# Patient Record
Sex: FEMALE | Race: BLACK OR AFRICAN AMERICAN | NOT HISPANIC OR LATINO | Employment: FULL TIME | URBAN - METROPOLITAN AREA
[De-identification: names, ages, dates, MRNs, and addresses within clinical notes are randomized per-mention and may not be internally consistent; named-entity substitution may affect disease eponyms.]

---

## 2017-02-16 ENCOUNTER — ALLSCRIPTS OFFICE VISIT (OUTPATIENT)
Dept: OTHER | Facility: OTHER | Age: 42
End: 2017-02-16

## 2017-02-16 DIAGNOSIS — M54.50 LOW BACK PAIN: ICD-10-CM

## 2017-03-09 ENCOUNTER — APPOINTMENT (OUTPATIENT)
Dept: LAB | Facility: CLINIC | Age: 42
End: 2017-03-09
Payer: COMMERCIAL

## 2017-03-09 DIAGNOSIS — M54.50 LOW BACK PAIN: ICD-10-CM

## 2017-03-09 LAB
ALBUMIN SERPL BCP-MCNC: 3.5 G/DL (ref 3.5–5)
ALP SERPL-CCNC: 56 U/L (ref 46–116)
ALT SERPL W P-5'-P-CCNC: 24 U/L (ref 12–78)
ANION GAP SERPL CALCULATED.3IONS-SCNC: 8 MMOL/L (ref 4–13)
AST SERPL W P-5'-P-CCNC: 12 U/L (ref 5–45)
BASOPHILS # BLD AUTO: 0.01 THOUSANDS/ΜL (ref 0–0.1)
BASOPHILS NFR BLD AUTO: 0 % (ref 0–1)
BILIRUB SERPL-MCNC: 0.52 MG/DL (ref 0.2–1)
BILIRUB UR QL STRIP: NEGATIVE
BUN SERPL-MCNC: 8 MG/DL (ref 5–25)
CALCIUM SERPL-MCNC: 8.5 MG/DL (ref 8.3–10.1)
CHLORIDE SERPL-SCNC: 106 MMOL/L (ref 100–108)
CLARITY UR: CLEAR
CO2 SERPL-SCNC: 28 MMOL/L (ref 21–32)
COLOR UR: YELLOW
CREAT SERPL-MCNC: 0.73 MG/DL (ref 0.6–1.3)
EOSINOPHIL # BLD AUTO: 0.06 THOUSAND/ΜL (ref 0–0.61)
EOSINOPHIL NFR BLD AUTO: 2 % (ref 0–6)
ERYTHROCYTE [DISTWIDTH] IN BLOOD BY AUTOMATED COUNT: 13.3 % (ref 11.6–15.1)
GFR SERPL CREATININE-BSD FRML MDRD: >60 ML/MIN/1.73SQ M
GLUCOSE SERPL-MCNC: 85 MG/DL (ref 65–140)
GLUCOSE UR STRIP-MCNC: NEGATIVE MG/DL
HCT VFR BLD AUTO: 42.4 % (ref 34.8–46.1)
HGB BLD-MCNC: 14.1 G/DL (ref 11.5–15.4)
HGB UR QL STRIP.AUTO: NEGATIVE
KETONES UR STRIP-MCNC: NEGATIVE MG/DL
LEUKOCYTE ESTERASE UR QL STRIP: NEGATIVE
LYMPHOCYTES # BLD AUTO: 2.47 THOUSANDS/ΜL (ref 0.6–4.47)
LYMPHOCYTES NFR BLD AUTO: 61 % (ref 14–44)
MAGNESIUM SERPL-MCNC: 2.1 MG/DL (ref 1.6–2.6)
MCH RBC QN AUTO: 27.8 PG (ref 26.8–34.3)
MCHC RBC AUTO-ENTMCNC: 33.3 G/DL (ref 31.4–37.4)
MCV RBC AUTO: 84 FL (ref 82–98)
MONOCYTES # BLD AUTO: 0.29 THOUSAND/ΜL (ref 0.17–1.22)
MONOCYTES NFR BLD AUTO: 7 % (ref 4–12)
NEUTROPHILS # BLD AUTO: 1.21 THOUSANDS/ΜL (ref 1.85–7.62)
NEUTS SEG NFR BLD AUTO: 30 % (ref 43–75)
NITRITE UR QL STRIP: NEGATIVE
NRBC BLD AUTO-RTO: 0 /100 WBCS
PH UR STRIP.AUTO: 6.5 [PH] (ref 4.5–8)
PLATELET # BLD AUTO: 196 THOUSANDS/UL (ref 149–390)
PMV BLD AUTO: 11.3 FL (ref 8.9–12.7)
POTASSIUM SERPL-SCNC: 3.3 MMOL/L (ref 3.5–5.3)
PROT SERPL-MCNC: 7.3 G/DL (ref 6.4–8.2)
PROT UR STRIP-MCNC: NEGATIVE MG/DL
RBC # BLD AUTO: 5.07 MILLION/UL (ref 3.81–5.12)
SODIUM SERPL-SCNC: 142 MMOL/L (ref 136–145)
SP GR UR STRIP.AUTO: 1.02 (ref 1–1.03)
UROBILINOGEN UR QL STRIP.AUTO: 0.2 E.U./DL
WBC # BLD AUTO: 4.05 THOUSAND/UL (ref 4.31–10.16)

## 2017-03-09 PROCEDURE — 80053 COMPREHEN METABOLIC PANEL: CPT

## 2017-03-09 PROCEDURE — 87491 CHLMYD TRACH DNA AMP PROBE: CPT

## 2017-03-09 PROCEDURE — 36415 COLL VENOUS BLD VENIPUNCTURE: CPT

## 2017-03-09 PROCEDURE — 81003 URINALYSIS AUTO W/O SCOPE: CPT

## 2017-03-09 PROCEDURE — 83735 ASSAY OF MAGNESIUM: CPT

## 2017-03-09 PROCEDURE — 85025 COMPLETE CBC W/AUTO DIFF WBC: CPT

## 2017-03-09 PROCEDURE — 87591 N.GONORRHOEAE DNA AMP PROB: CPT

## 2017-03-10 ENCOUNTER — GENERIC CONVERSION - ENCOUNTER (OUTPATIENT)
Dept: OTHER | Facility: OTHER | Age: 42
End: 2017-03-10

## 2017-03-10 LAB
CHLAMYDIA DNA CVX QL NAA+PROBE: NORMAL
N GONORRHOEA DNA GENITAL QL NAA+PROBE: NORMAL

## 2017-03-13 ENCOUNTER — GENERIC CONVERSION - ENCOUNTER (OUTPATIENT)
Dept: OTHER | Facility: OTHER | Age: 42
End: 2017-03-13

## 2017-08-18 ENCOUNTER — ALLSCRIPTS OFFICE VISIT (OUTPATIENT)
Dept: OTHER | Facility: OTHER | Age: 42
End: 2017-08-18

## 2017-08-18 DIAGNOSIS — R25.2 CRAMP AND SPASM: ICD-10-CM

## 2017-08-18 DIAGNOSIS — E87.6 HYPOKALEMIA: ICD-10-CM

## 2017-08-18 DIAGNOSIS — R10.9 ABDOMINAL PAIN: ICD-10-CM

## 2017-08-18 DIAGNOSIS — K21.9 GASTRO-ESOPHAGEAL REFLUX DISEASE WITHOUT ESOPHAGITIS: ICD-10-CM

## 2017-08-18 DIAGNOSIS — D72.819 DECREASED WHITE BLOOD CELL COUNT: ICD-10-CM

## 2017-08-23 ENCOUNTER — APPOINTMENT (OUTPATIENT)
Dept: LAB | Facility: CLINIC | Age: 42
End: 2017-08-23
Payer: COMMERCIAL

## 2017-08-23 DIAGNOSIS — E87.6 HYPOKALEMIA: ICD-10-CM

## 2017-08-23 DIAGNOSIS — R25.2 CRAMP AND SPASM: ICD-10-CM

## 2017-08-23 DIAGNOSIS — R10.9 ABDOMINAL PAIN: ICD-10-CM

## 2017-08-23 DIAGNOSIS — D72.819 DECREASED WHITE BLOOD CELL COUNT: ICD-10-CM

## 2017-08-23 DIAGNOSIS — K21.9 GASTRO-ESOPHAGEAL REFLUX DISEASE WITHOUT ESOPHAGITIS: ICD-10-CM

## 2017-08-23 LAB
ALBUMIN SERPL BCP-MCNC: 3.5 G/DL (ref 3.5–5)
ALP SERPL-CCNC: 54 U/L (ref 46–116)
ALT SERPL W P-5'-P-CCNC: 19 U/L (ref 12–78)
ANION GAP SERPL CALCULATED.3IONS-SCNC: 7 MMOL/L (ref 4–13)
AST SERPL W P-5'-P-CCNC: 13 U/L (ref 5–45)
BASOPHILS # BLD AUTO: 0.01 THOUSANDS/ΜL (ref 0–0.1)
BASOPHILS NFR BLD AUTO: 0 % (ref 0–1)
BILIRUB SERPL-MCNC: 0.81 MG/DL (ref 0.2–1)
BUN SERPL-MCNC: 11 MG/DL (ref 5–25)
CALCIUM SERPL-MCNC: 8.7 MG/DL (ref 8.3–10.1)
CHLORIDE SERPL-SCNC: 105 MMOL/L (ref 100–108)
CO2 SERPL-SCNC: 27 MMOL/L (ref 21–32)
CREAT SERPL-MCNC: 0.72 MG/DL (ref 0.6–1.3)
EOSINOPHIL # BLD AUTO: 0.07 THOUSAND/ΜL (ref 0–0.61)
EOSINOPHIL NFR BLD AUTO: 2 % (ref 0–6)
ERYTHROCYTE [DISTWIDTH] IN BLOOD BY AUTOMATED COUNT: 13.1 % (ref 11.6–15.1)
GFR SERPL CREATININE-BSD FRML MDRD: 120 ML/MIN/1.73SQ M
GLUCOSE P FAST SERPL-MCNC: 75 MG/DL (ref 65–99)
HCT VFR BLD AUTO: 41.8 % (ref 34.8–46.1)
HGB BLD-MCNC: 14.1 G/DL (ref 11.5–15.4)
LYMPHOCYTES # BLD AUTO: 2.06 THOUSANDS/ΜL (ref 0.6–4.47)
LYMPHOCYTES NFR BLD AUTO: 60 % (ref 14–44)
MAGNESIUM SERPL-MCNC: 2.2 MG/DL (ref 1.6–2.6)
MCH RBC QN AUTO: 27.7 PG (ref 26.8–34.3)
MCHC RBC AUTO-ENTMCNC: 33.7 G/DL (ref 31.4–37.4)
MCV RBC AUTO: 82 FL (ref 82–98)
MONOCYTES # BLD AUTO: 0.26 THOUSAND/ΜL (ref 0.17–1.22)
MONOCYTES NFR BLD AUTO: 8 % (ref 4–12)
NEUTROPHILS # BLD AUTO: 1.03 THOUSANDS/ΜL (ref 1.85–7.62)
NEUTS SEG NFR BLD AUTO: 30 % (ref 43–75)
NRBC BLD AUTO-RTO: 0 /100 WBCS
PLATELET # BLD AUTO: 179 THOUSANDS/UL (ref 149–390)
PMV BLD AUTO: 11.3 FL (ref 8.9–12.7)
POTASSIUM SERPL-SCNC: 3.4 MMOL/L (ref 3.5–5.3)
PROT SERPL-MCNC: 7.2 G/DL (ref 6.4–8.2)
RBC # BLD AUTO: 5.09 MILLION/UL (ref 3.81–5.12)
SODIUM SERPL-SCNC: 139 MMOL/L (ref 136–145)
WBC # BLD AUTO: 3.44 THOUSAND/UL (ref 4.31–10.16)

## 2017-08-23 PROCEDURE — 83735 ASSAY OF MAGNESIUM: CPT

## 2017-08-23 PROCEDURE — 80053 COMPREHEN METABOLIC PANEL: CPT

## 2017-08-23 PROCEDURE — 36415 COLL VENOUS BLD VENIPUNCTURE: CPT

## 2017-08-23 PROCEDURE — 84244 ASSAY OF RENIN: CPT

## 2017-08-23 PROCEDURE — 85025 COMPLETE CBC W/AUTO DIFF WBC: CPT

## 2017-08-23 PROCEDURE — 82088 ASSAY OF ALDOSTERONE: CPT

## 2017-08-26 LAB — RENIN PLAS-CCNC: 0.18 NG/ML/HR (ref 0.17–5.38)

## 2017-08-29 LAB — ALDOST SERPL-MCNC: 9.3 NG/DL (ref 0–30)

## 2017-09-28 ENCOUNTER — TRANSCRIBE ORDERS (OUTPATIENT)
Dept: ADMINISTRATIVE | Facility: HOSPITAL | Age: 42
End: 2017-09-28

## 2017-09-28 DIAGNOSIS — E87.6 HYPOPOTASSEMIA: ICD-10-CM

## 2017-09-28 DIAGNOSIS — R10.9 ABDOMINAL PAIN, UNSPECIFIED SITE: Primary | ICD-10-CM

## 2017-10-12 ENCOUNTER — HOSPITAL ENCOUNTER (OUTPATIENT)
Dept: RADIOLOGY | Age: 42
Discharge: HOME | End: 2017-10-12
Payer: COMMERCIAL

## 2017-10-12 DIAGNOSIS — E87.6 HYPOKALEMIA: ICD-10-CM

## 2017-10-12 DIAGNOSIS — R10.9 ABDOMINAL PAIN: ICD-10-CM

## 2017-10-12 PROCEDURE — 74170 CT ABD WO CNTRST FLWD CNTRST: CPT

## 2017-10-12 RX ADMIN — IOHEXOL 100 ML: 350 INJECTION, SOLUTION INTRAVENOUS at 09:12

## 2017-10-17 ENCOUNTER — ALLSCRIPTS OFFICE VISIT (OUTPATIENT)
Dept: OTHER | Facility: OTHER | Age: 42
End: 2017-10-17

## 2017-10-17 DIAGNOSIS — E87.6 HYPOKALEMIA: ICD-10-CM

## 2017-10-17 DIAGNOSIS — Z12.31 ENCOUNTER FOR SCREENING MAMMOGRAM FOR MALIGNANT NEOPLASM OF BREAST: ICD-10-CM

## 2017-10-21 NOTE — PROGRESS NOTES
Assessment  1  Encounter for screening mammogram for breast cancer () (Z12 31)   2  Encounter for routine gynecological examination with Papanicolaou smear of cervix   (,V7 2) (Z01 419)    Plan   * MAMMO SCREENING BILATERAL W CAD; Status:Hold For - Scheduling; Requested [id number];   Perform:[facility]; Due:2018; Ordered;    For:Encounter for screening mammogram for breast cancer; Ordered By:May Morrow; Discussion/Summary  healthy adult female Currently, she eats an adequate diet and has an inadequate exercise regimen  cervical cancer screening is current next cervical cancer screening is due 2021 Breast cancer screening: the risks and benefits of breast cancer screening were discussed, self breast exam technique was taught, monthly self breast exam was advised and mammogram has been ordered  Colorectal cancer screening: colorectal cancer screening is not indicated  Advice and education were given regarding nutrition, aerobic exercise, calcium supplements, vitamin D supplements and seat belt use      41y/o  here for annual exam  Annual exam   - Last pap 2016 resulted NILM and HR HPV negative  Next pap due 2021  - Mammogram ordered today  Hot flashes/ Anxiety   - Reviewed layering clothing, avoiding spicy foods,   - Recommend starting an exercise regimen   - Follow up with PCP if anxiety increasesMaintenance   - Declines Flu vaccine today   - Encourage daily multi-vitamin and calcium supplements1 year or sooner as needed   The patient has the current Goals: Annual exam  The patent has the current Barriers: None  Patient is able to Self-Care  History of Present Illness  HPI: 41 y/o  here for annual exam  Last pap 2016 resulting NILM and HR HPV negative  Next pap due   Last mammogram 10/2015 necessitating a right breast US and subsequent biopsy resulting - consistent with cellular fibroadenoma  LMP 17 occurring monthly and lasting 3 days   Pt c/o hot flashes 2-3 the week prior to menses  Also having episodes 1-2/ week of feeling scared, these episodes increase prior to menses  GYN HM, Adult Female [facility]: The patient is being seen for a gynecology evaluation  The last health maintenance visit was 1 year(s) ago  Social History: Household members include 1 daughter(s)-- and-- 2 son(s)  She is unmarried  Work status: working full time  The patient has never smoked cigarettes  She reports rare alcohol use  She has never used illicit drugs  General Health: The patient's health since the last visit is described as good  She has regular dental visits  -- She denies vision problems  Vision care includes no need for vision correction-- and-- no recent eye examination  -- She denies hearing loss  Immunizations status: up to date  Lifestyle:  She consumes a diverse and healthy diet  -- She does not have any weight concerns  -- She exercises regularly  She exercises 2 times per week  Exercise includes walking -- She does not use tobacco -- She consumes alcohol  She reports occasional alcohol use  -- She denies drug use  Reproductive health: the patient is perimenopausal--   she reports menstrual problems  Menstrual history:  age at menarche was 9  LMP: the last menstrual period was 9/23/17  Recent menstrual periods: bleeding has been normal  The cycles have been regular-- and-- occur approximately every monthly days  The duration of her recent periods has been regular-- and-- usually last 3 days  -- she uses contraception  For contraception, she has had a tubal occlusion  -- she is sexually active  She is monogamous with a male partner  -- pregnancy history: G 6P 4,-- 4(miscarriages: 2 )  Screening: cancer screening reviewed and updated  metabolic screening reviewed and current  risk screening reviewed and current  Review of Systems  no pelvic pain,-- no pelvic pressure,-- no vaginal pain,-- no vaginal discharge-- and-- no dysuria  Constitutional: No fever, no chills, feels well, no tiredness, no recent weight gain or loss  ENT: no ear ache, no loss of hearing, no nosebleeds or nasal discharge, no sore throat or hoarseness  Cardiovascular: no complaints of slow or fast heart rate, no chest pain, no palpitations, no leg claudication or lower extremity edema  Respiratory: no complaints of shortness of breath, no wheezing, no dyspnea on exertion, no orthopnea or PND  Breasts: no complaints of breast pain, breast lump or nipple discharge  Gastrointestinal: no complaints of abdominal pain, no constipation, no nausea or diarrhea, no vomiting, no bloody stools  Genitourinary: no complaints of dysuria, no incontinence, no pelvic pain, no dysmenorrhea, no vaginal discharge or abnormal vaginal bleeding  Musculoskeletal: no complaints of arthralgia, no myalgia, no joint swelling or stiffness, no limb pain or swelling  Integumentary: no complaints of skin rash or lesion, no itching or dry skin, no skin wounds  Neurological: no complaints of headache, no confusion, no numbness or tingling, no dizziness or fainting  Over the past 2 weeks, how often have you been bothered by the following problems? 1 ) Little interest or pleasure in doing things? Not at all    2 ) Feeling down, depressed or hopeless? Not at all    3 ) Trouble falling asleep or sleeping too much? Not at all    4 ) Feeling tired or having little energy? Not at all    5 ) Poor appetite or overeating? Several days  6 ) Feeling bad about yourself, or that you are a failure, or have let yourself or your family down? Not at all    7 ) Trouble concentrating on things, such as reading a newspaper or watching television? Not at all    8 ) Moving or speaking so slowly that other people could have noticed, or the opposite, moving or speaking faster than usual? Not at all     How difficult have these problems made it for you to do your work, take care of things at home, or get along with people? Somewhat difficult  Score 1     ROS reviewed  OB History  Pregnancy History (Brief):   Prior pregnancies: : 6  Para: 4 (full-term)-- and-- 4 (living)   Delivery type: 3 vaginal,-- 1  section   Additional pregnancy history details: 2 miscarriage(s)       Active Problems  1  Abdominal pain (789 00) (R10 9)   2  Abnormal echocardiogram (793 2) (R93 1)   3  GERD without esophagitis (530 81) (K21 9)   4  Hyperlipidemia (272 4) (E78 5)   5  Hypokalemia (276 8) (E87 6)   6  Leg cramps (729 82) (R25 2)   7  Leukopenia (288 50) (D72 819)   8  VSD (ventricular septal defect), perimembranous (745 4) (Q21 0)    Past Medical History   · History of Allergic conjunctivitis (372 14) (H10 10)   · History of Breast cancer screening (V76 10) (Z12 39)   · History of Chest wall contusion (922 1) (S20 219A)   · History of Cyst of right breast (610 0) (N60 01)   · History of Fibroadenoma of right breast (217) (D24 1)   · History of  4   · History of hypertension (V12 59) (Z86 79)   · History of Influenza B (487 1) (J10 1)   · History of On Depo-Provera for contraception (V25 49) (Z30 40)   · History of Oral contraceptive use (V25 41) (Z30 41)   · History of Syphilis (acquired) (097 9) (A53 9)   · History of Type B influenza (487 1) (J10 1)    The active problems and past medical history were reviewed and updated today  Surgical History   · History of Breast Surgery Lumpectomy   · History of  Section   · History of Tubal Ligation    The surgical history was reviewed and updated today  Family History  Father    · Family history of hypertension (V17 49) (Z82 49)    The family history was reviewed and updated today  Social History   · Never A Smoker   · No alcohol use   · No drug use  The social history was reviewed and updated today  Current Meds   1  No Reported Medications Recorded    Allergies  1   No Known Drug Allergies    Vitals   Recorded: 98RJN7218 08: 22AM   Heart Rate 80   Systolic 429, LUE, Sitting   Diastolic 94, LUE, Sitting   Height 5 ft 0 25 in   Weight 137 lb    BMI Calculated 26 53   BSA Calculated 1 59   LMP [id number]   Pain Scale 0     Physical Exam    Constitutional   General appearance: No acute distress, well appearing and well nourished  Neck   Neck: Normal, supple, trachea midline, no masses  Thyroid: Normal, no thyromegaly  Pulmonary   Respiratory effort: No increased work of breathing or signs of respiratory distress  Auscultation of lungs: Clear to auscultation  Cardiovascular   Auscultation of heart: Normal rate and rhythm, normal S1 and S2, no murmurs  Peripheral vascular exam: Normal pulses Throughout  Genitourinary   External genitalia: Normal and no lesions appreciated  Vagina: Normal, no lesions or dryness appreciated  Urethral meatus: Normal     Bladder: Normal, soft, non-tender and no prolapse or masses appreciated  Cervix: Normal, no palpable masses  Examination of the cervix revealed No lesions  A Pap smear was not performed  Bimanual exam findings include no cervical motion tenderness  Uterus: Normal, non-tender, not enlarged, and no palpable masses  Adnexa/parametria: Normal, non-tender and no fullness or masses appreciated  Chest   Breasts: Normal and no dimpling or skin changes noted  Abdomen   Abdomen: Normal, non-tender, and no organomegaly noted  Lymphatic   Palpation of lymph nodes in neck, axillae, groin and/or other locations: No lymphadenopathy or masses noted  Skin   Skin and subcutaneous tissue: Normal skin turgor and no rashes  Psychiatric   Orientation to person, place, and time: Normal     Mood and affect: Normal        Results/Data  PHQ-9 Adult Depression Screening [id number] 08:29AM User, Ahs     Test Name Result Flag Reference   PHQ-9 Adult Depression Score 1     Over the last two weeks, how often have you been bothered by any of the following problems?   Little interest or pleasure in doing things: Not at all - 0  Feeling down, depressed, or hopeless: Not at all - 0  Trouble falling or staying asleep, or sleeping too much: Not at all - 0  Feeling tired or having little energy: Not at all - 0  Poor appetite or over eating: Several days - 1  Feeling bad about yourself - or that you are a failure or have let yourself or your family down: Not at all - 0  Trouble concentrating on things, such as reading the newspaper or watching television: Not at all - 0  Moving or speaking so slowly that other people could have noticed  Or the opposite -  being so fidgety or restless that you have been moving around a lot more than usual: Not at all - 0  Thoughts that you would be better off dead, or of hurting yourself in some way: Not at all - 0   PHQ-9 Adult Depression Screening Negative     PHQ-9 Difficulty Level Somewhat difficult     PHQ-9 Severity Minimal Depression         Attending Note  Collaborating Physician Note: Collaborating Note: I agree with the Advanced Practitioner note   I discussed the case with the Advanced Practitioner and reviewed the AP note      Future Appointments    Date/Time Provider Specialty Site   11/30/2017 03:30 PM Brunilda Garcia, DO Internal Medicine [address]     Signatures   Electronically signed by : Ganga Braxton [facility]; Oct 17 2017  9:10AM EST                       (Author)    Electronically signed by : RIK Mei ; Oct 20 2017 11:03PM EST                       (Author)

## 2017-10-26 ENCOUNTER — HOSPITAL ENCOUNTER (OUTPATIENT)
Dept: RADIOLOGY | Age: 42
Discharge: HOME | End: 2017-10-26
Payer: COMMERCIAL

## 2017-10-26 DIAGNOSIS — Z12.31 ENCOUNTER FOR SCREENING MAMMOGRAM FOR MALIGNANT NEOPLASM OF BREAST: ICD-10-CM

## 2017-10-26 PROCEDURE — G0202 SCR MAMMO BI INCL CAD: HCPCS

## 2017-11-21 ENCOUNTER — APPOINTMENT (OUTPATIENT)
Dept: LAB | Facility: CLINIC | Age: 42
End: 2017-11-21
Payer: COMMERCIAL

## 2017-11-21 ENCOUNTER — GENERIC CONVERSION - ENCOUNTER (OUTPATIENT)
Dept: OTHER | Facility: OTHER | Age: 42
End: 2017-11-21

## 2017-11-21 DIAGNOSIS — E87.6 HYPOKALEMIA: ICD-10-CM

## 2017-11-21 LAB
ANION GAP SERPL CALCULATED.3IONS-SCNC: 6 MMOL/L (ref 4–13)
BUN SERPL-MCNC: 9 MG/DL (ref 5–25)
CALCIUM SERPL-MCNC: 8.5 MG/DL (ref 8.3–10.1)
CHLORIDE SERPL-SCNC: 108 MMOL/L (ref 100–108)
CO2 SERPL-SCNC: 27 MMOL/L (ref 21–32)
CREAT SERPL-MCNC: 0.72 MG/DL (ref 0.6–1.3)
GFR SERPL CREATININE-BSD FRML MDRD: 120 ML/MIN/1.73SQ M
GLUCOSE P FAST SERPL-MCNC: 83 MG/DL (ref 65–99)
POTASSIUM SERPL-SCNC: 3.7 MMOL/L (ref 3.5–5.3)
SODIUM SERPL-SCNC: 141 MMOL/L (ref 136–145)

## 2017-11-21 PROCEDURE — 36415 COLL VENOUS BLD VENIPUNCTURE: CPT

## 2017-11-21 PROCEDURE — 80048 BASIC METABOLIC PNL TOTAL CA: CPT

## 2017-11-30 ENCOUNTER — ALLSCRIPTS OFFICE VISIT (OUTPATIENT)
Dept: OTHER | Facility: OTHER | Age: 42
End: 2017-11-30

## 2017-11-30 DIAGNOSIS — Z86.19 PERSONAL HISTORY OF OTHER INFECTIOUS AND PARASITIC DISEASES: ICD-10-CM

## 2017-11-30 DIAGNOSIS — E78.5 HYPERLIPIDEMIA: ICD-10-CM

## 2017-12-04 ENCOUNTER — GENERIC CONVERSION - ENCOUNTER (OUTPATIENT)
Dept: OTHER | Facility: OTHER | Age: 42
End: 2017-12-04

## 2017-12-04 ENCOUNTER — APPOINTMENT (OUTPATIENT)
Dept: LAB | Facility: CLINIC | Age: 42
End: 2017-12-04
Payer: COMMERCIAL

## 2017-12-04 DIAGNOSIS — E78.5 HYPERLIPIDEMIA: ICD-10-CM

## 2017-12-04 DIAGNOSIS — Z86.19 PERSONAL HISTORY OF OTHER INFECTIOUS AND PARASITIC DISEASES: ICD-10-CM

## 2017-12-04 LAB
CHOLEST SERPL-MCNC: 190 MG/DL (ref 50–200)
HDLC SERPL-MCNC: 57 MG/DL (ref 40–60)
LDLC SERPL CALC-MCNC: 119 MG/DL (ref 0–100)
TRIGL SERPL-MCNC: 68 MG/DL

## 2017-12-04 PROCEDURE — 80061 LIPID PANEL: CPT

## 2017-12-04 PROCEDURE — 86803 HEPATITIS C AB TEST: CPT

## 2017-12-04 PROCEDURE — 36415 COLL VENOUS BLD VENIPUNCTURE: CPT

## 2017-12-04 PROCEDURE — 86592 SYPHILIS TEST NON-TREP QUAL: CPT

## 2017-12-05 LAB
HCV AB SER QL: NORMAL
RPR SER QL: NORMAL

## 2017-12-06 ENCOUNTER — GENERIC CONVERSION - ENCOUNTER (OUTPATIENT)
Dept: OTHER | Facility: OTHER | Age: 42
End: 2017-12-06

## 2017-12-07 ENCOUNTER — ALLSCRIPTS OFFICE VISIT (OUTPATIENT)
Dept: OTHER | Facility: OTHER | Age: 42
End: 2017-12-07

## 2017-12-08 NOTE — PROGRESS NOTES
Assessment  Assessed    1  Dizziness (780 4) (R42)    Plan  Dizziness    · EKG/ECG- POC; Status:Complete;   Done: 19UEZ2856   Perform: In Office; [phone]; Last Updated By:Piotr Zheng; 12/7/2017 9:44:44 AM;Ordered;Ordered By:Catherine Wells;   · Follow-up PRN Evaluation and Treatment  Follow-up  Status: Complete  Done:65Wvk1845   Ordered;Dizziness; Ordered By: John Blanchard Performed:  Due: 34OUZ5669    Discussion/Summary  Cardiology Discussion Summary Free Text Note Form [facility]:   81-year-old female with history of an abnormal echo suggestive of a VSD or aortic root aneurysm, but cardiac MRI without any significant abnormality  Episodic elevated blood pressures  The symptoms she describes is likely related to headache causing elevated blood pressure  Recommended conservative treatment with analgesics  Avoid NSAIDs  Can use Tylenol  Does not have need for long-term antihypertensive at this time  When she was on this previously, she was likely hypotensive and felt unwell with it     can return on an as-needed basis  Chief Complaint  Chief Complaint Free Text Note Form: Patient is here for a follow up  History of Present Illness  Cardiology HPI Free Text Note Form [facility]: Pleasant 81-year-old female who I have seen previously for an abnormal echocardiogram, which questioned a VSD  ordered a cardiac MRI in follow-up  There was no intraventricular shunt  No sinus of Valsalva aneurysm  Root was normal size  Normal study  Her blood pressures previously as well had been on the higher side  We had started her on a low dose of amlodipine, but she felt dizzy and lightheaded  More recently, blood pressures overall have been controlled  She presents to the office today in follow-up of an episode of feeling dizzy and lightheaded as well as having headache  She says she was at work ( she works as a  ) she felt headache and on well   She went to the nursing station and her blood pressure was taken and was elevated  Mostly diastolic blood pressure was high, she says it was around 100  she had a leftover tablet of the amlodipine, and she took that, and 45 minutes later she said she felt better  The following day she had a mild residual headache  Took Tylenol, and it was fine  Has not recurred since  Otherwise, she has not had any other symptoms in the interim  She denies any chest pain or shortness of breath  Review of Systems  Cardiology Female ROS:    Cardiac: No complaints of chest pain, no palpitations, no fainting ,-- no syncope/fainting,-- no AM fatigue-- and-- no witnessed apnea episodes  Skin: No complaints of nonhealing sores or skin rash  Genitourinary: No complaints of recurrent urinary tract infections, frequent urination at night, difficult urination, blood in urine, kidney stones, loss of bladder control, kidney problems, denies any birth control or hormone replacement, is not post menopausal, not currently pregnant  Psychological: No complaints of feeling depressed, anxiety, panic attacks, or difficulty concentrating  General: No complaints of trouble sleeping, lack of energy, fatigue, appetite changes, weight changes, fever, frequent infections, or night sweats  Respiratory: No complaints of shortness of breath, cough with sputum, or wheezing  HEENT: No complaints of serious problems, hearing problems, nose problems, throat problems, or snoring  Gastrointestinal: heartburn, but-- no liver problems,-- no nausea,-- no vomiting,-- no bloody stools,-- no diarrhea,-- no constipation,-- no abdonimal pain-- and-- no rectal bleeding  Hematologic: No complaints of bleeding disorders, anemia, blood clots, or excessive brusing  Neurological: headaches, but-- no numbness,-- no tingling,-- no weakness,-- no seizures,-- no dizziness,-- no diplopia-- and-- no daytime sleepiness  Musculoskeletal: No complaints of arthritis, back pain, or painfull swelling  ROS Reviewed:   ROS reviewed  Active Problems  Problems    1  Abnormal echocardiogram (793 2) (R93 1)   2  Dizziness (780 4) (R42)   3  GERD without esophagitis (530 81) (K21 9)   4  History of syphilis (V12 09) (Z86 19)   5  Hyperlipidemia (272 4) (E78 5)   6  Hypokalemia (276 8) (E87 6)   7  Leukopenia (288 50) (D72 819)   8  Need for hepatitis C screening test (V73 89) (Z11 59)   9  Need for prophylactic vaccination and inoculation against influenza (V04 81) (Z23)   10  Screening for cardiovascular condition (V81 2) (Z13 6)    Past Medical History  Problems    1  History of Allergic conjunctivitis (372 14) (H10 10)   2  History of Breast cancer screening (V76 10) (Z12 39)   3  History of Chest wall contusion (922 1) (S20 219A)   4  History of Cyst of right breast (610 0) (N60 01)   5  History of Fibroadenoma of right breast (217) (D24 1)   6  History of  4   7  History of hypertension (V12 59) (Z86 79)   8  History of Influenza B (487 1) (J10 1)   9  History of On Depo-Provera for contraception (V25 49) (Z30 40)   10  History of Oral contraceptive use (V25 41) (Z30 41)   11  History of Syphilis (acquired) (097 9) (A53 9)   12  History of Type B influenza (487 1) (J10 1)  Active Problems And Past Medical History Reviewed: The active problems and past medical history were reviewed and updated today  Surgical History  Problems    1  History of Breast Surgery Lumpectomy   2  History of  Section   3  History of Tubal Ligation  Surgical History Reviewed: The surgical history was reviewed and updated today  Family History  Father    1  Family history of hypertension (V17 49) (Z82 49)  Family History Reviewed: The family history was reviewed and updated today  Social History  Problems    · Never smoked cigarettes (V49 89) (Z78 9)   · No alcohol use   · No drug use  Social History Reviewed: The social history was reviewed and is unchanged  Current Meds   1   No Reported Medications Recorded    Allergies  Medication    1  No Known Drug Allergies    Vitals  Vital Signs    Recorded: 17Bpc2798 09:45AM   Heart Rate 73   Systolic 861, RUE, Sitting   Diastolic 78, RUE, Sitting   Height 5 ft 2 in   Weight 132 lb    BMI Calculated 24 14   BSA Calculated 1 6       Physical Exam   Constitutional - General appearance: No acute distress, well appearing and well nourished  Eyes - Conjunctiva and Sclera examination: Conjunctiva pink, sclera anicteric  Neck - Normal, no JVD   Pulmonary - Respiratory effort: No signs of respiratory distress  -- Auscultation of lungs: Clear to auscultation  Cardiovascular - Auscultation of heart: Normal rate and rhythm, normal S1 and S2, no murmurs  -- Pedal pulses: Normal, 2+ bilaterally  -- Examination of extremities for edema and/or varicosities: Normal    Abdomen - Soft  Musculoskeletal - Gait and station: Normal gait  Skin - Skin: Normal without rashes  Skin is warm and well perfused  Neurologic - Speech normal  No focal deficits  Psychiatric - Orientation to person, place, and time: Normal -- Mood and affect: Normal       Results/Data  ECG Report: Sinus rhythm  71 beats per minute  Nonspecific T-wave abnormality  Health Management  Health Maintenance   (every) THINPREP PAP; every 1 year; Next Due: 03NJA0708; Overdue  BREAST EXAM; every 1 year; Next Due: 55MCI6722; Overdue  PELVIC EXAM; every 1 year; Next Due: 90JTF7018;  Overdue    Signatures   Electronically signed by : RIK Cheung ; Dec  7 2017 10:35AM EST                       (Author)

## 2018-01-12 NOTE — RESULT NOTES
Verified Results  (1) CHLAMYDIA/GC AMPLIFIED DNA, PCR 01ZPC0708 07:05AM Nicho Velazco    Order Number: [id number]     Test Name Result Flag Reference   CHLAMYDIA,AMPLIFIED DNA PROBE   C  trachomatis Amplified DNA Negative   C  trachomatis Amplified DNA Negative   For optimal microbe detection, urine samples should be a first catch specimen (20-60 ml of urine)  Patient should not have urinated for at least 1 hour prior to collection  A specimen not collected in this manner may have falsely negative results     N  GONORRHOEAE AMPLIFIED DNA   N  gonorrhoeae Amplified DNA Negative   N  gonorrhoeae Amplified DNA Negative

## 2018-01-12 NOTE — PROGRESS NOTES
Assessment    1  Never smoked cigarettes (V49 89) (Z78 9)   2  Encounter for preventive health examination (V70 0) (Z00 00)   3  Need for hepatitis C screening test (V73 89) (Z11 59)   4  History of syphilis (V12 09) (Z86 19)   5  Screening for cardiovascular condition (V81 2) (Z13 6)    Plan  History of syphilis    · (1) RPR; Status:Active; Requested for:30Nov2017;   Hyperlipidemia, Screening for cardiovascular condition    · (1) LIPID PANEL, FASTING; Status:Active; Requested for:30Nov2017;   Need for hepatitis C screening test    · (Q) HEPATITIS C ANTIBODY; Status:Active; Requested for:30Nov2017;   Need for prophylactic vaccination and inoculation against influenza    · Stop: Fluzone Quadrivalent Intramuscular Suspension    Discussion/Summary  health maintenance visit Currently, she eats a healthy diet  cervical cancer screening is current Breast cancer screening: mammogram is current  Colorectal cancer screening: colorectal cancer screening is not indicated  The immunizations are up to date  CHECK HEP C SCREEN  CHECK RPR- HX OF SYPHILLIS  CHECK LIPIDS  FOLLOW UP YEARY  PAP AND MAMMO UP TO DATE  The patient was counseled regarding diagnostic results, instructions for management, risk factor reductions, prognosis, patient and family education, impressions, risks and benefits of treatment options, importance of compliance with treatment  Chief Complaint  PATIENT HERE FOR ANNUAL EXAM  SHE IS REQUESTING HEP C TESTING      History of Present Illness  , Adult Female: The patient is being seen for a health maintenance evaluation  The last health maintenance visit was 12 month(s) ago  General Health: The patient's health since the last visit is described as good  She has regular dental visits  She denies vision problems  She denies hearing loss  Immunizations status: up to date  Lifestyle:  She consumes a diverse and healthy diet  She does not have any weight concerns  She exercises regularly   She does not use tobacco  She denies alcohol use  She denies drug use  Reproductive health: the patient is premenopausal    Screening: cancer screening reviewed and current  metabolic screening reviewed and current  risk screening reviewed and current  HPI: PATIENT HERE FOR ANNUAL EXAM;      Review of Systems    Constitutional: No fever, no chills, feels well, no tiredness, no recent weight gain or weight loss, no fever, not feeling poorly, no chills and not feeling tired  Eyes: No complaints of eye pain, no red eyes, no eyesight problems, no discharge, no dry eyes, no itching of eyes, no eye pain, no eyesight problems, eyes not red and no purulent discharge from the eyes  ENT: no complaints of earache, no loss of hearing, no nose bleeds, no nasal discharge, no sore throat, no hoarseness, no earache, no nosebleeds, no sore throat, no hearing loss, no nasal discharge and no hoarseness  Cardiovascular: No complaints of slow heart rate, no fast heart rate, no chest pain, no palpitations, no leg claudication, no lower extremity edema, the heart rate was not slow and the heart rate was not fast    Respiratory: No complaints of shortness of breath, no wheezing, no cough, no SOB on exertion, no orthopnea, no PND, no shortness of breath and no wheezing  Gastrointestinal: No complaints of abdominal pain, no constipation, no nausea or vomiting, no diarrhea, no bloody stools, no nausea, no constipation and no diarrhea  Genitourinary: No complaints of dysuria, no incontinence, no pelvic pain, no dysmenorrhea, no vaginal discharge or bleeding  Musculoskeletal: No complaints of arthralgias, no myalgias, no joint swelling or stiffness, no limb pain or swelling  Integumentary: No complaints of skin rash or lesions, no itching, no skin wounds, no breast pain or lump, no rashes and no skin lesions     Neurological: No complaints of headache, no confusion, no convulsions, no numbness, no dizziness or fainting, no tingling, no limb weakness, no difficulty walking, no headache and no confusion  Psychiatric: Not suicidal, no sleep disturbance, no anxiety or depression, no change in personality, no emotional problems, no anxiety, no sleep disturbances and no depression  Endocrine: No complaints of proptosis, no hot flashes, no muscle weakness, no deepening of the voice, no feelings of weakness  Hematologic/Lymphatic: No complaints of swollen glands, no swollen glands in the neck, does not bleed easily, does not bruise easily  ROS reviewed  Active Problems    1  Abnormal echocardiogram (793 2) (R93 1)   2  GERD without esophagitis (530 81) (K21 9)   3  Hyperlipidemia (272 4) (E78 5)   4  Hypokalemia (276 8) (E87 6)   5  Leukopenia (288 50) (D72 819)   6  Need for prophylactic vaccination and inoculation against influenza (V04 81) (Z23)   7  VSD (ventricular septal defect), perimembranous (745 4) (Q21 0)    Past Medical History    · History of Allergic conjunctivitis (372 14) (H10 10)   · History of Breast cancer screening (V76 10) (Z12 39)   · History of Chest wall contusion (922 1) (S20 219A)   · History of Cyst of right breast (610 0) (N60 01)   · History of Fibroadenoma of right breast (217) (D24 1)   · History of  4   · History of hypertension (V12 59) (Z86 79)   · History of Influenza B (487 1) (J10 1)   · History of On Depo-Provera for contraception (V25 49) (Z30 40)   · History of Oral contraceptive use (V25 41) (Z30 41)   · History of Syphilis (acquired) (097 9) (A53 9)   · History of Type B influenza (487 1) (J10 1)    Surgical History    · History of Breast Surgery Lumpectomy   · History of  Section   · History of Tubal Ligation    Family History  Father    · Family history of hypertension (V17 49) (Z82 49)    Social History    · Never smoked cigarettes (V49 89) (Z78 9)   · No alcohol use   · No drug use    Current Meds   1  No Reported Medications Recorded    Allergies    1   No Known Drug Allergies    Vitals   Recorded: 36TNH1902 03:51PM Recorded: 43YIO1322 03:36PM   Temperature  97 1 F, Tympanic   Heart Rate  78   Respiration  16   Systolic 006 566, LUE, Sitting   Diastolic 82 94, LUE, Sitting   Height  5 ft 0 25 in   Weight  136 lb 4 oz   BMI Calculated  26 39   BSA Calculated  1 59     Physical Exam    Constitutional   General appearance: No acute distress, well appearing and well nourished  Eyes   Conjunctiva and lids: No swelling, erythema or discharge  Pupils and irises: Equal, round, reactive to light  Ears, Nose, Mouth, and Throat   External inspection of ears and nose: Normal     Otoscopic examination: Tympanic membranes translucent with normal light reflex  Canals patent without erythema  Hearing: Normal     Nasal mucosa, septum, and turbinates: Normal without edema or erythema  Lips, teeth, and gums: Normal, good dentition  Oropharynx: Normal with no erythema, edema, exudate or lesions  Neck   Neck: Supple, symmetric, trachea midline, no masses  Thyroid: Normal, no thyromegaly  The thyroid was normal and was not enlarged  Pulmonary   Respiratory effort: No increased work of breathing or signs of respiratory distress  Auscultation of lungs: Clear to auscultation  Auscultation of the lungs revealed no expiratory wheezing, normal expiratory time and no inspiratory wheezing  no rales or crackles were heard bilaterally  no rhonchi  no friction rub  no wheezing  no diminished breath sounds  no bronchial breath sounds  Cardiovascular   Palpation of heart: Normal PMI, no thrills  Auscultation of heart: Normal rate and rhythm, normal S1 and S2, no murmurs  Carotid pulses: 2+ bilaterally  Pedal pulses: 2+ bilaterally  Abdomen   Abdomen: Non-tender, no masses  Liver and spleen: No hepatomegaly or splenomegaly  Lymphatic   Palpation of lymph nodes in neck: No lymphadenopathy      Musculoskeletal   Gait and station: Normal     Digits and nails: Normal without clubbing or cyanosis  Joints, bones, and muscles: Normal     Range of motion: Normal     Stability: Normal     Muscle strength/tone: Normal     Skin   Skin and subcutaneous tissue: Normal without rashes or lesions  Palpation of skin and subcutaneous tissue: Normal turgor  Psychiatric   Judgment and insight: Normal     Orientation to person, place, and time: Normal        Health Management  Health Maintenance   (every) THINPREP PAP; every 1 year; Next Due: 76MZF2553; Overdue  BREAST EXAM; every 1 year; Next Due: 25TYW0383; Overdue  PELVIC EXAM; every 1 year; Next Due: 96IMK3302; Overdue    Signatures   Electronically signed by :  Cynthia 89, DO; Nov 30 2017  4:04PM EST                       (Author)

## 2018-01-13 VITALS
TEMPERATURE: 98.3 F | DIASTOLIC BLOOD PRESSURE: 62 MMHG | BODY MASS INDEX: 27.62 KG/M2 | RESPIRATION RATE: 16 BRPM | HEART RATE: 80 BPM | HEIGHT: 59 IN | SYSTOLIC BLOOD PRESSURE: 110 MMHG | WEIGHT: 137 LBS

## 2018-01-13 VITALS
RESPIRATION RATE: 18 BRPM | DIASTOLIC BLOOD PRESSURE: 92 MMHG | HEIGHT: 59 IN | TEMPERATURE: 97.8 F | SYSTOLIC BLOOD PRESSURE: 122 MMHG | HEART RATE: 68 BPM | BODY MASS INDEX: 27.98 KG/M2 | WEIGHT: 138.8 LBS

## 2018-01-13 VITALS
RESPIRATION RATE: 16 BRPM | HEIGHT: 60 IN | WEIGHT: 136.25 LBS | BODY MASS INDEX: 26.75 KG/M2 | SYSTOLIC BLOOD PRESSURE: 124 MMHG | TEMPERATURE: 97.1 F | HEART RATE: 78 BPM | DIASTOLIC BLOOD PRESSURE: 82 MMHG

## 2018-01-13 VITALS
DIASTOLIC BLOOD PRESSURE: 94 MMHG | HEIGHT: 60 IN | HEART RATE: 80 BPM | SYSTOLIC BLOOD PRESSURE: 146 MMHG | BODY MASS INDEX: 26.9 KG/M2 | WEIGHT: 137 LBS

## 2018-01-13 NOTE — MISCELLANEOUS
Message  Return to work or school:   Holley Verduzco is under my professional care  She was seen in my office on 10/17/2017       She was seen in the office for an annual exam    Cristo Delacruz        Signatures   Electronically signed by : Margo Avila, ; Nov 28 2017  8:52AM EST                       (Author)

## 2018-01-14 NOTE — RESULT NOTES
Verified Results  (1) CBC/PLT/DIFF 28Apr2016 07:09AM Terry Hussein    Order Number: [id number]    TW Order Number: [id number]     Test Name Result Flag Reference   WBC COUNT 5 86 Thousand/uL  4 31-10 16   RBC COUNT 5 23 Million/uL H 3 81-5 12   HEMOGLOBIN 14 0 g/dL  11 5-15 4   HEMATOCRIT 42 0 %  34 8-46  1   MCV 80 fL L 82-98   MCH 26 8 pg  26 8-34 3   MCHC 33 3 g/dL  31 4-37 4   RDW 12 5 %  11 6-15 1   MPV 10 7 fL  8 9-12 7   PLATELET COUNT 754 Thousands/uL  149-390   nRBC AUTOMATED 0 /100 WBCs     NEUTROPHILS RELATIVE PERCENT 52 %  43-75   LYMPHOCYTES RELATIVE PERCENT 39 %  14-44   MONOCYTES RELATIVE PERCENT 6 %  4-12   EOSINOPHILS RELATIVE PERCENT 3 %  0-6   BASOPHILS RELATIVE PERCENT 0 %  0-1   NEUTROPHILS ABSOLUTE COUNT 3 04 Thousands/µL  1 85-7 62   LYMPHOCYTES ABSOLUTE COUNT 2 26 Thousands/µL  0 60-4 47   MONOCYTES ABSOLUTE COUNT 0 37 Thousand/µL  0 17-1 22   EOSINOPHILS ABSOLUTE COUNT 0 16 Thousand/µL  0 00-0 61   BASOPHILS ABSOLUTE COUNT 0 02 Thousands/µL  0 00-0 10     (1) COMPREHENSIVE METABOLIC PANEL [id number] 95:48GR Terry Hussein    Order Number: [id number]    TW Order Number: [id number] Order Number: [id number] Order Number: [id number]  National Kidney Disease Education Program recommendations are as follows:  GFR calculation is accurate only with a steady state creatinine  Chronic Kidney disease less than 60 ml/min/1 73 sq  meters  Kidney failure less than 15 ml/min/1 73 sq  meters  Test Name Result Flag Reference   GLUCOSE,RANDM 81 mg/dL     If the patient is fasting, the ADA then defines impaired fasting glucose as > 100 mg/dL and diabetes as > or equal to 123 mg/dL     SODIUM 139 mmol/L  136-145   POTASSIUM 3 8 mmol/L  3 5-5 3   CHLORIDE 104 mmol/L  100-108   CARBON DIOXIDE 29 mmol/L  21-32   ANION GAP (CALC) 6 mmol/L  4-13   BLOOD UREA NITROGEN 14 mg/dL  5-25   CREATININE 0 75 mg/dL  0 60-1 30   Standardized to IDMS reference method   CALCIUM 8 1 mg/dL L 8  3-10 1   BILI, TOTAL 0 58 mg/dL  0 20-1 00   ALK PHOSPHATAS 54 U/L     ALT (SGPT) 29 U/L  12-78   AST(SGOT) 11 U/L  5-45   ALBUMIN 3 5 g/dL  3 5-5 0   TOTAL PROTEIN 7 1 g/dL  6 4-8 2   eGFR Non-African American      >60 0 ml/min/1 73sq m     (1) MAGNESIUM 28Apr2016 07:09AM Bishop Galan    Order Number: [id number]     Order Number: [id number] Order Number: [id number] Order Number: [id number]     Test Name Result Flag Reference   MAGNESIUM 2 0 mg/dL  1 6-2 6     (1) CORTISOL AM SPECIMEN 28Apr2016 07:09AM Bishop Galan    Order Number: [id number]     Order Number: [id number]     Test Name Result Flag Reference   CORTISO AM SPEC 15 1 ug/mL  4 2-22 4     (1) SED RATE 28Apr2016 07:09AM [facility] Order Number: [id number]     Test Name Result Flag Reference   SED RATE 16 mm/hour  0-20     (1) URINALYSIS w URINE C/S REFLEX (will reflex a microscopy if leukocytes, occult blood, or nitrites are not within normal limits) 28Apr2016 07:09AM Bishop Galan    Order Number: [id number]     Order Number: [id number]     Test Name Result Flag Reference   COLOR Yellow     CLARITY Clear     PH UA 7 0  4 5-8 0   LEUKOCYTE ESTERASE UA Negative  Negative   NITRITE UA Negative  Negative   PROTEIN UA Negative mg/dl  Negative   GLUCOSE UA Negative mg/dl  Negative   KETONES UA Negative mg/dl  Negative   UROBILINOGEN UA 1 0 E U /dl  0 2, 1 0 E U /dl   BILIRUBIN UA Negative  Negative   BLOOD UA Negative  Negative   SPECIFIC GRAVITY UA 1 023  1 003-1 030     (1) LIPID PANEL, FASTING 28Apr2016 07:09AM Bishop Galan    Order Number: [id number]     Order Number: [id number] Order Number: [id number] Order Number: [id number]  Triglyceride:         Normal              <150 mg/dl       Borderline High    150-199 mg/dl       High               200-499 mg/dl       Very High          >499 mg/dl  Cholesterol:         Desirable        <200 mg/dl      Borderline High 200-239 mg/dl      High             >239 mg/dl  HDL Cholesterol:        High    >59 mg/dL      Low     <41 mg/dL  LDL CALCULATED:    This screening LDL is a calculated result  It does not have the accuracy of the Direct Measured LDL in the monitoring of patients with hyperlipidemia and/or statin therapy  Direct Measure LDL (AXG234) must be ordered separately in these patients  Test Name Result Flag Reference   CHOLESTEROL 162 mg/dL     HDL,DIRECT 40 mg/dL  40-60   Specimen collection should occur prior to Metamizole administration due to the potential for falsely depressed results  LDL CHOLESTEROL CALCULATED 111 mg/dL H 0-100   TRIGLYCERIDES 55 mg/dL  <=150   Specimen collection should occur prior to N-Acetylcysteine or Metamizole administration due to the potential for falsely depressed results  (1) ADRENOCORTICOTROPHIN 28Apr2016 [address] Order Number: [id number]    Performed at:  [address] [place] [address]  [id number]  : Joel Orta MD, Phone:  [phone]     Test Name Result Flag Reference   ADRENOCORT  TROP 19 3 pg/mL  7 2 - 63 3   ACTH reference interval for samples collected between 7 and 10 AM      (1) RENIN ACTIVITY 28Apr2016 07:09AM [facility] Eugenia AYALA Order Number: [id number]    Performed at:  [address]  [id number]  : Thalia Cornejo MD, Phone:  [phone]     Test Name Result Flag Reference   RENIN 0 49 ng/mL/hr     Adult Normal Salt                         Intake:                           Upright         1 31 -  3 95                           Supine          0 15 -  2 33                        Salt Excretion                         (Na mEq/24 hr):                           Na=   0 -  30   8 82 - 23 86                           Na=  30 -  75   4 09 -  7 73                           Na=  75 - 150   1 44 -  2 80                           Na=      >150 0 39 -  1 31

## 2018-01-15 NOTE — RESULT NOTES
Verified Results  (1) URINALYSIS w URINE C/S REFLEX (will reflex a microscopy if leukocytes, occult blood, or nitrites are not within normal limits) [id number] 07:05AM [employer]    Order Number: [id number]     Test Name Result Flag Reference   COLOR Yellow     CLARITY Clear     PH UA 6 5  4 5-8 0   LEUKOCYTE ESTERASE UA Negative  Negative   NITRITE UA Negative  Negative   PROTEIN UA Negative mg/dl  Negative   GLUCOSE UA Negative mg/dl  Negative   KETONES UA Negative mg/dl  Negative   UROBILINOGEN UA 0 2 E U /dl  0 2, 1 0 E U /dl   BILIRUBIN UA Negative  Negative   BLOOD UA Negative  Negative   SPECIFIC GRAVITY UA 1 017  1 003-1 030     (1) COMPREHENSIVE METABOLIC PANEL [id number] 13:62SF [employer] Order Number: [id number]     Test Name Result Flag Reference   GLUCOSE,RANDM 85 mg/dL     If the patient is fasting, the ADA then defines impaired fasting glucose as > 100 mg/dL and diabetes as > or equal to 123 mg/dL  SODIUM 142 mmol/L  136-145   POTASSIUM 3 3 mmol/L L 3 5-5 3   CHLORIDE 106 mmol/L  100-108   CARBON DIOXIDE 28 mmol/L  21-32   ANION GAP (CALC) 8 mmol/L  4-13   BLOOD UREA NITROGEN 8 mg/dL  5-25   CREATININE 0 73 mg/dL  0 60-1 30   Standardized to IDMS reference method   CALCIUM 8 5 mg/dL  8 3-10 1   BILI, TOTAL 0 52 mg/dL  0 20-1 00   ALK PHOSPHATAS 56 U/L     ALT (SGPT) 24 U/L  12-78   AST(SGOT) 12 U/L  5-45   ALBUMIN 3 5 g/dL  3 5-5 0   TOTAL PROTEIN 7 3 g/dL  6 4-8 2   eGFR Non-African American      >60 0 ml/min/1 73sq [facility] Disease Education Program recommendations are as follows:  GFR calculation is accurate only with a steady state creatinine  Chronic Kidney disease less than 60 ml/min/1 73 sq  meters  Kidney failure less than 15 ml/min/1 73 sq  meters       (1) CBC/PLT/DIFF [id number] 07:05AM [employer]    Order Number: [id number]     Test Name Result Flag Reference   WBC COUNT 4 05 Thousand/uL L 4 31-10 16   RBC COUNT 5 07 Million/uL  3 81-5 12   HEMOGLOBIN 14 1 g/dL  11 5-15 4   HEMATOCRIT 42 4 %  34 8-46  1   MCV 84 fL  82-98   MCH 27 8 pg  26 8-34 3   MCHC 33 3 g/dL  31 4-37 4   RDW 13 3 %  11 6-15 1   MPV 11 3 fL  8 9-12 7   PLATELET COUNT 423 Thousands/uL  149-390   nRBC AUTOMATED 0 /100 WBCs     NEUTROPHILS RELATIVE PERCENT 30 % L 43-75   LYMPHOCYTES RELATIVE PERCENT 61 % H 14-44   MONOCYTES RELATIVE PERCENT 7 %  4-12   EOSINOPHILS RELATIVE PERCENT 2 %  0-6   BASOPHILS RELATIVE PERCENT 0 %  0-1   NEUTROPHILS ABSOLUTE COUNT 1 21 Thousands/? ??L L 1 85-7 62   LYMPHOCYTES ABSOLUTE COUNT 2 47 Thousands/? ??L  0 60-4 47   MONOCYTES ABSOLUTE COUNT 0 29 Thousand/? ??L  0 17-1 22   EOSINOPHILS ABSOLUTE COUNT 0 06 Thousand/? ??L  0 00-0 61   BASOPHILS ABSOLUTE COUNT 0 01 Thousands/? ??L  0 00-0 10   - Patient Instructions: This bloodwork is non-fasting  Please drink two glasses of water morning of bloodwork  - Patient Instructions: This bloodwork is non-fasting  Please drink two glasses of water morning of bloodwork       (1) MAGNESIUM [id number] 07:05AM Nisa Gutierrez Order Number: [id number]     Test Name Result Flag Reference   MAGNESIUM 2 1 mg/dL  1 6-2 6

## 2018-01-15 NOTE — MISCELLANEOUS
Message  Return to work or school:   Saran Baeza is under my professional care  She was seen in my office on 10/17/2017        Venkatesh Herbert        Signatures   Electronically signed by : Ritu Best, ; Nov 28 2017  8:42AM EST                       (Author)

## 2018-01-15 NOTE — RESULT NOTES
Verified Results  (1) BASIC METABOLIC PROFILE [id number] 07:10AM Paulo Pena Order Number: [id number]     Test Name Result Flag Reference   SODIUM 141 mmol/L  136-145   POTASSIUM 3 7 mmol/L  3 5-5 3   CHLORIDE 108 mmol/L  100-108   CARBON DIOXIDE 27 mmol/L  21-32   ANION GAP (CALC) 6 mmol/L  4-13   BLOOD UREA NITROGEN 9 mg/dL  5-25   CREATININE 0 72 mg/dL  0 60-1 30   Standardized to IDMS reference method   CALCIUM 8 5 mg/dL  8 3-10 1   eGFR 120 ml/min/1 73sq m     National Kidney Disease Education Program recommendations are as follows:  GFR calculation is accurate only with a steady state creatinine  Chronic Kidney disease less than 60 ml/min/1 73 sq  meters  Kidney failure less than 15 ml/min/1 73 sq  meters  GLUCOSE FASTING 83 mg/dL  65-99   Specimen collection should occur prior to Sulfasalazine administration due to the potential for falsely depressed results  Specimen collection should occur prior to Sulfapyridine administration due to the potential for falsely elevated results

## 2018-01-23 VITALS
HEIGHT: 62 IN | SYSTOLIC BLOOD PRESSURE: 130 MMHG | HEART RATE: 73 BPM | BODY MASS INDEX: 24.29 KG/M2 | DIASTOLIC BLOOD PRESSURE: 78 MMHG | WEIGHT: 132 LBS

## 2018-01-23 NOTE — RESULT NOTES
Discussion/Summary   CHOLESTEROL IS FINE! Verified Results  (1) LIPID PANEL, FASTING [id number] 07:09AM Lindsey Singletary   TW Order Number: [id number]     Test Name Result Flag Reference   CHOLESTEROL 190 mg/dL     HDL,DIRECT 57 mg/dL  40-60   Specimen collection should occur prior to Metamizole administration due to the potential for falsley depressed results  LDL CHOLESTEROL CALCULATED 119 mg/dL H 0-100   Triglyceride:        Normal <150 mg/dl   Borderline High 150-199 mg/dl   High 200-499 mg/dl   Very High >499 mg/dl      Cholesterol:       Desirable <200 mg/dl    Borderline High 200-239 mg/dl    High >239 mg/dl      HDL Cholesterol:       High>59 mg/dL    Low <41 mg/dL      This screening LDL is a calculated result  It does not have the accuracy of the Direct Measured LDL in the monitoring of patients with hyperlipidemia and/or statin therapy  Direct Measure LDL (TDT173) must be ordered separately in these patients  TRIGLYCERIDES 68 mg/dL  <=150   Specimen collection should occur prior to N-Acetylcysteine or Metamizole administration due to the potential for falsely depressed results

## 2018-01-23 NOTE — RESULT NOTES
Discussion/Summary   Hepatitis c is negative       Verified Results  (1) RPR [id number] 07:09AM Landy Jose    Order Number: [id number]     Test Name Result Flag Reference   RPR Non-Reactive  Non-Reactive     (1) HEP C ANTIBODY [id number] 07:09AM [facility]     Test Name Result Flag Reference   HEPATITIS C ANTIBODY Non-reactive  Non-reactive

## 2018-02-15 ENCOUNTER — OFFICE VISIT (OUTPATIENT)
Dept: FAMILY MEDICINE CLINIC | Facility: CLINIC | Age: 43
End: 2018-02-15
Payer: COMMERCIAL

## 2018-02-15 VITALS
SYSTOLIC BLOOD PRESSURE: 122 MMHG | DIASTOLIC BLOOD PRESSURE: 78 MMHG | HEART RATE: 72 BPM | HEIGHT: 60 IN | BODY MASS INDEX: 25.32 KG/M2 | TEMPERATURE: 97.3 F | WEIGHT: 129 LBS | RESPIRATION RATE: 16 BRPM

## 2018-02-15 DIAGNOSIS — Z11.3 SCREEN FOR STD (SEXUALLY TRANSMITTED DISEASE): ICD-10-CM

## 2018-02-15 DIAGNOSIS — R63.4 ABNORMAL WEIGHT LOSS: ICD-10-CM

## 2018-02-15 DIAGNOSIS — N89.8 VAGINAL ITCHING: Primary | ICD-10-CM

## 2018-02-15 PROCEDURE — 87660 TRICHOMONAS VAGIN DIR PROBE: CPT | Performed by: FAMILY MEDICINE

## 2018-02-15 PROCEDURE — 87510 GARDNER VAG DNA DIR PROBE: CPT | Performed by: FAMILY MEDICINE

## 2018-02-15 PROCEDURE — 99214 OFFICE O/P EST MOD 30 MIN: CPT | Performed by: FAMILY MEDICINE

## 2018-02-15 PROCEDURE — 87480 CANDIDA DNA DIR PROBE: CPT | Performed by: FAMILY MEDICINE

## 2018-02-15 NOTE — PROGRESS NOTES
Assessment/Plan: Aniya Solo is a 40 y o  female with:   Visit Diagnoses     Vaginal itching    -  Primary- has <1 weeks worth of vaginal itching with normal exam today with only watery discharge  The symptoms have slowly improved  Performed speculum exam with Affirm and GC/Chlamydia, will call with results, in the meantime will treat with Monistat cream x 1 week  Relevant Medications    miconazole (MONISTAT-7) 2 % vaginal cream    Screen for STD (sexually transmitted disease)    - had a normal RPR in Dec 2017  No recent HIV check, but pt is concerned for possible STDs because she isnt sure if it is a monogamous relationship  Relevant Orders    HIV 1/2 AG-AB combo    Abnormal weight loss    - on the way out the door, pt states that she is concerned about a 12 pound weight loss in the last few months, not able to quantify weight loss  Will get initial bloodwork and have pt RTC in 2 weeks to review and further evaluate extent of weight loss  Today, has a pan negative ROS  Relevant Orders    CBC and differential    Comprehensive metabolic panel    TSH, 3rd generation with T4 reflex    HIV 1/2 AG-AB combo         Subjective:   Aniya Solo is a 40 y o  female who presents today with a chief complaint of Vaginal Itching    No new sexual partners  Negative HIV test in 2009      Vaginal Itching   The patient's primary symptoms include genital itching, a genital odor, pelvic pain (tenderness) and vaginal discharge  The patient's pertinent negatives include no genital lesions, genital rash or vaginal bleeding  The current episode started in the past 7 days  The problem occurs 2 to 4 times per day  The pain is mild  She is not pregnant  Pertinent negatives include no abdominal pain, chills, constipation, diarrhea, discolored urine, dysuria, fever, flank pain, frequency, hematuria, nausea, painful intercourse, urgency or vomiting  The vaginal discharge was malodorous, yellow and white  There has been no bleeding  She has not been passing clots  She has tried nothing for the symptoms  Review of Systems   Constitutional: Negative for chills and fever  Gastrointestinal: Negative for abdominal pain, constipation, diarrhea, nausea and vomiting  Genitourinary: Positive for pelvic pain (tenderness) and vaginal discharge  Negative for dysuria, flank pain, frequency, hematuria and urgency  Objective:  /78 (BP Location: Left arm, Patient Position: Sitting, Cuff Size: Large)   Pulse 72   Temp (!) 97 3 °F (36 3 °C)   Resp 16   Ht 4' 11 5" (1 511 m)   Wt 58 5 kg (129 lb)   LMP 01/26/2018 (Approximate)   Breastfeeding? No   BMI 25 62 kg/m²   Physical Exam   Constitutional: She appears well-developed and well-nourished  No distress  HENT:   Head: Normocephalic and atraumatic  Abdominal: Soft  Normal appearance and bowel sounds are normal  There is generalized tenderness  Genitourinary: Vagina normal  There is no rash or tenderness on the right labia  There is no rash or tenderness on the left labia  No erythema or tenderness in the vagina  No signs of injury around the vagina  No vaginal discharge found  Genitourinary Comments: Minimal watery discharge noted   Neurological: She is alert  Skin: Skin is warm and dry  No rash noted  She is not diaphoretic  No erythema  Vitals reviewed  Histories Reviewed 2/15/2018:  Patient's Medications   New Prescriptions    MICONAZOLE (MONISTAT-7) 2 % VAGINAL CREAM    Insert 1 applicator into the vagina daily at bedtime   Previous Medications    HYDROCHLOROTHIAZIDE (HYDRODIURIL) 25 MG TABLET    Take 1 tablet (25 mg total) by mouth daily  POTASSIUM CHLORIDE (K-DUR,KLOR-CON) 10 MEQ TABLET    Take 1 tablet (10 mEq total) by mouth 2 (two) times a day  Modified Medications    No medications on file   Discontinued Medications    No medications on file     No Known Allergies  No past medical history on file    Social History     Social History    Marital status: Single     Spouse name: N/A    Number of children: N/A    Years of education: N/A     Occupational History    Not on file  Social History Main Topics    Smoking status: Never Smoker    Smokeless tobacco: Former User    Alcohol use No    Drug use: No    Sexual activity: Not on file     Other Topics Concern    Not on file     Social History Narrative    No narrative on file     No future appointments  Patient Instructions   Vaginitis   WHAT YOU NEED TO KNOW:   What is vaginitis? Vaginitis is an inflammation or infection of the vagina  What causes vaginitis? Vaginitis is usually caused by bacteria, a virus, or a fungus  The chemicals in bubble baths, soaps, and perfumes can also cause vaginitis  A foreign body inside the vagina can also cause vaginitis  The infection may spread through sexual activity  It can also pass to a baby during birth  What increases my risk for vaginitis? · Diabetes mellitus that is not controlled    · Antibiotics, such as those used to treat fungal vaginitis     · Weakened immune system    · High estrogen levels, such as during pregnancy or from birth control pills    · Smoking    · New or multiple sex partners     · Sexual abuse    · Incorrect care of vagina, such as not wiping from front to back    · Use of spermicide or douche  What are the signs and symptoms of vaginitis? · Tenderness, itching, redness, and swelling     · Foul-smelling odor     · Thick, curd-like discharge     · Thin, gray-white discharge    · Small skin tears or chafing    · Painful sexual intercourse    · Pain when you urinate  How is vaginitis diagnosed? Your healthcare provider will ask about your signs and symptoms and examine you  A sample of discharge from your vagina will be tested for infection  How is vaginitis treated? · Antifungals  are used to treat a fungal infection  They may be given as a cream, gel, or tablet you insert into your vagina       · Antibiotics  are used to fight an infection caused by bacteria  What are the risks of vaginitis? Your infection may return  Left untreated, the bacteria or virus can spread  This can damage organs, such as your fallopian tubes  The infection can spread to your sexual partner if you do not have safe sex  The infection may lead to pregnancy complications, such as  birth or pelvic inflammatory disease  How can I manage my vaginitis? · Wash your vagina  with mild soap and warm water each day  Gently dry the area after washing  · Do not douche  or insert other irritating products into your vagina  · Do not wear  tight-fitting clothes or undergarments  These can make your symptoms worse  · Do not have sex until your symptoms go away  When you have sex, always use a condom  Condoms can help protect you from contact with fluids from your partner that may be causing your vaginitis  How can I prevent vaginitis? · Wipe from front to back  after you urinate  · Do not use irritating products such as bubble baths or perfumed soaps  When should I contact my healthcare provider? · You have a fever  · You have abdominal pain  · Your symptoms get worse, even after treatment  · Your symptoms return  · You have questions or concerns about your condition or care  When should I seek immediate care? · You have unusual vaginal bleeding  · You have severe abdominal pain  CARE AGREEMENT:   You have the right to help plan your care  Learn about your health condition and how it may be treated  Discuss treatment options with your caregivers to decide what care you want to receive  You always have the right to refuse treatment  The above information is an  only  It is not intended as medical advice for individual conditions or treatments  Talk to your doctor, nurse or pharmacist before following any medical regimen to see if it is safe and effective for you    © 2017 [facility] Information is for End User's use only and may not be sold, redistributed or otherwise used for commercial purposes  All illustrations and images included in CareNotes® are the copyrighted property of A D [employer] M [employer]  or Raheem Johnston

## 2018-02-15 NOTE — PATIENT INSTRUCTIONS
Vaginitis   WHAT YOU NEED TO KNOW:   What is vaginitis? Vaginitis is an inflammation or infection of the vagina  What causes vaginitis? Vaginitis is usually caused by bacteria, a virus, or a fungus  The chemicals in bubble baths, soaps, and perfumes can also cause vaginitis  A foreign body inside the vagina can also cause vaginitis  The infection may spread through sexual activity  It can also pass to a baby during birth  What increases my risk for vaginitis? · Diabetes mellitus that is not controlled    · Antibiotics, such as those used to treat fungal vaginitis     · Weakened immune system    · High estrogen levels, such as during pregnancy or from birth control pills    · Smoking    · New or multiple sex partners     · Sexual abuse    · Incorrect care of vagina, such as not wiping from front to back    · Use of spermicide or douche  What are the signs and symptoms of vaginitis? · Tenderness, itching, redness, and swelling     · Foul-smelling odor     · Thick, curd-like discharge     · Thin, gray-white discharge    · Small skin tears or chafing    · Painful sexual intercourse    · Pain when you urinate  How is vaginitis diagnosed? Your healthcare provider will ask about your signs and symptoms and examine you  A sample of discharge from your vagina will be tested for infection  How is vaginitis treated? · Antifungals  are used to treat a fungal infection  They may be given as a cream, gel, or tablet you insert into your vagina  · Antibiotics  are used to fight an infection caused by bacteria  What are the risks of vaginitis? Your infection may return  Left untreated, the bacteria or virus can spread  This can damage organs, such as your fallopian tubes  The infection can spread to your sexual partner if you do not have safe sex  The infection may lead to pregnancy complications, such as  birth or pelvic inflammatory disease  How can I manage my vaginitis?    · Wash your vagina  with mild soap and warm water each day  Gently dry the area after washing  · Do not douche  or insert other irritating products into your vagina  · Do not wear  tight-fitting clothes or undergarments  These can make your symptoms worse  · Do not have sex until your symptoms go away  When you have sex, always use a condom  Condoms can help protect you from contact with fluids from your partner that may be causing your vaginitis  How can I prevent vaginitis? · Wipe from front to back  after you urinate  · Do not use irritating products such as bubble baths or perfumed soaps  When should I contact my healthcare provider? · You have a fever  · You have abdominal pain  · Your symptoms get worse, even after treatment  · Your symptoms return  · You have questions or concerns about your condition or care  When should I seek immediate care? · You have unusual vaginal bleeding  · You have severe abdominal pain  CARE AGREEMENT:   You have the right to help plan your care  Learn about your health condition and how it may be treated  Discuss treatment options with your caregivers to decide what care you want to receive  You always have the right to refuse treatment  The above information is an  only  It is not intended as medical advice for individual conditions or treatments  Talk to your doctor, nurse or pharmacist before following any medical regimen to see if it is safe and effective for you  © 2017 [address] Information is for End User's use only and may not be sold, redistributed or otherwise used for commercial purposes  All illustrations and images included in CareNotes® are the copyrighted property of A D A M [employer]  or Raheem Johnston

## 2018-02-16 ENCOUNTER — TELEPHONE (OUTPATIENT)
Dept: FAMILY MEDICINE CLINIC | Facility: CLINIC | Age: 43
End: 2018-02-16

## 2018-02-16 DIAGNOSIS — Z11.3 SCREEN FOR STD (SEXUALLY TRANSMITTED DISEASE): Primary | ICD-10-CM

## 2018-02-16 LAB
CANDIDA RRNA VAG QL PROBE: NEGATIVE
G VAGINALIS RRNA GENITAL QL PROBE: NEGATIVE
T VAGINALIS RRNA GENITAL QL PROBE: NEGATIVE

## 2018-02-16 NOTE — TELEPHONE ENCOUNTER
Please call patient and let her know that we are unable to check for gonorrhea and chlamydia, we did put a urine test in as an order, so she can get it done at any [facility]

## 2018-02-16 NOTE — TELEPHONE ENCOUNTER
Lab called to inform you that specimen that was collected yesterday was collected incorrectly and they will be unable to run the Gc/ Chlamydia  They will be able to only run the vaginosis study off the affirm that was sent  If you would like to have patient do other two tests you will need to recollect or have her do a urine for Gc/ Chlamydia

## 2018-02-20 ENCOUNTER — APPOINTMENT (OUTPATIENT)
Dept: LAB | Facility: CLINIC | Age: 43
End: 2018-02-20
Payer: COMMERCIAL

## 2018-02-20 DIAGNOSIS — Z11.3 SCREEN FOR STD (SEXUALLY TRANSMITTED DISEASE): ICD-10-CM

## 2018-02-20 DIAGNOSIS — R63.4 ABNORMAL WEIGHT LOSS: ICD-10-CM

## 2018-02-20 LAB
ALBUMIN SERPL BCP-MCNC: 3.8 G/DL (ref 3.5–5)
ALP SERPL-CCNC: 59 U/L (ref 46–116)
ALT SERPL W P-5'-P-CCNC: 29 U/L (ref 12–78)
ANION GAP SERPL CALCULATED.3IONS-SCNC: 7 MMOL/L (ref 4–13)
AST SERPL W P-5'-P-CCNC: 15 U/L (ref 5–45)
BASOPHILS # BLD AUTO: 0.01 THOUSANDS/ΜL (ref 0–0.1)
BASOPHILS NFR BLD AUTO: 0 % (ref 0–1)
BILIRUB SERPL-MCNC: 0.87 MG/DL (ref 0.2–1)
BUN SERPL-MCNC: 11 MG/DL (ref 5–25)
CALCIUM SERPL-MCNC: 8.7 MG/DL (ref 8.3–10.1)
CHLORIDE SERPL-SCNC: 108 MMOL/L (ref 100–108)
CO2 SERPL-SCNC: 27 MMOL/L (ref 21–32)
CREAT SERPL-MCNC: 0.7 MG/DL (ref 0.6–1.3)
EOSINOPHIL # BLD AUTO: 0.04 THOUSAND/ΜL (ref 0–0.61)
EOSINOPHIL NFR BLD AUTO: 1 % (ref 0–6)
ERYTHROCYTE [DISTWIDTH] IN BLOOD BY AUTOMATED COUNT: 13.7 % (ref 11.6–15.1)
GFR SERPL CREATININE-BSD FRML MDRD: 124 ML/MIN/1.73SQ M
GLUCOSE P FAST SERPL-MCNC: 96 MG/DL (ref 65–99)
HCT VFR BLD AUTO: 43.1 % (ref 34.8–46.1)
HGB BLD-MCNC: 14.7 G/DL (ref 11.5–15.4)
LYMPHOCYTES # BLD AUTO: 2.31 THOUSANDS/ΜL (ref 0.6–4.47)
LYMPHOCYTES NFR BLD AUTO: 52 % (ref 14–44)
MCH RBC QN AUTO: 27.9 PG (ref 26.8–34.3)
MCHC RBC AUTO-ENTMCNC: 34.1 G/DL (ref 31.4–37.4)
MCV RBC AUTO: 82 FL (ref 82–98)
MONOCYTES # BLD AUTO: 0.43 THOUSAND/ΜL (ref 0.17–1.22)
MONOCYTES NFR BLD AUTO: 10 % (ref 4–12)
NEUTROPHILS # BLD AUTO: 1.65 THOUSANDS/ΜL (ref 1.85–7.62)
NEUTS SEG NFR BLD AUTO: 37 % (ref 43–75)
NRBC BLD AUTO-RTO: 0 /100 WBCS
PLATELET # BLD AUTO: 192 THOUSANDS/UL (ref 149–390)
PMV BLD AUTO: 11.7 FL (ref 8.9–12.7)
POTASSIUM SERPL-SCNC: 3.9 MMOL/L (ref 3.5–5.3)
PROT SERPL-MCNC: 7.9 G/DL (ref 6.4–8.2)
RBC # BLD AUTO: 5.27 MILLION/UL (ref 3.81–5.12)
SODIUM SERPL-SCNC: 142 MMOL/L (ref 136–145)
TSH SERPL DL<=0.05 MIU/L-ACNC: 1.07 UIU/ML (ref 0.36–3.74)
WBC # BLD AUTO: 4.45 THOUSAND/UL (ref 4.31–10.16)

## 2018-02-20 PROCEDURE — 85025 COMPLETE CBC W/AUTO DIFF WBC: CPT | Performed by: FAMILY MEDICINE

## 2018-02-20 PROCEDURE — 87491 CHLMYD TRACH DNA AMP PROBE: CPT

## 2018-02-20 PROCEDURE — 36415 COLL VENOUS BLD VENIPUNCTURE: CPT | Performed by: FAMILY MEDICINE

## 2018-02-20 PROCEDURE — 80053 COMPREHEN METABOLIC PANEL: CPT | Performed by: FAMILY MEDICINE

## 2018-02-20 PROCEDURE — 87389 HIV-1 AG W/HIV-1&-2 AB AG IA: CPT

## 2018-02-20 PROCEDURE — 87591 N.GONORRHOEAE DNA AMP PROB: CPT

## 2018-02-20 PROCEDURE — 84443 ASSAY THYROID STIM HORMONE: CPT | Performed by: FAMILY MEDICINE

## 2018-02-21 ENCOUNTER — TELEPHONE (OUTPATIENT)
Dept: FAMILY MEDICINE CLINIC | Facility: CLINIC | Age: 43
End: 2018-02-21

## 2018-02-21 LAB
CHLAMYDIA DNA CVX QL NAA+PROBE: NORMAL
HIV 1+2 AB+HIV1 P24 AG SERPL QL IA: NORMAL
N GONORRHOEA DNA GENITAL QL NAA+PROBE: NORMAL

## 2018-02-21 NOTE — TELEPHONE ENCOUNTER
The patients blood tests were all normal, as was her HIV test and her vaginal swabs  and Gonorrhea and Chlamydia   Her discharge is ls likely physiologic

## 2018-02-22 ENCOUNTER — TELEPHONE (OUTPATIENT)
Dept: FAMILY MEDICINE CLINIC | Facility: CLINIC | Age: 43
End: 2018-02-22

## 2018-03-05 NOTE — PROGRESS NOTES
Assessment/Plan:   Mark Avendano is a 40 y o  female with:     1  Weight loss-   No weight loss since our last visit 3 weeks ago, but weight is stable at 129 pounds  But over the last year patient has lost approximately 8 pounds unintentionally  Patient had a normal laboratory workup including CMP, CBC, TSH , HIV, hep C antibody, RPR  Also patient does not have any symptoms concerning for any etiology for weight loss  I advised patient if she develops any symptoms she is to call us, or if she notices increasing weight loss she is supposed to call us but currently does appear stable  We will have her return to clinic in 6 months to recheck her weight at that time  Hypokalemia  Had hypokalemia in the past, was recommended to take Kcl, but instead has been increasing the potassium in her diet  She knows she gets symptoms when its gets too low  On recheck it is 3 9  Hypertension   Had a diagnosis of hypertension in 2016 and had been on hydrochlorothiazide 25 milligrams at that time  That was likely the cause of her hypokalemia  Since then the patient has had multiple normal blood pressures  Patient had me recheck her blood pressure today and twice it was less than 120/80  Hyperlipidemia    LDL of 119 on labs in December 2017, otherwise normal fasting lipid panel  Subjective:   Mark Avendano is a 40 y o  female who presents today with a chief complaint of Follow-up    Saw pt 3 weeks ago for a vaginal infection that has resolved  She had off handed mentioned she was losing weight, so wanted to enquire  She says that she has noticed significant weight loss since her last visit 3 weeks ago, but has nose noticed some weight loss over the last year or so    Prior to recently, the patient did have a diagnosis of hypertension and had been seen a cardiologist because of her VSD and hypertension, she had been on hydrochlorothiazide but cardiologist believes that she did not need to be on antihypertensive so was discontinued  Patient also did have hypokalemia years ago  Patient states that she stop taking supplement, instead she has been eating more root vegetables and trying the increased potassium in her diet  Patient has a relatively pain and negative review of systems for potential causes for weight loss  Wt Readings from Last 3 Encounters:  02/15/18 : 58 5 kg (129 lb)  12/07/17 : 59 9 kg (132 lb)  11/30/17 : 61 8 kg (136 lb 4 oz)      Review of Systems   Constitutional: Positive for unexpected weight change  Negative for chills, fatigue and fever  Respiratory: Negative for cough and shortness of breath  Cardiovascular: Positive for palpitations (occ palpiations with emotional stress)  Negative for chest pain  Gastrointestinal: Positive for nausea (occ nuasea 1-2x/month)  Negative for abdominal pain, constipation, diarrhea and vomiting  Genitourinary: Negative for menstrual problem  Musculoskeletal: Negative for arthralgias and joint swelling  Skin: Negative for rash  Neurological: Negative for dizziness and light-headedness  I have reviewed the patient's PMH, Social History, Medication List and Allergies  Objective:  /76   Pulse 60   Temp (!) 96 5 °F (35 8 °C)   Resp 16   Ht 4' 11 5" (1 511 m)   Wt 58 5 kg (129 lb)   LMP 02/23/2018 (Exact Date)   Breastfeeding? No   BMI 25 62 kg/m²   Physical Exam   Constitutional: She appears well-developed and well-nourished  No distress  HENT:   Head: Normocephalic and atraumatic  Cardiovascular: Normal rate, regular rhythm and normal heart sounds  No murmur heard  Pulmonary/Chest: Effort normal and breath sounds normal  No respiratory distress  She has no wheezes  Abdominal: Soft  Normal appearance and bowel sounds are normal  There is no tenderness  Neurological: She is alert  Skin: She is not diaphoretic  Vitals reviewed         Recent Results (from the past 1008 hour(s))   VAGINOSIS DNA PROBE (AFFIRM) Collection Time: 02/15/18 11:31 AM   Result Value Ref Range    Candida Species Negative Negative    Gardnerella vaginalis Negative Negative    Trichomonas vaginalis Negative Negative   HIV 1/2 AG-AB combo    Collection Time: 02/20/18  7:32 AM   Result Value Ref Range    HIV-1/HIV-2 Ab Non-Reactive Non-Reactive   CBC and differential    Collection Time: 02/20/18  7:33 AM   Result Value Ref Range    WBC 4 45 4 31 - 10 16 Thousand/uL    RBC 5 27 (H) 3 81 - 5 12 Million/uL    Hemoglobin 14 7 11 5 - 15 4 g/dL    Hematocrit 43 1 34 8 - 46 1 %    MCV 82 82 - 98 fL    MCH 27 9 26 8 - 34 3 pg    MCHC 34 1 31 4 - 37 4 g/dL    RDW 13 7 11 6 - 15 1 %    MPV 11 7 8 9 - 12 7 fL    Platelets 269 171 - 112 Thousands/uL    nRBC 0 /100 WBCs    Neutrophils Relative 37 (L) 43 - 75 %    Lymphocytes Relative 52 (H) 14 - 44 %    Monocytes Relative 10 4 - 12 %    Eosinophils Relative 1 0 - 6 %    Basophils Relative 0 0 - 1 %    Neutrophils Absolute 1 65 (L) 1 85 - 7 62 Thousands/µL    Lymphocytes Absolute 2 31 0 60 - 4 47 Thousands/µL    Monocytes Absolute 0 43 0 17 - 1 22 Thousand/µL    Eosinophils Absolute 0 04 0 00 - 0 61 Thousand/µL    Basophils Absolute 0 01 0 00 - 0 10 Thousands/µL   Comprehensive metabolic panel    Collection Time: 02/20/18  7:33 AM   Result Value Ref Range    Sodium 142 136 - 145 mmol/L    Potassium 3 9 3 5 - 5 3 mmol/L    Chloride 108 100 - 108 mmol/L    CO2 27 21 - 32 mmol/L    Anion Gap 7 4 - 13 mmol/L    BUN 11 5 - 25 mg/dL    Creatinine 0 70 0 60 - 1 30 mg/dL    Glucose, Fasting 96 65 - 99 mg/dL    Calcium 8 7 8 3 - 10 1 mg/dL    AST 15 5 - 45 U/L    ALT 29 12 - 78 U/L    Alkaline Phosphatase 59 46 - 116 U/L    Total Protein 7 9 6 4 - 8 2 g/dL    Albumin 3 8 3 5 - 5 0 g/dL    Total Bilirubin 0 87 0 20 - 1 00 mg/dL    eGFR 124 ml/min/1 73sq m   TSH, 3rd generation with T4 reflex    Collection Time: 02/20/18  7:33 AM   Result Value Ref Range    TSH 3RD GENERATON 1 070 0 358 - 3 740 uIU/mL   Chlamydia/GC amplified DNA by PCR    Collection Time: 02/20/18  7:33 AM   Result Value Ref Range    N gonorrhoeae, DNA Probe N  gonorrhoeae Amplified DNA Negative N  gonorrhoeae Amplified DNA Negative    Chlamydia, DNA Probe C  trachomatis Amplified DNA Negative C  trachomatis Amplified DNA Negative

## 2018-03-06 ENCOUNTER — OFFICE VISIT (OUTPATIENT)
Dept: FAMILY MEDICINE CLINIC | Facility: CLINIC | Age: 43
End: 2018-03-06
Payer: COMMERCIAL

## 2018-03-06 VITALS
TEMPERATURE: 96.5 F | DIASTOLIC BLOOD PRESSURE: 76 MMHG | SYSTOLIC BLOOD PRESSURE: 108 MMHG | WEIGHT: 129 LBS | HEART RATE: 60 BPM | RESPIRATION RATE: 16 BRPM | BODY MASS INDEX: 25.32 KG/M2 | HEIGHT: 60 IN

## 2018-03-06 DIAGNOSIS — E78.00 PURE HYPERCHOLESTEROLEMIA: ICD-10-CM

## 2018-03-06 DIAGNOSIS — E87.6 HYPOKALEMIA: ICD-10-CM

## 2018-03-06 DIAGNOSIS — R63.4 WEIGHT LOSS: Primary | ICD-10-CM

## 2018-03-06 DIAGNOSIS — I10 ESSENTIAL HYPERTENSION: ICD-10-CM

## 2018-03-06 PROCEDURE — 99214 OFFICE O/P EST MOD 30 MIN: CPT | Performed by: FAMILY MEDICINE

## 2018-03-06 NOTE — PATIENT INSTRUCTIONS
Anorexia in Older Adults   AMBULATORY CARE:   Anorexia  is a loss of appetite, decreased food intake, or both  Your appetite naturally decreases as you get older  You also get full faster than you used to  This occurs because your body needs less energy  Other body changes can also lead to a decreased appetite  Even though some appetite loss is normal, you still need to get enough calories and nutrients to keep you healthy  You can start to lose too much weight if you do not eat as much food as your body needs  Unwanted weight loss can cause health problems, or worsen health problems you already have  You can also become dehydrated if you do not drink enough liquid  Contact your healthcare provider if:   · You are losing weight  · You feel depressed, confused, tired, irritable, and you do not feel like eating  · You have signs of dehydration  Examples include dark yellow urine, dry mouth and lips, dry skin, fast heartbeat, and urinating less than usual     · You have questions or concerns about your condition or care  How to eat healthy and get enough nutrients:   · Choose healthy foods  Eat a variety of fruits, vegetables, whole grains, low-fat dairy foods, lean meats, and other protein foods  Limit foods high in fat, sugar, and salt  Limit or avoid alcohol as directed  Work with a dietitian to help you plan your meals if you need to follow a special diet  A dietitian can also teach you how to modify foods if you have trouble chewing or swallowing  · Snack on healthy foods between meals  if you only eat a small amount during meals  Snacks provide extra healthy nutrients and calories between meals  Examples include fruit, cheese, and whole grain crackers  · Drink liquids as directed  to avoid dehydration  Drink liquids between meals if they cause you to get full too quickly during meals  Ask how much liquid to drink each day and which liquids are best for you       · Use herbs, spices, and flavor enhancers to add flavor to foods  Avoid using herbs and spice blends that also contain sodium  Ask your healthcare provider or dietitian about flavor enhancers  Flavor enhancers with ham, natural fernández, and roast beef flavors can also be sprinkled on food to add flavor  · Share meals with others as often as you can  Eating with others may help you to eat better during meal time  Ask family members, neighbors, or friends to join you for lunch  There are also senior centers where you can meet people, and share meals with them  · Ask family and friends for help  with shopping or preparing foods  Ask for a ride to the grocery store, if needed  How to work with your healthcare provider to stay healthy:   · Tell your healthcare provider about any illnesses or medicines that have decreased your appetite  He may be able to change your medicines  Your healthcare provider may also be able to prescribe medicines that can increase your appetite  · Ask your healthcare provider about nutrition supplements  you can have between meals  Nutrition supplements can provide extra calories and nutrients if you are not getting enough through food  Nutrition supplements are available in liquids, puddings, bars, and soups  · Talk to your healthcare provider about safe physical activities you can do  Physical activity may help to increase your appetite  It can also help to strengthen your muscles and bones  · Ask your healthcare provider about programs that can help you buy food or provide meals  There are programs that provide financial assistance for food if you have a low income  There are also programs in some areas that may be able to deliver healthy prepared meals to your home  © 2017 [address] Information is for End User's use only and may not be sold, redistributed or otherwise used for commercial purposes   All illustrations and images included in CareNotes® are the copyrighted property of A  [employer] M [employer]  or Raheem Johnston  The above information is an  only  It is not intended as medical advice for individual conditions or treatments  Talk to your doctor, nurse or pharmacist before following any medical regimen to see if it is safe and effective for you

## 2018-03-06 NOTE — ASSESSMENT & PLAN NOTE
Had hypokalemia in the past, was recommended to take Kcl, but instead has been increasing the potassium in her diet  She knows she gets symptoms when its gets too low  On recheck it is 3 9

## 2018-03-06 NOTE — ASSESSMENT & PLAN NOTE
Had a diagnosis of hypertension in 2016 and had been on hydrochlorothiazide 25 milligrams at that time  That was likely the cause of her hypokalemia  Since then the patient has had multiple normal blood pressures  Patient had me recheck her blood pressure today and twice it was less than 120/80

## 2018-05-31 ENCOUNTER — HOSPITAL ENCOUNTER (EMERGENCY)
Facility: HOSPITAL | Age: 43
Discharge: HOME | End: 2018-05-31
Attending: EMERGENCY MEDICINE | Admitting: EMERGENCY MEDICINE
Payer: COMMERCIAL

## 2018-05-31 VITALS
HEIGHT: 60 IN | SYSTOLIC BLOOD PRESSURE: 163 MMHG | TEMPERATURE: 98.6 F | WEIGHT: 136.6 LBS | BODY MASS INDEX: 26.82 KG/M2 | RESPIRATION RATE: 16 BRPM | HEART RATE: 66 BPM | DIASTOLIC BLOOD PRESSURE: 106 MMHG | OXYGEN SATURATION: 99 %

## 2018-05-31 DIAGNOSIS — I10 HYPERTENSION: ICD-10-CM

## 2018-05-31 DIAGNOSIS — G44.209 TENSION HEADACHE: Primary | ICD-10-CM

## 2018-05-31 DIAGNOSIS — J30.2 SEASONAL ALLERGIES: ICD-10-CM

## 2018-05-31 PROCEDURE — 99283 EMERGENCY DEPT VISIT LOW MDM: CPT

## 2018-05-31 RX ORDER — LORATADINE 10 MG/1
10 TABLET ORAL DAILY
Qty: 20 TABLET | Refills: 0 | Status: SHIPPED | OUTPATIENT
Start: 2018-05-31 | End: 2019-03-04

## 2018-05-31 RX ORDER — METHOCARBAMOL 500 MG/1
1000 TABLET, FILM COATED ORAL ONCE
Status: COMPLETED | OUTPATIENT
Start: 2018-05-31 | End: 2018-05-31

## 2018-05-31 RX ORDER — FLUTICASONE PROPIONATE 50 MCG
1 SPRAY, SUSPENSION (ML) NASAL DAILY
Qty: 16 G | Refills: 0 | Status: SHIPPED | OUTPATIENT
Start: 2018-05-31 | End: 2019-03-04

## 2018-05-31 RX ORDER — NAPROXEN 500 MG/1
500 TABLET ORAL 2 TIMES DAILY PRN
Qty: 14 TABLET | Refills: 0 | Status: SHIPPED | OUTPATIENT
Start: 2018-05-31 | End: 2019-03-04

## 2018-05-31 RX ORDER — METHOCARBAMOL 750 MG/1
750 TABLET, FILM COATED ORAL 3 TIMES DAILY PRN
Qty: 21 TABLET | Refills: 0 | Status: SHIPPED | OUTPATIENT
Start: 2018-05-31 | End: 2019-03-04

## 2018-05-31 RX ORDER — NAPROXEN 250 MG/1
500 TABLET ORAL ONCE
Status: COMPLETED | OUTPATIENT
Start: 2018-05-31 | End: 2018-05-31

## 2018-05-31 RX ADMIN — NAPROXEN 500 MG: 250 TABLET ORAL at 19:11

## 2018-05-31 RX ADMIN — METHOCARBAMOL 1000 MG: 500 TABLET ORAL at 19:11

## 2018-05-31 NOTE — ED NOTES
Patient provided with blankets, repositioned for comfort, and lights dimmed to assist with relaxation        Julia Dillon RN  05/31/18 [id number]

## 2018-05-31 NOTE — ED PROVIDER NOTES
History  Chief Complaint   Patient presents with    Headache     Pt presents to the ED with headache onset since Monday  Referred to ED by PCP  71-year-old female presents today  Complaining of headache and nasal congestion for 4 days  Has not taken anything for symptoms  Called her PCP to report the symptoms she had been having and was directed to the emergency department for further evaluation  Patient states the headache has been intermittent  Was gradual in onset, denies fever, denies nausea, vomiting, or neurologic deficits  History provided by:  Patient  Headache   Pain location:  Frontal and occipital  Quality:  Dull  Radiates to:  Does not radiate  Severity currently:  5/10  Severity at highest:  5/10  Onset quality:  Gradual  Duration:  4 days  Timing:  Intermittent  Progression:  Waxing and waning  Chronicity:  New  Relieved by:  None tried  Worsened by:  Nothing  Ineffective treatments:  None tried  Associated symptoms: congestion    Associated symptoms: no abdominal pain, no back pain, no blurred vision, no cough, no diarrhea, no dizziness, no drainage, no ear pain, no eye pain, no facial pain and no fatigue        None       Past Medical History:   Diagnosis Date    Allergic conjunctivitis     last assessed 5/14/15    Chest wall contusion     last assessed 16    Cyst of right breast     last assesed 10/12/15    Fibroadenoma of right breast     last assessed 16    Hypertension     last assessed 16    Syphilis        Past Surgical History:   Procedure Laterality Date    BREAST LUMPECTOMY       SECTION  2008    TUBAL LIGATION  2008       Family History   Problem Relation Age of Onset    Hypertension Father      I have reviewed and agree with the history as documented      Social History   Substance Use Topics    Smoking status: Never Smoker    Smokeless tobacco: Never Used    Alcohol use No        Review of Systems   Constitutional: Negative for fatigue  HENT: Positive for congestion  Negative for ear pain and postnasal drip  Eyes: Negative for blurred vision and pain  Respiratory: Negative for cough  Gastrointestinal: Negative for abdominal pain and diarrhea  Musculoskeletal: Negative for back pain  Neurological: Positive for headaches  Negative for dizziness  Physical Exam  Physical Exam   Constitutional: She is oriented to person, place, and time  She appears well-developed and well-nourished  HENT:   Head: Normocephalic and atraumatic  Mouth/Throat: Uvula is midline, oropharynx is clear and moist and mucous membranes are normal  No tonsillar exudate  Eyes: Pupils are equal, round, and reactive to light  Neck: Normal range of motion  Neck supple  Muscular tenderness (  Moderate bilateral paracervical muscle spasm with reproduction of her pain ) present  No spinous process tenderness present  No neck rigidity  No Brudzinski's sign and no Kernig's sign noted  Cardiovascular: Normal rate and regular rhythm  Pulmonary/Chest: Effort normal and breath sounds normal    Abdominal: Soft  Bowel sounds are normal  There is no tenderness  There is no rebound and no guarding  Musculoskeletal: Normal range of motion  Neurological: She is alert and oriented to person, place, and time  No neurologic deficits  Speech is clear and not slurred  No word finding issues  Strength equal upper extremities b/l  Strength equal in lower extremities b/l  Able to hold extremities against gravity x 10 seconds without drift x 4  No pronator drift  No sensory deficit  Skin: Skin is warm and dry  Psychiatric: She has a normal mood and affect  Nursing note and vitals reviewed        Vital Signs  ED Triage Vitals [05/31/18 1745]   Temperature Pulse Respirations Blood Pressure SpO2   98 6 °F (37 °C) 70 18 170/95 100 %      Temp Source Heart Rate Source Patient Position - Orthostatic VS BP Location FiO2 (%)   Oral Monitor Sitting Left arm --      Pain Score       5           Vitals:    05/31/18 1745 05/31/18 1914 05/31/18 1936 05/31/18 2029   BP: 170/95 (!) 186/110 (!) 174/105 (!) 163/106   Pulse: 70 64 62 66   Patient Position - Orthostatic VS: Sitting Sitting Sitting Sitting       Visual Acuity  Visual Acuity      Most Recent Value   L Pupil Size (mm)  3   R Pupil Size (mm)  3          ED Medications  Medications   naproxen (NAPROSYN) tablet 500 mg (500 mg Oral Given 5/31/18 1911)   methocarbamol (ROBAXIN) tablet 1,000 mg (1,000 mg Oral Given 5/31/18 1911)       Diagnostic Studies  Results Reviewed     None                 No orders to display              Procedures  Procedures       Phone Contacts  ED Phone Contact    ED Course                               MDM  Number of Diagnoses or Management Options  Hypertension: new and requires workup  Seasonal allergies: new and requires workup  Tension headache: new and requires workup  Diagnosis management comments: 8:20 PM  Feeling better after naproxen and robaxin  BP taken manually by me and 157/90  Patient had been on amlodipine previously  We discussed 2 options: 1) go back on amlodipine and f/u with PCP as outpatient vs 2) treat her tension headache and allergic rhinitis and f/u with PCP next week for BP check to see if its still elevated and if it is, then she may need to restart the amlodipine  Patient would prefer not to start the amlodipine just yet  Feels better now  No neurologic deficit  Able to ambulate without difficulty  Is requesting discharge  Patient is stable for discharge  RTED instructions reviewed  Will d/c       Risk of Complications, Morbidity, and/or Mortality  Presenting problems: moderate  Diagnostic procedures: moderate  Management options: low    Patient Progress  Patient progress: improved    CritCare Time    Disposition  Final diagnoses:   Tension headache   Seasonal allergies   Hypertension     Time reflects when diagnosis was documented in both MDM as applicable and the Disposition within this note     Time User Action Codes Description Comment    5/31/2018  6:56 PM Migue Ward [A62 972] Tension headache     5/31/2018  6:56 PM Gordon Snell [J30 2] Seasonal allergies     5/31/2018  7:26 PM Gordon Snell Add [address] [facility] Hypertension       ED Disposition     ED Disposition Condition Comment    Discharge  Vonna Cardinal discharge to home/self care  Condition at discharge: Stable        Follow-up Information     Follow up With Specialties Details Why Contact Info Additional [id number] [address], DO Internal Medicine Schedule an appointment as soon as possible for a visit  [address] [place] [address] Emergency Department Emergency Medicine  If symptoms worsen [address]  [phone] [facility],  [address], [place], [address]          Discharge Medication List as of 5/31/2018  7:07 PM      START taking these medications    Details   fluticasone (FLONASE) 50 mcg/act nasal spray 1 spray into each nostril daily, Starting Thu 5/31/2018, Print      loratadine (CLARITIN) 10 mg tablet Take 1 tablet (10 mg total) by mouth daily, Starting u 5/31/2018, Print      methocarbamol (ROBAXIN) 750 mg tablet Take 1 tablet (750 mg total) by mouth 3 (three) times a day as needed for muscle spasms for up to 7 days, Starting u 5/31/2018, Until u 6/7/2018, Print      naproxen (NAPROSYN) 500 mg tablet Take 1 tablet (500 mg total) by mouth 2 (two) times a day as needed for mild pain for up to 7 days, Starting u 5/31/2018, Until Thu 6/7/2018, Print           No discharge procedures on file      ED Provider  Electronically Signed by           Veena Gary DO  05/31/18 9683

## 2018-05-31 NOTE — DISCHARGE INSTRUCTIONS
Allergic Rhinitis   WHAT YOU NEED TO KNOW:   Allergic rhinitis, or hay fever, is swelling of the inside of your nose  The swelling is a reaction to allergens in the air  An allergen can be anything that causes an allergic reaction  Allergies to weeds, grass, trees, or mold often cause seasonal allergic rhinitis  Indoor dust mites, cockroaches, pet dander, or mold can also cause allergic rhinitis  DISCHARGE INSTRUCTIONS:   Call 911 for the following:   · You have chest pain or shortness of breath  Return to the emergency department if:   · You have severe pain  · You cough up blood  Contact your healthcare provider if:   · You have a fever  · You have ear or sinus pain, or a headache  · Your symptoms get worse, even after treatment  · You have yellow, green, brown, or bloody mucus coming from your nose  · Your nose is bleeding or you have pain inside your nose  · You have trouble sleeping because of your symptoms  · You have questions or concerns about your condition or care  Medicines:   · Medicines  help decrease your symptoms and clear your stuffy nose  · Take your medicine as directed  Contact your healthcare provider if you think your medicine is not helping or if you have side effects  Tell him of her if you are allergic to any medicine  Keep a list of the medicines, vitamins, and herbs you take  Include the amounts, and when and why you take them  Bring the list or the pill bottles to follow-up visits  Carry your medicine list with you in case of an emergency  How to manage allergic rhinitis:  The best way to manage allergic rhinitis is to avoid allergens that can trigger your symptoms  Any of the following may help decrease your symptoms:  · Rinse your nose and sinuses  with a salt water solution or use a salt water nasal spray  This will help thin the mucus in your nose and rinse away pollen and dirt  It will also help reduce swelling so you can breathe normally  Ask your healthcare provider how often to rinse your nose  · Reduce exposure to dust mites  Wash sheets and towels in hot water every week  Cover your pillows and mattresses with allergen-free covers  Limit the number of stuffed animals and soft toys your child has  Wash your child's toys in hot water regularly  Vacuum weekly and use a vacuum  with an air filter  If possible, get rid of carpets and curtains  These collect dust and dust mites  · Reduce exposure to pollen  Keep windows and doors closed in your house and car  Stay inside when air pollution or the pollen count is high  Run your air conditioner on recycle, and change air filters often  Shower and wash your hair before bed every night to rinse away pollen  · Reduce exposure to pet dander  If possible, do not keep cats, dogs, birds, or other pets  If you do keep pets in your home, keep them out of bedrooms and carpeted rooms  Bathe them often  · Reduce exposure to mold  Do not spend time in basements  Choose artificial plants instead of live plants  Keep your home's humidity at less than 45%  Do not have ponds or standing water in your home or yard  · Do not smoke  Avoid others who smoke  Ask your healthcare provider for information if you currently smoke and need help to quit  Follow up with your healthcare provider as directed: You may need to see an allergist often to control your symptoms  Write down your questions so you remember to ask them during your visits  © 2017 [address] [employer] Information is for End User's use only and may not be sold, redistributed or otherwise used for commercial purposes  All illustrations and images included in CareNotes® are the copyrighted property of [employer]  or Raheem Johnston  The above information is an  only  It is not intended as medical advice for individual conditions or treatments   Talk to your doctor, nurse or pharmacist before following any medical regimen to see if it is safe and effective for you  Tension Headache   WHAT YOU NEED TO KNOW:   Tension headaches are most often caused by stress, eye strain, or muscle tightness  The pain of a tension headache may start in the forehead or the back of the head  The pain often spreads over the whole head and down into the neck and shoulders  Over-the-counter pain medicine is the most useful and common treatment for a tension headache  Exercise, biofeedback, meditation, or relaxation techniques may also decrease your headache pain  DISCHARGE INSTRUCTIONS:   Return to the emergency department if:   · You have a sudden headache that seems different or much worse than your usual headaches  · You have difficulty seeing, speaking, or moving  · You pass out, become confused, or have a seizure  · You have a headache, fever, and a stiff neck  Contact your healthcare provider if:   · Your headaches continue to get worse  · Your headaches happen so often that they affect your ability to do your work or normal activities  · You need to take medicine to help your headaches more often than your healthcare provider says you should  · Your headaches get so bad that they cause you to vomit  · You have questions or concerns about your condition or care  Medicines:   · NSAIDs , such as ibuprofen, help decrease swelling, pain, and fever  This medicine is available with or without a doctor's order  NSAIDs can cause stomach bleeding or kidney problems in certain people  If you take blood thinner medicine, always ask your healthcare provider if NSAIDs are safe for you  Always read the medicine label and follow directions  · Acetaminophen  decreases pain and fever  It is available without a doctor's order  Ask how much to take and how often to take it  Follow directions   Read the labels of all other medicines you are using to see if they also contain acetaminophen, or ask your doctor or pharmacist  Acetaminophen can cause liver damage if not taken correctly  Do not use more than 4 grams (4,000 milligrams) total of acetaminophen in one day  · Take your medicine as directed  Contact your healthcare provider if you think your medicine is not helping or if you have side effects  Tell him of her if you are allergic to any medicine  Keep a list of the medicines, vitamins, and herbs you take  Include the amounts, and when and why you take them  Bring the list or the pill bottles to follow-up visits  Carry your medicine list with you in case of an emergency  Manage your symptoms:   · Keep a headache record  Include when the headaches start and stop and what made them better  Describe your symptoms, such as how the pain feels, where it is, and how bad it is  Record anything you ate or drank for the past 24 hours before your headache  Bring this to follow-up visits  · Apply heat as directed  Heat may help decrease headache pain and muscle spasms  Apply heat on the area for 20 to 30 minutes every 2 hours for as many days as directed  A warm bath may also help relieve muscle tension and spasms  · Apply ice as directed  Ice may help decrease headache pain  Use an ice pack or put crushed ice in a plastic bag  Cover it with a towel and place it on the area for 15 to 20 minutes every hour as directed  Prevent tension headaches:   · Avoid muscle tension  Do not stay in one position for long periods of time  Use a different pillow if you wake up with sore neck and shoulder muscles  Find ways to relax your muscles, such as massage or resting in a quiet, dark room  · Avoid eye strain  Make sure you have good lighting when you read, sew, or do similar activities  Get yearly eye exams and wear glasses as directed  · Get enough sleep  Get 8 to 10 hours of sleep each night  Create a sleep schedule  Go to bed and wake up at the same times each day   It may be helpful to do something relaxing before bed  Do not watch television right before bed  · Eat a variety of healthy foods  Healthy foods include fruits, vegetables, whole-grain breads, low-fat dairy products, beans, lean meats, and fish  Do not eat foods that trigger your headaches  · Exercise regularly  Exercise helps decrease stress and headaches  Ask about the best exercise plan for you  · Drink liquids as directed  You may need to drink more liquid to prevent dehydration  Dehydration can make a tension headache worse  Ask your healthcare provider how much liquid to drink and which liquids are best for you  Limit caffeine as directed  Caffeine may make a tension headache worse  · Do not drink alcohol  Alcohol can trigger a headache  It can also prevent medicines from stopping your headache  · Do not smoke  Nicotine and other chemicals in cigarettes and cigars can trigger a headache and also cause lung damage  Ask your healthcare provider for information if you currently smoke and need help to quit  E-cigarettes or smokeless tobacco still contain nicotine  Talk to your healthcare provider before you use these products  Follow up with your healthcare provider as directed:  Bring the headache record with you  Write down your questions so you remember to ask them during your visits  © 2017 [facility] [facility] Information is for End User's use only and may not be sold, redistributed or otherwise used for commercial purposes  All illustrations and images included in CareNotes® are the copyrighted property of [employer]  or Raheem Johnston  The above information is an  only  It is not intended as medical advice for individual conditions or treatments  Talk to your doctor, nurse or pharmacist before following any medical regimen to see if it is safe and effective for you

## 2018-06-01 NOTE — ED NOTES
Reviewed discharge instructions, need for f/u apt with PCP, medication education/dosage/instructions and indications to return to emergency care  Reviewed BP management education, and s/s to observe for critically high BP measurements  Patient stated understanding  Alert and in stable condition  Ambulated off unit in no acute distress        Cloretta Opitz, RN  05/31/18 9168

## 2018-06-07 ENCOUNTER — OFFICE VISIT (OUTPATIENT)
Dept: FAMILY MEDICINE CLINIC | Facility: CLINIC | Age: 43
End: 2018-06-07
Payer: COMMERCIAL

## 2018-06-07 VITALS
BODY MASS INDEX: 26.56 KG/M2 | SYSTOLIC BLOOD PRESSURE: 126 MMHG | RESPIRATION RATE: 16 BRPM | TEMPERATURE: 96.2 F | HEART RATE: 60 BPM | WEIGHT: 136 LBS | DIASTOLIC BLOOD PRESSURE: 84 MMHG

## 2018-06-07 DIAGNOSIS — G44.209 TENSION-TYPE HEADACHE, NOT INTRACTABLE, UNSPECIFIED CHRONICITY PATTERN: Primary | ICD-10-CM

## 2018-06-07 DIAGNOSIS — R04.0 EPISTAXIS: ICD-10-CM

## 2018-06-07 PROCEDURE — 99213 OFFICE O/P EST LOW 20 MIN: CPT | Performed by: FAMILY MEDICINE

## 2018-06-07 PROCEDURE — 1036F TOBACCO NON-USER: CPT | Performed by: FAMILY MEDICINE

## 2018-06-07 NOTE — PROGRESS NOTES
Assessment/Plan:         Diagnoses and all orders for this visit:    Tension-type headache, not intractable, unspecified chronicity pattern    Epistaxis         symptom treatment for headaches and nasal congestion  Monitor blood pressures  Recheck as needed      Patient ID: Rufina Pineda is a 40 y o  female  Follow-up visit  Patient was seen in the emergency room on 05/31 with headaches and nose bleeds left nostril  her blood pressure was noted to be elevated at that time 170/95  She has had past history of hypertension  her blood pressures have been stable off HCTZ  No testing emergency room  She was discharged with scripts for Naprosyn 500 mg p r n  Robaxin p r n  Fluticasone nasal spray and Claritin  No further nose bleeds  Minimal headache today  Last labs 02/2018 reviewed see note  The following portions of the patient's history were reviewed and updated as appropriate: allergies, current medications, past family history, past medical history, past social history, past surgical history and problem list     Review of Systems   Constitutional: Negative for appetite change, chills, fatigue, fever and unexpected weight change  HENT: Positive for congestion  Negative for ear pain, rhinorrhea, sinus pressure and sore throat  Eyes: Negative for visual disturbance  Respiratory: Negative for cough, shortness of breath and wheezing  Cardiovascular: Negative for chest pain, palpitations and leg swelling  Gastrointestinal: Negative for abdominal pain, blood in stool, constipation, diarrhea, nausea and vomiting  Skin: Negative for rash  Neurological: Negative for dizziness  See HPI         Objective:      /84   Pulse 60   Temp (!) 96 2 °F (35 7 °C)   Resp 16   Wt 61 7 kg (136 lb)   LMP 05/26/2018 (Approximate)   BMI 26 56 kg/m²          Physical Exam   Constitutional: She is oriented to person, place, and time  She appears well-developed and well-nourished     HENT: Right Ear: Tympanic membrane normal    Left Ear: Tympanic membrane normal    Nose: No mucosal edema or rhinorrhea  No epistaxis  Right sinus exhibits no maxillary sinus tenderness and no frontal sinus tenderness  Left sinus exhibits no maxillary sinus tenderness and no frontal sinus tenderness  Mouth/Throat: Oropharynx is clear and moist and mucous membranes are normal  No oral lesions  Normal dentition  Eyes: Conjunctivae and EOM are normal  Pupils are equal, round, and reactive to light  No scleral icterus  Fundi normal   Neck: No JVD present  No tracheal deviation present  No thyroid mass and no thyromegaly present  Cardiovascular: Normal rate, regular rhythm and normal heart sounds  Exam reveals no gallop  No murmur heard  Pulmonary/Chest: Effort normal and breath sounds normal  No respiratory distress  She has no wheezes  She has no rales  Lymphadenopathy:     She has no cervical adenopathy  Neurological: She is alert and oriented to person, place, and time  No cranial nerve deficit  Skin: No rash noted  Psychiatric: She has a normal mood and affect  Nursing note and vitals reviewed

## 2019-03-04 ENCOUNTER — OFFICE VISIT (OUTPATIENT)
Dept: FAMILY MEDICINE CLINIC | Facility: CLINIC | Age: 44
End: 2019-03-04
Payer: COMMERCIAL

## 2019-03-04 VITALS
DIASTOLIC BLOOD PRESSURE: 98 MMHG | HEIGHT: 59 IN | SYSTOLIC BLOOD PRESSURE: 136 MMHG | OXYGEN SATURATION: 96 % | HEART RATE: 96 BPM | WEIGHT: 136.2 LBS | RESPIRATION RATE: 16 BRPM | TEMPERATURE: 97.4 F | BODY MASS INDEX: 27.46 KG/M2

## 2019-03-04 DIAGNOSIS — I10 BENIGN ESSENTIAL HTN: Primary | ICD-10-CM

## 2019-03-04 DIAGNOSIS — E78.00 PURE HYPERCHOLESTEROLEMIA: ICD-10-CM

## 2019-03-04 DIAGNOSIS — E66.3 OVERWEIGHT (BMI 25.0-29.9): ICD-10-CM

## 2019-03-04 PROCEDURE — 99214 OFFICE O/P EST MOD 30 MIN: CPT | Performed by: FAMILY MEDICINE

## 2019-03-04 RX ORDER — IBUPROFEN 600 MG/1
600 TABLET ORAL EVERY 6 HOURS PRN
COMMUNITY
Start: 2016-10-24 | End: 2019-03-04

## 2019-03-04 RX ORDER — AMLODIPINE BESYLATE 5 MG/1
5 TABLET ORAL DAILY
Qty: 30 TABLET | Refills: 0 | Status: SHIPPED | OUTPATIENT
Start: 2019-03-04 | End: 2019-03-11

## 2019-03-04 NOTE — ASSESSMENT & PLAN NOTE
Wt Readings from Last 3 Encounters:   03/04/19 61 8 kg (136 lb 3 2 oz)   06/07/18 61 7 kg (136 lb)   05/31/18 62 kg (136 lb 9 6 oz)     BMI Counseling: Body mass index is 27 42 kg/m²  Discussed the patient's BMI with her  The BMI is above average  BMI counseling and education was provided to the patient  Nutrition recommendations include reducing portion sizes, reducing fast food intake, reducing intake of saturated fat and trans fat and reducing intake of cholesterol

## 2019-03-04 NOTE — PROGRESS NOTES
FAMILY MEDICINE PROGRESS NOTE  Chicho Lopez 44 y o  female   DATE: March 4, 2019     ASSESSMENT and PLAN:  Chicho Lopez is a 44 y o  female with:     Benign essential HTN  BP Readings from Last 3 Encounters:   03/04/19 136/98   06/07/18 126/84   05/31/18 (!) 163/106     Previously on HCTZ 25mg, but was having symptomatic hypotension and hypokalemia, and had been doing well off of meds  Given elevated BP, try Amlodipine 5mg   Check baseline labs first    Pure hypercholesterolemia  Last Lipid Panel:  Lab Results   Component Value Date    CHOLESTEROL 190 12/04/2017    HDL 57 12/04/2017    LDLCALC 119 (H) 12/04/2017    TRIG 68 12/04/2017     The 10-year ASCVD risk score (Becca Bueno et al , 2013) is: 2 2%    Values used to calculate the score:      Age: 44 years      Sex: Female      Is Non- : Yes      Diabetic: No      Tobacco smoker: No      Systolic Blood Pressure: 481 mmHg      Is BP treated: Yes      HDL Cholesterol: 57 mg/dL      Total Cholesterol: 190 mg/dL    Reviewed healthy, low cholesterol diet  Recheck FLP      Overweight (BMI 25 0-29  9)  Wt Readings from Last 3 Encounters:   03/04/19 61 8 kg (136 lb 3 2 oz)   06/07/18 61 7 kg (136 lb)   05/31/18 62 kg (136 lb 9 6 oz)     BMI Counseling: Body mass index is 27 42 kg/m²  Discussed the patient's BMI with her  The BMI is above average  BMI counseling and education was provided to the patient  Nutrition recommendations include reducing portion sizes, reducing fast food intake, reducing intake of saturated fat and trans fat and reducing intake of cholesterol  RTC 4 weeks for HTN f/u    SUBJECTIVE:  Chicho Lopez is a 44 y o  female who presents today with a chief complaint of Hypertension and Headache  Pt is here with headaches for the past 4-5 days, usually bitemporal, occ in her occipital region  At work, the 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 and it has gotten up to 160/100s  She does not have HA currently     In the past, she had been on HCTZ, but was having LH and hypokalemia, so it was stopped  No recent illnesses, injuries, change in meds  Denies caffeine, tobacco, alcohol and illicit drug use  No recent life stressors  Review of Systems   Eyes: Negative for visual disturbance  Respiratory: Negative for shortness of breath  Cardiovascular: Negative for chest pain and palpitations  Gastrointestinal: Negative for nausea and vomiting  Neurological: Positive for headaches  Negative for dizziness and light-headedness  I have reviewed the patient's PMH, Social History, Medication List and Allergies as appropriate  OBJECTIVE:  /98   Pulse 96   Temp (!) 97 4 °F (36 3 °C)   Resp 16   Ht 4' 11 1" (1 501 m)   Wt 61 8 kg (136 lb 3 2 oz)   LMP 02/15/2019 (Exact Date)   SpO2 96%   Breastfeeding? No   BMI 27 42 kg/m²    Physical Exam   Constitutional: She appears well-developed and well-nourished  No distress  overweight   Cardiovascular: Normal rate, regular rhythm and normal heart sounds  Pulmonary/Chest: Effort normal and breath sounds normal  No respiratory distress  She has no wheezes  She has no rales  Musculoskeletal: She exhibits no edema or tenderness  Skin: She is not diaphoretic  Vitals reviewed  Damaris Lima MD    Note: Portions of the record may have been created with voice recognition software  Occasional wrong word or "sound a like" substitutions may have occurred due to the inherent limitations of voice recognition software  Read the chart carefully and recognize, using context, where substitutions have occurred

## 2019-03-04 NOTE — ASSESSMENT & PLAN NOTE
Last Lipid Panel:  Lab Results   Component Value Date    CHOLESTEROL 190 12/04/2017    HDL 57 12/04/2017    LDLCALC 119 (H) 12/04/2017    TRIG 68 12/04/2017     The 10-year ASCVD risk score (Jayesh Fulton et al , 2013) is: 2 2%    Values used to calculate the score:      Age: 44 years      Sex: Female      Is Non- : Yes      Diabetic: No      Tobacco smoker: No      Systolic Blood Pressure: 182 mmHg      Is BP treated: Yes      HDL Cholesterol: 57 mg/dL      Total Cholesterol: 190 mg/dL    Reviewed healthy, low cholesterol diet  Recheck FLP

## 2019-03-04 NOTE — PATIENT INSTRUCTIONS
DASH Eating Plan   AMBULATORY CARE:   The DASH (Dietary Approaches to Stop Hypertension) Eating Plan  is designed to help prevent or lower high blood pressure  It can also help to lower LDL (bad) cholesterol and decrease your risk for heart disease  The plan is low in sodium, sugar, unhealthy fats, and total fat  It is high in potassium, calcium, magnesium, and fiber  These nutrients are added when you eat more fruits, vegetables, and whole grains  Your sodium limit each day: Your dietitian will tell you how much sodium is safe for you to have each day  People with high blood pressure should have no more than 1,500 to 2,300 mg of sodium in a day  A teaspoon (tsp) of salt has 2,300 mg of sodium  This may seem like a difficult goal, but small changes to the foods you eat can make a big difference  Your healthcare provider or dietitian can help you create a meal plan that follows your sodium limit  How to limit sodium:   · Read food labels  Food labels can help you choose foods that are low in sodium  The amount of sodium is listed in milligrams (mg)  The % Daily Value (DV) column tells you how much of your daily needs are met by 1 serving of the food for each nutrient listed  Choose foods that have less than 5% of the DV of sodium  These foods are considered low in sodium  Foods that have 20% or more of the DV of sodium are considered high in sodium  Avoid foods that have more than 300 mg of sodium in each serving  Choose foods that say low-sodium, reduced-sodium, or no salt added on the food label  · Avoid salt  Do not salt food at the table, and add very little salt to foods during cooking  Use herbs and spices, such as onions, garlic, and salt-free seasonings to add flavor to foods  Try lemon or lime juice or vinegar to give foods a tart flavor  Use hot peppers or a small amount of hot pepper sauce to add a spicy flavor to foods  · Ask about salt substitutes    Ask your healthcare provider if you may use salt substitutes  Some salt substitutes have ingredients that can be harmful if you have certain health conditions  · Choose foods carefully at restaurants  Meals from restaurants, especially fast food restaurants, are often high in sodium  Some restaurants have nutrition information that tells you the amount of sodium in their foods  Ask to have your food prepared with less, or no salt  What you need to know about fats:   · Include healthy fats  Examples are unsaturated fats and omega-3 fatty acids  Unsaturated fats are found in soybean, canola, olive, or sunflower oil, and liquid and soft tub margarines  Omega-3 fatty acids are found in fatty fish, such as salmon, tuna, mackerel, and sardines  It is also found in flaxseed oil and ground flaxseed  · Avoid unhealthy fats  Do not eat unhealthy fats, such as saturated fats and trans fats  Saturated fats are found in foods that contain fat from animals  Examples are fatty meats, whole milk, butter, cream, and other dairy foods  It is also found in shortening, stick margarine, palm oil, and coconut oil  Trans fats are found in fried foods, crackers, chips, and baked goods made with margarine or shortening  Foods to include: With the DASH eating plan, you need to eat a certain number of servings from each food group  This will help you get enough of certain nutrients and limit others  The amount of servings you should eat depends on how many calories you need  Your dietitian can tell you how many calories you need  The number of servings listed next to the food groups below are for people who need about 2,000 calories each day    · Grains:  6 to 8 servings (3 of these servings should be whole-grain foods)    ¨ 1 slice of whole-grain bread     ¨ 1 ounce of dry cereal    ¨ ½ cup of cooked cereal, pasta, or brown rice    · Vegetables and fruits:  4 to 5 servings of fruits and 4 to 5 servings of vegetables    ¨ 1 medium fruit    ¨ ½ cup of frozen, canned (no added salt), or chopped fresh vegetables     ¨ ½ cup of fresh, frozen, dried, or canned fruit (canned in light syrup or fruit juice)    ¨ ½ cup of vegetable or fruit juice    · Dairy:  2 to 3 servings    ¨ 1 cup of nonfat (skim) or 1% milk    ¨ 1½ ounces of fat-free or low-fat cheese    ¨ 6 ounces of nonfat or low-fat yogurt    · Lean meat, poultry, and fish:  6 ounces or less    [employer] (chicken, turkey) with no skin    ¨ Fish (especially fatty fish, such as salmon, fresh tuna, or mackerel)    ¨ Lean beef and pork (loin, round, extra lean hamburger)    ¨ Egg whites and egg substitutes    · Nuts, seeds, and legumes:  4 to 5 servings each week    ¨ ½ cup of cooked beans and peas    ¨ 1½ ounces of unsalted nuts    ¨ 2 tablespoons of peanut butter or seeds    · Sweets and added sugars:  5 or less each week    ¨ 1 tablespoon of sugar, jelly, or jam    ¨ ½ cup of sorbet or gelatin    ¨ 1 cup of lemonade    · Fats:  2 to 3 servings each week    ¨ 1 teaspoon of soft margarine or vegetable oil    ¨ 1 tablespoon of mayonnaise    ¨ 2 tablespoons of salad dressing  Foods to avoid:   · Grains:      [employer], such as doughnuts, pastries, cookies, and biscuits (high in fat and sugar)    ¨ Mixes for cornbread and biscuits, packaged foods, such as bread stuffing, rice and pasta mixes, macaroni and cheese, and instant cereals (high in sodium)    · Fruits and vegetables:      ¨ Regular, canned vegetables (high in sodium)    ¨ Sauerkraut, pickled vegetables, and other foods prepared in brine (high in sodium)    ¨ Fried vegetables or vegetables in butter or high-fat sauces    ¨ Fruit in cream or butter sauce (high in fat)    · Dairy:      ¨ Whole milk, 2% milk, and cream (high in fat)    ¨ Regular cheese and processed cheese (high in fat and sodium)    · Meats and protein foods:      ¨ Smoked or cured meat, such as corned beef, fernández, ham, hot dogs, and sausage (high in fat and sodium)    ¨ Canned beans and canned meats or spreads, such as potted meats, sardines, anchovies, and imitation seafood (high in sodium)    ¨ Deli or lunch meats, such as bologna, ham, turkey, and roast beef (high in sodium)    ¨ High-fat meat (T-bone steak, regular hamburger, and ribs)    ¨ Whole eggs and egg yolks (high in fat)    · Other:      ¨ Seasonings made with salt, such as garlic salt, celery salt, onion salt, seasoned salt, meat tenderizers, and monosodium glutamate (MSG)    ¨ Miso soup and canned or dried soup mixes (high in sodium)    ¨ Regular soy sauce, barbecue sauce, teriyaki sauce, steak sauce, Worcestershire sauce, and most flavored vinegars (high in sodium)    ¨ Regular condiments, such as mustard, ketchup, and salad dressings (high in sodium)    ¨ Gravy and sauces, such as [place] or cheese sauces (high in sodium and fat)    ¨ Drinks high in sugar, such as soda or fruit drinks    [employer] foods, such as salted chips, popcorn, pretzels, pork rinds, salted crackers, and salted nuts    ¨ Frozen foods, such as dinners, entrees, vegetables with sauces, and breaded meats (high in sodium)  Other guidelines to follow:   · Maintain a healthy weight  Your risk for heart disease is higher if you are overweight  Your healthcare provider may suggest that you lose weight if you are overweight  You can lose weight by eating fewer calories and foods that have added sugars and fat  The DASH meal plan can help you do this  Decrease calories by eating smaller portions at each meal and fewer snacks  Ask your healthcare provider for more information about how to lose weight  · Exercise regularly  Regular exercise can help you reach or maintain a healthy weight  Regular exercise can also help decrease your blood pressure and improve your cholesterol levels  Get 30 minutes or more of moderate exercise each day of the week  To lose weight, get at least 60 minutes of exercise  Talk to your healthcare provider about the best exercise program for you      · Limit alcohol  Women should limit alcohol to 1 drink a day  Men should limit alcohol to 2 drinks a day  A drink of alcohol is 12 ounces of beer, 5 ounces of wine, or 1½ ounces of liquor  © 2017 [address] Information is for End User's use only and may not be sold, redistributed or otherwise used for commercial purposes  All illustrations and images included in CareNotes® are the copyrighted property of [employer] , [employer]  or Raheem Johnston  The above information is an  only  It is not intended as medical advice for individual conditions or treatments  Talk to your doctor, nurse or pharmacist before following any medical regimen to see if it is safe and effective for you

## 2019-03-04 NOTE — ASSESSMENT & PLAN NOTE
BP Readings from Last 3 Encounters:   03/04/19 136/98   06/07/18 126/84   05/31/18 (!) 163/106     Previously on HCTZ 25mg, but was having symptomatic hypotension and hypokalemia, and had been doing well off of meds  Given elevated BP, try Amlodipine 5mg   Check baseline labs first

## 2019-03-05 ENCOUNTER — APPOINTMENT (OUTPATIENT)
Dept: LAB | Facility: CLINIC | Age: 44
End: 2019-03-05
Payer: COMMERCIAL

## 2019-03-05 DIAGNOSIS — I10 BENIGN ESSENTIAL HTN: ICD-10-CM

## 2019-03-05 DIAGNOSIS — E78.00 PURE HYPERCHOLESTEROLEMIA: ICD-10-CM

## 2019-03-05 LAB
ANION GAP SERPL CALCULATED.3IONS-SCNC: 5 MMOL/L (ref 4–13)
BUN SERPL-MCNC: 11 MG/DL (ref 5–25)
CALCIUM SERPL-MCNC: 8.4 MG/DL (ref 8.3–10.1)
CHLORIDE SERPL-SCNC: 106 MMOL/L (ref 100–108)
CHOLEST SERPL-MCNC: 202 MG/DL (ref 50–200)
CO2 SERPL-SCNC: 30 MMOL/L (ref 21–32)
CREAT SERPL-MCNC: 0.66 MG/DL (ref 0.6–1.3)
GFR SERPL CREATININE-BSD FRML MDRD: 126 ML/MIN/1.73SQ M
GLUCOSE P FAST SERPL-MCNC: 82 MG/DL (ref 65–99)
HDLC SERPL-MCNC: 69 MG/DL (ref 40–60)
LDLC SERPL CALC-MCNC: 124 MG/DL (ref 0–100)
POTASSIUM SERPL-SCNC: 3.4 MMOL/L (ref 3.5–5.3)
SODIUM SERPL-SCNC: 141 MMOL/L (ref 136–145)
TRIGL SERPL-MCNC: 46 MG/DL
TSH SERPL DL<=0.05 MIU/L-ACNC: 0.76 UIU/ML (ref 0.36–3.74)

## 2019-03-05 PROCEDURE — 36415 COLL VENOUS BLD VENIPUNCTURE: CPT | Performed by: FAMILY MEDICINE

## 2019-03-05 PROCEDURE — 80061 LIPID PANEL: CPT

## 2019-03-05 PROCEDURE — 84443 ASSAY THYROID STIM HORMONE: CPT | Performed by: FAMILY MEDICINE

## 2019-03-05 PROCEDURE — 80048 BASIC METABOLIC PNL TOTAL CA: CPT

## 2019-03-08 ENCOUNTER — TELEPHONE (OUTPATIENT)
Dept: FAMILY MEDICINE CLINIC | Facility: CLINIC | Age: 44
End: 2019-03-08

## 2019-03-08 NOTE — TELEPHONE ENCOUNTER
Her kidney function and electrolytes were normal  Her potassium was borderline, 3 4  Her cholesterol was alittle high, but doesn't need a medication for it

## 2019-03-11 ENCOUNTER — OFFICE VISIT (OUTPATIENT)
Dept: FAMILY MEDICINE CLINIC | Facility: CLINIC | Age: 44
End: 2019-03-11
Payer: COMMERCIAL

## 2019-03-11 VITALS
OXYGEN SATURATION: 99 % | RESPIRATION RATE: 16 BRPM | HEIGHT: 58 IN | BODY MASS INDEX: 28.55 KG/M2 | DIASTOLIC BLOOD PRESSURE: 90 MMHG | TEMPERATURE: 98.7 F | WEIGHT: 136 LBS | HEART RATE: 76 BPM | SYSTOLIC BLOOD PRESSURE: 140 MMHG

## 2019-03-11 DIAGNOSIS — E78.00 PURE HYPERCHOLESTEROLEMIA: ICD-10-CM

## 2019-03-11 DIAGNOSIS — E87.6 HYPOKALEMIA: ICD-10-CM

## 2019-03-11 DIAGNOSIS — I10 BENIGN ESSENTIAL HTN: Primary | ICD-10-CM

## 2019-03-11 PROCEDURE — 3008F BODY MASS INDEX DOCD: CPT | Performed by: FAMILY MEDICINE

## 2019-03-11 PROCEDURE — 1036F TOBACCO NON-USER: CPT | Performed by: FAMILY MEDICINE

## 2019-03-11 PROCEDURE — 99214 OFFICE O/P EST MOD 30 MIN: CPT | Performed by: FAMILY MEDICINE

## 2019-03-11 RX ORDER — HYDROCHLOROTHIAZIDE 12.5 MG/1
12.5 TABLET ORAL DAILY
Qty: 30 TABLET | Refills: 1 | Status: SHIPPED | OUTPATIENT
Start: 2019-03-11 | End: 2019-03-26 | Stop reason: SDUPTHER

## 2019-03-11 RX ORDER — POTASSIUM CHLORIDE 1.5 G/1.77G
20 POWDER, FOR SOLUTION ORAL DAILY
Qty: 30 PACKET | Refills: 1 | Status: SHIPPED | OUTPATIENT
Start: 2019-03-11 | End: 2019-03-26

## 2019-03-11 NOTE — PROGRESS NOTES
FAMILY MEDICINE PROGRESS NOTE  Jaskaran Menendez 44 y o  female   DATE: March 11, 2019     ASSESSMENT and PLAN:  Jaskaran Menendez is a 44 y o  female with:     Pure hypercholesterolemia  Last Lipid Panel:  Lab Results   Component Value Date    CHOLESTEROL 202 (H) 03/05/2019    HDL 69 (H) 03/05/2019    LDLCALC 124 (H) 03/05/2019    TRIG 46 03/05/2019     The 10-year ASCVD risk score (Beverley Appiah et al , 2013) is: 1 7%    Values used to calculate the score:      Age: 44 years      Sex: Female      Is Non- : Yes      Diabetic: No      Tobacco smoker: No      Systolic Blood Pressure: 045 mmHg      Is BP treated: Yes      HDL Cholesterol: 69 mg/dL      Total Cholesterol: 202 mg/dL    Reviewed healthy, low cholesterol diet      Benign essential HTN  BP Readings from Last 3 Encounters:   03/11/19 140/90   03/04/19 136/98   06/07/18 126/84     Results from last 6 Months   Lab Units 03/05/19  0739   POTASSIUM mmol/L 3 4*   CHLORIDE mmol/L 106   CO2 mmol/L 30   BUN mg/dL 11   CREATININE mg/dL 0 66   CALCIUM mg/dL 8 4     Symptomatic, uncontrolled HTN  Patient had adverse drug affects with amlodipine at  Given she has a work-in [employer], would avoid Ace inhibitors  -start hydrochlorothiazide 12 5 mg daily  -F/u in 2 weeks with repeat BMP    Hypokalemia  K+ 3 4 at baseline, likely will decrease with HC? TZ 12 5mg, so will start KCl 20 mEq daily and recheck BMP in 1 5 weeks prior to f/u      SUBJECTIVE:  Jaskaran Menendez is a 44 y o  female who presents today with a chief complaint of Headache and Hypertension  Pt is here for f/u HTN and persistent headache  She was last seen on 3/4/19 and diagnosed with HTN and started on Amlodipine 5mg daily  But the day she took Amlodipine, she got a a headache and neck pain  So she stopped taking it one day and didnt get the side effects  In the past Cardiology had tried Amlodipine, but it caused hypotension, so it was stopped    Her BP this morning was 148/98 and 145/99 at work later in the morning, so she was sent home from work  Review of Systems   Eyes: Negative for visual disturbance  Respiratory: Negative for shortness of breath  Cardiovascular: Negative for chest pain  Neurological: Positive for headaches  Negative for dizziness and light-headedness  I have reviewed the patient's PMH, Social History, Medication List and Allergies as appropriate  OBJECTIVE:  /90   Pulse 76   Temp 98 7 °F (37 1 °C)   Resp 16   Ht 4' 10" (1 473 m)   Wt 61 7 kg (136 lb)   LMP 02/15/2019 (Exact Date)   SpO2 99%   BMI 28 42 kg/m²    Physical Exam   Constitutional: She appears well-developed and well-nourished  No distress  Cardiovascular: Normal rate, regular rhythm and normal heart sounds  Pulmonary/Chest: Effort normal and breath sounds normal  No respiratory distress  She has no wheezes  She has no rales  Skin: She is not diaphoretic  Vitals reviewed  Appointment on 03/05/2019   Component Date Value Ref Range Status    Sodium 03/05/2019 141  136 - 145 mmol/L Final    Potassium 03/05/2019 3 4* 3 5 - 5 3 mmol/L Final    Chloride 03/05/2019 106  100 - 108 mmol/L Final    CO2 03/05/2019 30  21 - 32 mmol/L Final    ANION GAP 03/05/2019 5  4 - 13 mmol/L Final    BUN 03/05/2019 11  5 - 25 mg/dL Final    Creatinine 03/05/2019 0 66  0 60 - 1 30 mg/dL Final    Standardized to IDMS reference method    Glucose, Fasting 03/05/2019 82  65 - 99 mg/dL Final      Specimen collection should occur prior to Sulfasalazine administration due to the potential for falsely depressed results  Specimen collection should occur prior to Sulfapyridine administration due to the potential for falsely elevated results      Calcium 03/05/2019 8 4  8 3 - 10 1 mg/dL Final    eGFR 03/05/2019 126  ml/min/1 73sq m Final    Cholesterol 03/05/2019 202* 50 - 200 mg/dL Final      Cholesterol:       Desirable         <200 mg/dl       Borderline         200-239 mg/dl       High              >239           Triglycerides 03/05/2019 46  <=150 mg/dL Final      Triglyceride:     Normal          <150 mg/dl     Borderline High 150-199 mg/dl     High            200-499 mg/dl        Very High       >499 mg/dl    Specimen collection should occur prior to N-Acetylcysteine or Metamizole administration due to the potential for falsely depressed results   HDL, Direct 03/05/2019 69* 40 - 60 mg/dL Final      HDL Cholesterol:       High    >60 mg/dL       Low     <41 mg/dL  Specimen collection should occur prior to Metamizole administration due to the potential for falsley depressed results   LDL Calculated 03/05/2019 124* 0 - 100 mg/dL Final      LDL Cholesterol:     Optimal           <100 mg/dl     Near Optimal      100-129 mg/dl     Above Optimal       Borderline High 130-159 mg/dl       High            160-189 mg/dl       Very High       >189 mg/dl         This screening LDL is a calculated result  It does not have the accuracy of the Direct Measured LDL in the monitoring of patients with hyperlipidemia and/or statin therapy  Direct Measure LDL (PLT427) must be ordered separately in these patients  Office Visit on 03/04/2019   Component Date Value Ref Range Status    TSH 3RD [facility] 03/05/2019 0 755  0 358 - 3 740 uIU/mL Final      The recommended reference ranges for TSH during pregnancy are as follows:   First trimester 0 1 to 2 5 uIU/mL   Second trimester  0 2 to 3 0 uIU/mL   Third trimester 0 3 to 3 0 uIU/m    Note: Normal ranges may not apply to patients who are transgender, non-binary, or whose legal sex, sex at birth, and gender identity differ  Using supplements with high doses of biotin 20 to more than 300 times greater than the adequate daily intake for adults of 30 mcg/day as established by the [employer], can cause falsely depress results  Homer Stuart MD    Note: Portions of the record may have been created with voice recognition software    Occasional wrong word or "sound a like" substitutions may have occurred due to the inherent limitations of voice recognition software  Read the chart carefully and recognize, using context, where substitutions have occurred

## 2019-03-11 NOTE — ASSESSMENT & PLAN NOTE
Last Lipid Panel:  Lab Results   Component Value Date    CHOLESTEROL 202 (H) 03/05/2019    HDL 69 (H) 03/05/2019    LDLCALC 124 (H) 03/05/2019    TRIG 46 03/05/2019     The 10-year ASCVD risk score (Ladarius Padilla et al , 2013) is: 1 7%    Values used to calculate the score:      Age: 44 years      Sex: Female      Is Non- : Yes      Diabetic: No      Tobacco smoker: No      Systolic Blood Pressure: 906 mmHg      Is BP treated: Yes      HDL Cholesterol: 69 mg/dL      Total Cholesterol: 202 mg/dL    Reviewed healthy, low cholesterol diet

## 2019-03-11 NOTE — ASSESSMENT & PLAN NOTE
K+ 3 4 at baseline, likely will decrease with HC? TZ 12 5mg, so will start KCl 20 mEq daily and recheck BMP in 1 5 weeks prior to f/u

## 2019-03-11 NOTE — ASSESSMENT & PLAN NOTE
BP Readings from Last 3 Encounters:   03/11/19 140/90   03/04/19 136/98   06/07/18 126/84     Results from last 6 Months   Lab Units 03/05/19  0739   POTASSIUM mmol/L 3 4*   CHLORIDE mmol/L 106   CO2 mmol/L 30   BUN mg/dL 11   CREATININE mg/dL 0 66   CALCIUM mg/dL 8 4     Symptomatic, uncontrolled HTN  Patient had adverse drug affects with amlodipine at  Given she has a work-in [employer], would avoid Ace inhibitors  -start hydrochlorothiazide 12 5 mg daily  -F/u in 2 weeks with repeat BMP

## 2019-03-11 NOTE — PATIENT INSTRUCTIONS
Potassium Content of Foods List   WHAT YOU NEED TO KNOW:   Potassium is a mineral that is found in most foods  Potassium helps to balance fluids and minerals in your body  It also helps your body maintain a normal blood pressure  Potassium helps your muscles contract and your nerves function normally  You may need to increase or decrease potassium if you have certain health conditions  DISCHARGE INSTRUCTIONS:   Why you may need to change the amount of potassium you eat:   · You may need more potassium  if you have hypokalemia (low potassium levels) or high blood pressure  You may also need more potassium if you are taking diuretics  Diuretics and certain medicines cause your body to lose potassium  · You may need less potassium  in your diet if you have hyperkalemia (high potassium levels) or kidney disease  Potassium content of fruit:  The amount of potassium in milligrams (mg) contained in each fruit or serving of fruit is listed beside the item    · High-potassium foods (more than 200 mg per serving):      ¨ 1 medium banana (425)    ¨ ½ of a papaya (390)    ¨ ½ cup of prune juice (370)    ¨ ¼ cup of raisins (270)    ¨ 1 medium charanjit (325) or kiwi (240)    ¨ 1 small orange (240) or ½ cup of orange juice (235)    ¨ ½ cup of cubed cantaloupe (215) or diced honeydew melon (200)    ¨ 1 medium pear (200)    · Medium-potassium foods (50 to 200 mg per serving):      ¨ 1 medium peach (185)    ¨ 1 small apple or ½ cup of apple juice (150)    ¨ ½ cup of peaches canned in juice (120)    ¨ ½ cup of canned pineapple (100)    ¨ ½ cup of fresh, sliced strawberries (255)    ¨ ½ cup of watermelon (85)    · Low-potassium foods (less than 50 mg per serving):      ¨ ½ cup of cranberries (45) or cranberry juice cocktail (20)    ¨ ½ cup of nectar of papaya, charanjit, or pear (35)  Potassium content of vegetables:   · High-potassium foods (more than 200 mg per serving):      ¨ 1 medium baked potato, with skin (925)    ¨ 1 baked medium sweet potato, with skin (450)    ¨ ½ cup of tomato or vegetable juice (275), or 1 medium raw tomato (290)    ¨ ½ cup of mushrooms (280)    ¨ ½ cup of fresh brussels sprouts (250)    ¨ ½ cup of cooked zucchini (220) or winter squash (250)    ¨ ¼ of a medium avocado (245)    ¨ ½ cup of broccoli (230)    · Medium-potassium foods (50 to 200 mg per serving):      ¨ ½ cup of corn (195)    ¨ ½ cup of fresh or cooked carrots (180)    ¨ ½ cup of fresh cauliflower (150)    ¨ ½ cup of asparagus (155)    ¨ ½ cup of canned peas (90)     ¨ 1 cup of lettuce, all types (100)    ¨ ½ cup of fresh green beans (90)    ¨ ½ cup of frozen green beans (85)    ¨ ½ cup of cucumber (80)  Potassium content of protein foods:   · High-potassium foods (more than 200 mg per serving):      ¨ ½ cup of cooked lopez beans (400) or lentils (365)    ¨ 1 cup of soy milk (300)    ¨ 3 ounces of baked or broiled salmon (319)    ¨ 3 ounces of roasted turkey, dark meat (250)    ¨ ¼ cup of sunflower seeds (241)    ¨ 3 ounces of cooked lean beef (224)    ¨ 2 tablespoons of smooth peanut butter (210)    · Medium-potassium foods (50 to 200 mg per serving):      ¨ 1 ounce of salted peanuts, almonds, or cashews (200)    ¨ 1 large egg (60 mg)  Potassium content of dairy foods:   · High-potassium foods (more than 200 mg per serving):      ¨ 6 ounces of yogurt (260 to 435)    ¨ 1 cup of nonfat, low-fat, or whole milk (350 to 380)    · Medium-potassium foods (50 to 200 mg per serving):      ¨ ½ cup of ricotta cheese (154)    ¨ ½ cup of vanilla ice cream (131)    ¨ ½ cup of low-fat (2%) cottage cheese (110)    · Low-potassium foods (less than 50 mg per serving):      ¨ 1 ounce of cheese (20 to 30)    Potassium content of grains:   · 1 slice of white bread (30)    · ½ cup of white or brown rice (50)    · ½ cup of spaghetti or macaroni (30)    · 1 flour or corn tortilla (50)    · 1 four-inch waffle (50)  Potassium content of other foods:   · 1 tablespoon of molasses (295)    · 1½ ounces of chocolate (165)    · Some salt substitutes may contain a high amount of potassium  Check the food label to find the amount of potassium it contains  © 2017 [employer]  Information is for End User's use only and may not be sold, redistributed or otherwise used for commercial purposes  All illustrations and images included in CareNotes® are the copyrighted property of [employer]  or Raheem Johnston  The above information is an  only  It is not intended as medical advice for individual conditions or treatments  Talk to your doctor, nurse or pharmacist before following any medical regimen to see if it is safe and effective for you  DASH Eating Plan   AMBULATORY CARE:   The DASH (Dietary Approaches to Stop Hypertension) Eating Plan  is designed to help prevent or lower high blood pressure  It can also help to lower LDL (bad) cholesterol and decrease your risk for heart disease  The plan is low in sodium, sugar, unhealthy fats, and total fat  It is high in potassium, calcium, magnesium, and fiber  These nutrients are added when you eat more fruits, vegetables, and whole grains  Your sodium limit each day: Your dietitian will tell you how much sodium is safe for you to have each day  People with high blood pressure should have no more than 1,500 to 2,300 mg of sodium in a day  A teaspoon (tsp) of salt has 2,300 mg of sodium  This may seem like a difficult goal, but small changes to the foods you eat can make a big difference  Your healthcare provider or dietitian can help you create a meal plan that follows your sodium limit  How to limit sodium:   · Read food labels  Food labels can help you choose foods that are low in sodium  The amount of sodium is listed in milligrams (mg)  The % Daily Value (DV) column tells you how much of your daily needs are met by 1 serving of the food for each nutrient listed   Choose foods that have less than 5% of the DV of sodium  These foods are considered low in sodium  Foods that have 20% or more of the DV of sodium are considered high in sodium  Avoid foods that have more than 300 mg of sodium in each serving  Choose foods that say low-sodium, reduced-sodium, or no salt added on the food label  · Avoid salt  Do not salt food at the table, and add very little salt to foods during cooking  Use herbs and spices, such as onions, garlic, and salt-free seasonings to add flavor to foods  Try lemon or lime juice or vinegar to give foods a tart flavor  Use hot peppers or a small amount of hot pepper sauce to add a spicy flavor to foods  · Ask about salt substitutes  Ask your healthcare provider if you may use salt substitutes  Some salt substitutes have ingredients that can be harmful if you have certain health conditions  · Choose foods carefully at restaurants  Meals from restaurants, especially fast food restaurants, are often high in sodium  Some restaurants have nutrition information that tells you the amount of sodium in their foods  Ask to have your food prepared with less, or no salt  What you need to know about fats:   · Include healthy fats  Examples are unsaturated fats and omega-3 fatty acids  Unsaturated fats are found in soybean, canola, olive, or sunflower oil, and liquid and soft tub margarines  Omega-3 fatty acids are found in fatty fish, such as salmon, tuna, mackerel, and sardines  It is also found in flaxseed oil and ground flaxseed  · Avoid unhealthy fats  Do not eat unhealthy fats, such as saturated fats and trans fats  Saturated fats are found in foods that contain fat from animals  Examples are fatty meats, whole milk, butter, cream, and other dairy foods  It is also found in shortening, stick margarine, palm oil, and coconut oil  Trans fats are found in fried foods, crackers, chips, and baked goods made with margarine or shortening  Foods to include:   With the DASH eating plan, you need to eat a certain number of servings from each food group  This will help you get enough of certain nutrients and limit others  The amount of servings you should eat depends on how many calories you need  Your dietitian can tell you how many calories you need  The number of servings listed next to the food groups below are for people who need about 2,000 calories each day    · Grains:  6 to 8 servings (3 of these servings should be whole-grain foods)    ¨ 1 slice of whole-grain bread     ¨ 1 ounce of dry cereal    ¨ ½ cup of cooked cereal, pasta, or brown rice    · Vegetables and fruits:  4 to 5 servings of fruits and 4 to 5 servings of vegetables    ¨ 1 medium fruit    ¨ ½ cup of frozen, canned (no added salt), or chopped fresh vegetables     ¨ ½ cup of fresh, frozen, dried, or canned fruit (canned in light syrup or fruit juice)    ¨ ½ cup of vegetable or fruit juice    · Dairy:  2 to 3 servings    ¨ 1 cup of nonfat (skim) or 1% milk    ¨ 1½ ounces of fat-free or low-fat cheese    ¨ 6 ounces of nonfat or low-fat yogurt    · Lean meat, poultry, and fish:  6 ounces or less    [employer] (chicken, turkey) with no skin    ¨ Fish (especially fatty fish, such as salmon, fresh tuna, or mackerel)    ¨ Lean beef and pork (loin, round, extra lean hamburger)    ¨ Egg whites and egg substitutes    · Nuts, seeds, and legumes:  4 to 5 servings each week    ¨ ½ cup of cooked beans and peas    ¨ 1½ ounces of unsalted nuts    ¨ 2 tablespoons of peanut butter or seeds    · Sweets and added sugars:  5 or less each week    ¨ 1 tablespoon of sugar, jelly, or jam    ¨ ½ cup of sorbet or gelatin    ¨ 1 cup of lemonade    · Fats:  2 to 3 servings each week    ¨ 1 teaspoon of soft margarine or vegetable oil    ¨ 1 tablespoon of mayonnaise    ¨ 2 tablespoons of salad dressing  Foods to avoid:   · Grains:      [employer], such as doughnuts, pastries, cookies, and biscuits (high in fat and sugar)    ¨ Mixes for cornbread and biscuits, packaged foods, such as bread stuffing, rice and pasta mixes, macaroni and cheese, and instant cereals (high in sodium)    · Fruits and vegetables:      ¨ Regular, canned vegetables (high in sodium)    ¨ Sauerkraut, pickled vegetables, and other foods prepared in brine (high in sodium)    ¨ Fried vegetables or vegetables in butter or high-fat sauces    ¨ Fruit in cream or butter sauce (high in fat)    · Dairy:      ¨ Whole milk, 2% milk, and cream (high in fat)    ¨ Regular cheese and processed cheese (high in fat and sodium)    · Meats and protein foods:      ¨ Smoked or cured meat, such as corned beef, fernández, ham, hot dogs, and sausage (high in fat and sodium)    ¨ Canned beans and canned meats or spreads, such as potted meats, sardines, anchovies, and imitation seafood (high in sodium)    ¨ Deli or lunch meats, such as bologna, ham, turkey, and roast beef (high in sodium)    ¨ High-fat meat (T-bone steak, regular hamburger, and ribs)    ¨ Whole eggs and egg yolks (high in fat)    · Other:      ¨ Seasonings made with salt, such as garlic salt, celery salt, onion salt, seasoned salt, meat tenderizers, and monosodium glutamate (MSG)    ¨ Miso soup and canned or dried soup mixes (high in sodium)    ¨ Regular soy sauce, barbecue sauce, teriyaki sauce, steak sauce, Worcestershire sauce, and most flavored vinegars (high in sodium)    ¨ Regular condiments, such as mustard, ketchup, and salad dressings (high in sodium)    ¨ Gravy and sauces, such as [place] or cheese sauces (high in sodium and fat)    ¨ Drinks high in sugar, such as soda or fruit drinks    [employer] foods, such as salted chips, popcorn, pretzels, pork rinds, salted crackers, and salted nuts    ¨ Frozen foods, such as dinners, entrees, vegetables with sauces, and breaded meats (high in sodium)  Other guidelines to follow:   · Maintain a healthy weight  Your risk for heart disease is higher if you are overweight   Your healthcare provider may suggest that you lose weight if you are overweight  You can lose weight by eating fewer calories and foods that have added sugars and fat  The DASH meal plan can help you do this  Decrease calories by eating smaller portions at each meal and fewer snacks  Ask your healthcare provider for more information about how to lose weight  · Exercise regularly  Regular exercise can help you reach or maintain a healthy weight  Regular exercise can also help decrease your blood pressure and improve your cholesterol levels  Get 30 minutes or more of moderate exercise each day of the week  To lose weight, get at least 60 minutes of exercise  Talk to your healthcare provider about the best exercise program for you  · Limit alcohol  Women should limit alcohol to 1 drink a day  Men should limit alcohol to 2 drinks a day  A drink of alcohol is 12 ounces of beer, 5 ounces of wine, or 1½ ounces of liquor  © 2017 [employer]  Information is for End User's use only and may not be sold, redistributed or otherwise used for commercial purposes  All illustrations and images included in CareNotes® are the copyrighted property of [employer] M [employer] Inc  or Raheem Johnston  The above information is an  only  It is not intended as medical advice for individual conditions or treatments  Talk to your doctor, nurse or pharmacist before following any medical regimen to see if it is safe and effective for you

## 2019-03-25 ENCOUNTER — APPOINTMENT (OUTPATIENT)
Dept: LAB | Facility: CLINIC | Age: 44
End: 2019-03-25
Payer: COMMERCIAL

## 2019-03-25 DIAGNOSIS — I10 BENIGN ESSENTIAL HTN: ICD-10-CM

## 2019-03-25 DIAGNOSIS — E87.6 HYPOKALEMIA: ICD-10-CM

## 2019-03-25 LAB
ANION GAP SERPL CALCULATED.3IONS-SCNC: 5 MMOL/L (ref 4–13)
BUN SERPL-MCNC: 10 MG/DL (ref 5–25)
CALCIUM SERPL-MCNC: 8.3 MG/DL (ref 8.3–10.1)
CHLORIDE SERPL-SCNC: 105 MMOL/L (ref 100–108)
CO2 SERPL-SCNC: 31 MMOL/L (ref 21–32)
CREAT SERPL-MCNC: 0.74 MG/DL (ref 0.6–1.3)
GFR SERPL CREATININE-BSD FRML MDRD: 116 ML/MIN/1.73SQ M
GLUCOSE P FAST SERPL-MCNC: 94 MG/DL (ref 65–99)
POTASSIUM SERPL-SCNC: 3.3 MMOL/L (ref 3.5–5.3)
SODIUM SERPL-SCNC: 141 MMOL/L (ref 136–145)

## 2019-03-25 PROCEDURE — 36415 COLL VENOUS BLD VENIPUNCTURE: CPT

## 2019-03-25 PROCEDURE — 80048 BASIC METABOLIC PNL TOTAL CA: CPT

## 2019-03-26 ENCOUNTER — OFFICE VISIT (OUTPATIENT)
Dept: FAMILY MEDICINE CLINIC | Facility: CLINIC | Age: 44
End: 2019-03-26
Payer: COMMERCIAL

## 2019-03-26 VITALS
WEIGHT: 132 LBS | HEART RATE: 77 BPM | RESPIRATION RATE: 16 BRPM | HEIGHT: 59 IN | DIASTOLIC BLOOD PRESSURE: 92 MMHG | OXYGEN SATURATION: 99 % | SYSTOLIC BLOOD PRESSURE: 132 MMHG | BODY MASS INDEX: 26.61 KG/M2 | TEMPERATURE: 97.5 F

## 2019-03-26 DIAGNOSIS — E87.6 HYPOKALEMIA: ICD-10-CM

## 2019-03-26 DIAGNOSIS — I10 BENIGN ESSENTIAL HTN: Primary | ICD-10-CM

## 2019-03-26 PROCEDURE — 1111F DSCHRG MED/CURRENT MED MERGE: CPT | Performed by: FAMILY MEDICINE

## 2019-03-26 PROCEDURE — 3008F BODY MASS INDEX DOCD: CPT | Performed by: FAMILY MEDICINE

## 2019-03-26 PROCEDURE — 99213 OFFICE O/P EST LOW 20 MIN: CPT | Performed by: FAMILY MEDICINE

## 2019-03-26 RX ORDER — HYDROCHLOROTHIAZIDE 25 MG/1
25 TABLET ORAL DAILY
Qty: 30 TABLET | Refills: 1 | Status: SHIPPED | OUTPATIENT
Start: 2019-03-26 | End: 2019-04-09

## 2019-03-26 RX ORDER — POTASSIUM CHLORIDE 1.5 G/1.77G
20 POWDER, FOR SOLUTION ORAL 2 TIMES DAILY
Qty: 60 PACKET | Refills: 1 | Status: SHIPPED | OUTPATIENT
Start: 2019-03-26 | End: 2019-04-09

## 2019-03-26 NOTE — ASSESSMENT & PLAN NOTE
BP Readings from Last 3 Encounters:   03/26/19 132/92   03/11/19 140/90   03/04/19 136/98     Lab Results   Component Value Date    CREATININE 0 74 03/25/2019    EGFR 116 03/25/2019   Goal BP <140/90  Uncontrolled on current regimen: HCTZ 12 5mg, has noticed symptomatic improvement though  Increase HCTZ to 25mg daily  Recheck BP in 1 month, RTC at that time (appt already scheduled)  Check labs to rule out hyperaldosteronism

## 2019-03-26 NOTE — PROGRESS NOTES
FAMILY MEDICINE PROGRESS NOTE  Mark Avendano 44 y o  female   DATE: March 26, 2019     ASSESSMENT and PLAN:  Mark Avendano is a 44 y o  female with:     Benign essential HTN  BP Readings from Last 3 Encounters:   03/26/19 132/92   03/11/19 140/90   03/04/19 136/98     Lab Results   Component Value Date    CREATININE 0 74 03/25/2019    EGFR 116 03/25/2019   Goal BP <140/90  Uncontrolled on current regimen: HCTZ 12 5mg, has noticed symptomatic improvement though  Increase HCTZ to 25mg daily  Recheck BP in 1 month, RTC at that time (appt already scheduled)  Check labs to rule out hyperaldosteronism    Hypokalemia  Lab Results   Component Value Date    GLUCOSE 94 12/16/2015    CALCIUM 8 3 03/25/2019     12/16/2015    SODIUM 141 03/25/2019    K 3 3 (L) 03/25/2019    CO2 31 03/25/2019     03/25/2019    BUN 10 03/25/2019    CREATININE 0 74 03/25/2019     Likely due to diuretic and does have hypokalemia at baseline  Increase KCL to 20mEq daily      SUBJECTIVE:  Mark Avendano is a 44 y o  female who presents today with a chief complaint of Follow-up  Pt is here for f/u of htn, previously tried Amlodipine, but pt had side effects so self discontinued because of neck pain  Pt was started on HCTZ about 2 weeks ago, but is having hypokalemia that is worsening  Her headaches have since resolved  She has been taking the potassium supplements which has helped with her dry eyes  Hypertension   This is a chronic problem  The current episode started more than 1 month ago  The problem has been gradually improving since onset  The problem is uncontrolled  Pertinent negatives include no anxiety, blurred vision, chest pain, headaches, neck pain, peripheral edema or shortness of breath  Risk factors for coronary artery disease include sedentary lifestyle, family history and stress  Past treatments include calcium channel blockers  The current treatment provides moderate improvement  There are no compliance problems  Review of Systems   Eyes: Negative for blurred vision and visual disturbance  Respiratory: Negative for shortness of breath  Cardiovascular: Negative for chest pain  Musculoskeletal: Negative for neck pain  Neurological: Negative for dizziness and headaches  I have reviewed the patient's PMH, Social History, Medication List and Allergies as appropriate  OBJECTIVE:  /92   Pulse 77   Temp 97 5 °F (36 4 °C)   Resp 16   Ht 4' 11" (1 499 m)   Wt 59 9 kg (132 lb)   LMP 03/19/2019 (Exact Date)   SpO2 99%   Breastfeeding? No   BMI 26 66 kg/m²    Physical Exam   Constitutional: She appears well-developed and well-nourished  No distress  Cardiovascular: Normal rate, regular rhythm and normal heart sounds  Pulmonary/Chest: Effort normal and breath sounds normal  No respiratory distress  She has no wheezes  She has no rales  Skin: She is not diaphoretic  Vitals reviewed  Antionette Villatoro MD    Note: Portions of the record have been created with voice recognition software  Occasional wrong word or "sound a like" substitutions may have occurred due to the inherent limitations of voice recognition software  Read the chart carefully and recognize, using context, where substitutions have occurred

## 2019-03-26 NOTE — ASSESSMENT & PLAN NOTE
Lab Results   Component Value Date    GLUCOSE 94 12/16/2015    CALCIUM 8 3 03/25/2019     12/16/2015    SODIUM 141 03/25/2019    K 3 3 (L) 03/25/2019    CO2 31 03/25/2019     03/25/2019    BUN 10 03/25/2019    CREATININE 0 74 03/25/2019     Likely due to diuretic and does have hypokalemia at baseline  Increase KCL to 20mEq daily

## 2019-04-01 LAB — HBA1C MFR BLD HPLC: 5.6 %

## 2019-04-09 ENCOUNTER — ANNUAL EXAM (OUTPATIENT)
Dept: FAMILY MEDICINE CLINIC | Facility: CLINIC | Age: 44
End: 2019-04-09
Payer: COMMERCIAL

## 2019-04-09 VITALS
TEMPERATURE: 98.1 F | OXYGEN SATURATION: 97 % | SYSTOLIC BLOOD PRESSURE: 120 MMHG | HEART RATE: 82 BPM | DIASTOLIC BLOOD PRESSURE: 88 MMHG | WEIGHT: 132 LBS | BODY MASS INDEX: 26.66 KG/M2 | RESPIRATION RATE: 16 BRPM

## 2019-04-09 DIAGNOSIS — Z12.39 SCREENING FOR BREAST CANCER: ICD-10-CM

## 2019-04-09 DIAGNOSIS — E78.00 PURE HYPERCHOLESTEROLEMIA: ICD-10-CM

## 2019-04-09 DIAGNOSIS — Z11.3 SCREEN FOR STD (SEXUALLY TRANSMITTED DISEASE): ICD-10-CM

## 2019-04-09 DIAGNOSIS — I10 BENIGN ESSENTIAL HTN: Primary | ICD-10-CM

## 2019-04-09 DIAGNOSIS — Z12.4 SCREENING FOR CERVICAL CANCER: ICD-10-CM

## 2019-04-09 DIAGNOSIS — Z83.2 FAMILY HISTORY OF THALASSEMIA: ICD-10-CM

## 2019-04-09 DIAGNOSIS — Z01.419 ENCOUNTER FOR GYNECOLOGICAL EXAMINATION WITHOUT ABNORMAL FINDING: ICD-10-CM

## 2019-04-09 DIAGNOSIS — E87.6 HYPOKALEMIA: ICD-10-CM

## 2019-04-09 PROCEDURE — 87591 N.GONORRHOEAE DNA AMP PROB: CPT | Performed by: FAMILY MEDICINE

## 2019-04-09 PROCEDURE — 87624 HPV HI-RISK TYP POOLED RSLT: CPT | Performed by: FAMILY MEDICINE

## 2019-04-09 PROCEDURE — G0145 SCR C/V CYTO,THINLAYER,RESCR: HCPCS | Performed by: FAMILY MEDICINE

## 2019-04-09 PROCEDURE — 87491 CHLMYD TRACH DNA AMP PROBE: CPT | Performed by: FAMILY MEDICINE

## 2019-04-09 PROCEDURE — S0612 ANNUAL GYNECOLOGICAL EXAMINA: HCPCS | Performed by: FAMILY MEDICINE

## 2019-04-09 PROCEDURE — 99213 OFFICE O/P EST LOW 20 MIN: CPT | Performed by: FAMILY MEDICINE

## 2019-04-11 ENCOUNTER — HOSPITAL ENCOUNTER (OUTPATIENT)
Dept: RADIOLOGY | Age: 44
Discharge: HOME | End: 2019-04-11
Payer: COMMERCIAL

## 2019-04-11 VITALS — HEIGHT: 59 IN | WEIGHT: 132 LBS | BODY MASS INDEX: 26.61 KG/M2

## 2019-04-11 DIAGNOSIS — Z12.39 SCREENING FOR BREAST CANCER: ICD-10-CM

## 2019-04-11 LAB
C TRACH DNA SPEC QL NAA+PROBE: NEGATIVE
N GONORRHOEA DNA SPEC QL NAA+PROBE: NEGATIVE

## 2019-04-11 PROCEDURE — 77067 SCR MAMMO BI INCL CAD: CPT

## 2019-04-12 LAB
HPV HR 12 DNA CVX QL NAA+PROBE: NEGATIVE
HPV16 DNA CVX QL NAA+PROBE: NEGATIVE
HPV18 DNA CVX QL NAA+PROBE: NEGATIVE

## 2019-04-17 LAB
LAB AP GYN PRIMARY INTERPRETATION: NORMAL
Lab: NORMAL

## 2019-05-01 ENCOUNTER — APPOINTMENT (OUTPATIENT)
Dept: LAB | Facility: CLINIC | Age: 44
End: 2019-05-01
Payer: COMMERCIAL

## 2019-05-01 DIAGNOSIS — I10 BENIGN ESSENTIAL HTN: ICD-10-CM

## 2019-05-01 DIAGNOSIS — I10 ESSENTIAL HYPERTENSION, MALIGNANT: Primary | ICD-10-CM

## 2019-05-01 LAB
ANION GAP SERPL CALCULATED.3IONS-SCNC: 2 MMOL/L (ref 4–13)
BUN SERPL-MCNC: 11 MG/DL (ref 5–25)
CALCIUM SERPL-MCNC: 8.3 MG/DL (ref 8.3–10.1)
CHLORIDE SERPL-SCNC: 108 MMOL/L (ref 100–108)
CO2 SERPL-SCNC: 29 MMOL/L (ref 21–32)
CREAT SERPL-MCNC: 0.78 MG/DL (ref 0.6–1.3)
GFR SERPL CREATININE-BSD FRML MDRD: 108 ML/MIN/1.73SQ M
GLUCOSE P FAST SERPL-MCNC: 86 MG/DL (ref 65–99)
POTASSIUM SERPL-SCNC: 3.8 MMOL/L (ref 3.5–5.3)
SODIUM SERPL-SCNC: 139 MMOL/L (ref 136–145)

## 2019-05-01 PROCEDURE — 36415 COLL VENOUS BLD VENIPUNCTURE: CPT

## 2019-05-01 PROCEDURE — 82088 ASSAY OF ALDOSTERONE: CPT

## 2019-05-01 PROCEDURE — 84244 ASSAY OF RENIN: CPT

## 2019-05-01 PROCEDURE — 80048 BASIC METABOLIC PNL TOTAL CA: CPT

## 2019-05-03 ENCOUNTER — CLINICAL SUPPORT (OUTPATIENT)
Dept: FAMILY MEDICINE CLINIC | Facility: CLINIC | Age: 44
End: 2019-05-03
Payer: COMMERCIAL

## 2019-05-03 VITALS
RESPIRATION RATE: 17 BRPM | DIASTOLIC BLOOD PRESSURE: 82 MMHG | HEIGHT: 60 IN | TEMPERATURE: 98.3 F | SYSTOLIC BLOOD PRESSURE: 132 MMHG | WEIGHT: 131 LBS | HEART RATE: 100 BPM | BODY MASS INDEX: 25.72 KG/M2

## 2019-05-03 DIAGNOSIS — I10 ESSENTIAL HYPERTENSION: Primary | ICD-10-CM

## 2019-05-03 PROCEDURE — 99211 OFF/OP EST MAY X REQ PHY/QHP: CPT | Performed by: FAMILY MEDICINE

## 2019-05-03 PROCEDURE — 1111F DSCHRG MED/CURRENT MED MERGE: CPT | Performed by: FAMILY MEDICINE

## 2019-05-04 LAB — RENIN PLAS-CCNC: 0.17 NG/ML/HR (ref 0.17–5.38)

## 2019-05-06 LAB — ALDOST SERPL-MCNC: 13.1 NG/DL (ref 0–30)

## 2019-05-10 ENCOUNTER — HOSPITAL ENCOUNTER (EMERGENCY)
Facility: HOSPITAL | Age: 44
Discharge: HOME | End: 2019-05-10
Attending: EMERGENCY MEDICINE
Payer: COMMERCIAL

## 2019-05-10 ENCOUNTER — APPOINTMENT (EMERGENCY)
Dept: CT IMAGING | Facility: HOSPITAL | Age: 44
End: 2019-05-10
Payer: COMMERCIAL

## 2019-05-10 VITALS
HEART RATE: 98 BPM | DIASTOLIC BLOOD PRESSURE: 98 MMHG | OXYGEN SATURATION: 97 % | TEMPERATURE: 98.8 F | SYSTOLIC BLOOD PRESSURE: 155 MMHG | RESPIRATION RATE: 18 BRPM

## 2019-05-10 DIAGNOSIS — H92.01 RIGHT EAR PAIN: ICD-10-CM
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
## 2019-05-10 PROCEDURE — 70450 CT HEAD/BRAIN W/O DYE: CPT

## 2019-05-10 PROCEDURE — 99284 EMERGENCY DEPT VISIT MOD MDM: CPT | Performed by: PHYSICIAN ASSISTANT

## 2019-05-10 PROCEDURE — 99284 EMERGENCY DEPT VISIT MOD MDM: CPT

## 2019-05-10 RX ORDER — IBUPROFEN 600 MG/1
600 TABLET ORAL EVERY 6 HOURS PRN
Qty: 30 TABLET | Refills: 0 | Status: SHIPPED | OUTPATIENT
Start: 2019-05-10 | End: 2019-12-31 | Stop reason: ALTCHOICE

## 2019-08-06 ENCOUNTER — OFFICE VISIT (OUTPATIENT)
Dept: FAMILY MEDICINE CLINIC | Facility: CLINIC | Age: 44
End: 2019-08-06
Payer: COMMERCIAL

## 2019-08-06 VITALS
WEIGHT: 126.5 LBS | SYSTOLIC BLOOD PRESSURE: 138 MMHG | OXYGEN SATURATION: 97 % | TEMPERATURE: 97.4 F | RESPIRATION RATE: 16 BRPM | DIASTOLIC BLOOD PRESSURE: 88 MMHG | HEART RATE: 76 BPM | BODY MASS INDEX: 25.12 KG/M2

## 2019-08-06 DIAGNOSIS — N94.10 DYSPAREUNIA IN FEMALE: ICD-10-CM

## 2019-08-06 DIAGNOSIS — R39.9 UTI SYMPTOMS: Primary | ICD-10-CM

## 2019-08-06 DIAGNOSIS — Z86.19 HISTORY OF SYPHILIS: ICD-10-CM

## 2019-08-06 LAB
SL AMB  POCT GLUCOSE, UA: ABNORMAL
SL AMB LEUKOCYTE ESTERASE,UA: ABNORMAL
SL AMB POCT BILIRUBIN,UA: ABNORMAL
SL AMB POCT BLOOD,UA: ABNORMAL
SL AMB POCT CLARITY,UA: CLEAR
SL AMB POCT COLOR,UA: YELLOW
SL AMB POCT KETONES,UA: ABNORMAL
SL AMB POCT NITRITE,UA: ABNORMAL
SL AMB POCT PH,UA: 7.5
SL AMB POCT SPECIFIC GRAVITY,UA: 1.01
SL AMB POCT URINE HCG: NEGATIVE
SL AMB POCT URINE PROTEIN: ABNORMAL
SL AMB POCT UROBILINOGEN: ABNORMAL

## 2019-08-06 PROCEDURE — 87086 URINE CULTURE/COLONY COUNT: CPT | Performed by: FAMILY MEDICINE

## 2019-08-06 PROCEDURE — 87591 N.GONORRHOEAE DNA AMP PROB: CPT | Performed by: FAMILY MEDICINE

## 2019-08-06 PROCEDURE — 87491 CHLMYD TRACH DNA AMP PROBE: CPT | Performed by: FAMILY MEDICINE

## 2019-08-06 PROCEDURE — 99214 OFFICE O/P EST MOD 30 MIN: CPT | Performed by: FAMILY MEDICINE

## 2019-08-06 PROCEDURE — 81025 URINE PREGNANCY TEST: CPT | Performed by: FAMILY MEDICINE

## 2019-08-06 PROCEDURE — 81002 URINALYSIS NONAUTO W/O SCOPE: CPT | Performed by: FAMILY MEDICINE

## 2019-08-06 PROCEDURE — 1036F TOBACCO NON-USER: CPT | Performed by: FAMILY MEDICINE

## 2019-08-06 RX ORDER — NITROFURANTOIN 25; 75 MG/1; MG/1
100 CAPSULE ORAL 2 TIMES DAILY
Qty: 10 CAPSULE | Refills: 0 | Status: SHIPPED | OUTPATIENT
Start: 2019-08-06 | End: 2019-08-11

## 2019-08-06 NOTE — PROGRESS NOTES
FAMILY MEDICINE PROGRESS NOTE  aZk Gonzalez 38 y o  female   DATE: August 6, 2019     ASSESSMENT and PLAN:  Zak Gonzalez is a 38 y o  female with:     1  UTI symptoms  Negative UPT, symptoms c/w UTI especially with suprapubic tenderness, though negative urine dip  Will treat empirically with Macrobid while awaiting Urine Cx results  Given her history of syphilis that was treated >3 years ago and intermittent dyspareunia, will check STDs as below  - nitrofurantoin (MACROBID) 100 mg capsule; Take 1 capsule (100 mg total) by mouth 2 (two) times a day for 5 days  Dispense: 10 capsule; Refill: 0    2  History of syphilis  - RPR; Future  - HIV 1/2 AG-AB combo; Future    3  Dyspareunia in female  - RPR; Future  - HIV 1/2 AG-AB combo; Future    Patient agreeable with the plan and expressed understanding  I discucssed signs and symptoms for which to RTC, go to ER or seek urgent medical care  SUBJECTIVE:  Zak Gonzalez is a 38 y o  female who presents today with a chief complaint of Urinary Tract Infection (painful micturation)  Urinary Tract Infection    This is a new problem  The current episode started in the past 7 days  The problem occurs every urination  The problem has been unchanged  The quality of the pain is described as burning  The pain is mild  There has been no fever  She is sexually active  There is no history of pyelonephritis  Associated symptoms include frequency and hesitancy  Pertinent negatives include no chills, discharge, flank pain, hematuria, nausea, sweats, urgency or vomiting  Associated symptoms comments: LMP Maribel 15, 2019, did get blood when she wiped x 3 days, but not a full period in July 2019  She has tried nothing for the symptoms  There is no history of recurrent UTIs  Review of Systems   Constitutional: Negative for chills  Gastrointestinal: Negative for nausea and vomiting  Genitourinary: Positive for difficulty urinating, dysuria, frequency and hesitancy   Negative for dyspareunia (intermittent), flank pain, hematuria, urgency, vaginal bleeding, vaginal discharge and vaginal pain  I have reviewed the patient's Past Medical History  OBJECTIVE:  /88   Pulse 76   Temp (!) 97 4 °F (36 3 °C)   Resp 16   Wt 57 4 kg (126 lb 8 oz)   LMP 06/15/2019 (Exact Date)   SpO2 97%   Breastfeeding? No   BMI 25 12 kg/m²    Physical Exam   Constitutional: She appears well-developed and well-nourished  No distress  Cardiovascular: Normal rate, regular rhythm and normal heart sounds  Pulmonary/Chest: Effort normal and breath sounds normal  No respiratory distress  She has no wheezes  She has no rales  Abdominal: Soft  Bowel sounds are normal  She exhibits no distension  There is tenderness in the suprapubic area  There is no rigidity, no rebound, no guarding and no CVA tenderness  Skin: She is not diaphoretic  Vitals reviewed  Steve Love MD    Note: Portions of the record have been created with voice recognition software  Occasional wrong word or "sound a like" substitutions may have occurred due to the inherent limitations of voice recognition software  Read the chart carefully and recognize, using context, where substitutions have occurred

## 2019-08-07 ENCOUNTER — TELEPHONE (OUTPATIENT)
Dept: FAMILY MEDICINE CLINIC | Facility: CLINIC | Age: 44
End: 2019-08-07

## 2019-08-07 ENCOUNTER — APPOINTMENT (OUTPATIENT)
Dept: LAB | Facility: CLINIC | Age: 44
End: 2019-08-07
Payer: COMMERCIAL

## 2019-08-07 DIAGNOSIS — Z86.19 HISTORY OF SYPHILIS: ICD-10-CM

## 2019-08-07 DIAGNOSIS — N94.10 DYSPAREUNIA IN FEMALE: ICD-10-CM

## 2019-08-07 DIAGNOSIS — Z83.2 FAMILY HISTORY OF THALASSEMIA: ICD-10-CM

## 2019-08-07 LAB
BACTERIA UR CULT: NORMAL
BASOPHILS # BLD AUTO: 0.02 THOUSANDS/ΜL (ref 0–0.1)
BASOPHILS NFR BLD AUTO: 1 % (ref 0–1)
EOSINOPHIL # BLD AUTO: 0.05 THOUSAND/ΜL (ref 0–0.61)
EOSINOPHIL NFR BLD AUTO: 1 % (ref 0–6)
ERYTHROCYTE [DISTWIDTH] IN BLOOD BY AUTOMATED COUNT: 13.1 % (ref 11.6–15.1)
HCT VFR BLD AUTO: 42.3 % (ref 34.8–46.1)
HGB BLD-MCNC: 13.4 G/DL (ref 11.5–15.4)
IMM GRANULOCYTES # BLD AUTO: 0 THOUSAND/UL (ref 0–0.2)
IMM GRANULOCYTES NFR BLD AUTO: 0 % (ref 0–2)
LYMPHOCYTES # BLD AUTO: 2.01 THOUSANDS/ΜL (ref 0.6–4.47)
LYMPHOCYTES NFR BLD AUTO: 51 % (ref 14–44)
MCH RBC QN AUTO: 26.9 PG (ref 26.8–34.3)
MCHC RBC AUTO-ENTMCNC: 31.7 G/DL (ref 31.4–37.4)
MCV RBC AUTO: 85 FL (ref 82–98)
MONOCYTES # BLD AUTO: 0.29 THOUSAND/ΜL (ref 0.17–1.22)
MONOCYTES NFR BLD AUTO: 8 % (ref 4–12)
NEUTROPHILS # BLD AUTO: 1.52 THOUSANDS/ΜL (ref 1.85–7.62)
NEUTS SEG NFR BLD AUTO: 39 % (ref 43–75)
NRBC BLD AUTO-RTO: 0 /100 WBCS
PLATELET # BLD AUTO: 189 THOUSANDS/UL (ref 149–390)
PMV BLD AUTO: 10.9 FL (ref 8.9–12.7)
RBC # BLD AUTO: 4.99 MILLION/UL (ref 3.81–5.12)
RETICS # AUTO: NORMAL 10*3/UL (ref 14097–95744)
RETICS # CALC: 1.04 % (ref 0.37–1.87)
RPR SER QL: NORMAL
WBC # BLD AUTO: 3.89 THOUSAND/UL (ref 4.31–10.16)

## 2019-08-07 PROCEDURE — 86592 SYPHILIS TEST NON-TREP QUAL: CPT

## 2019-08-07 PROCEDURE — 81404 MOPATH PROCEDURE LEVEL 5: CPT

## 2019-08-07 PROCEDURE — 85025 COMPLETE CBC W/AUTO DIFF WBC: CPT | Performed by: FAMILY MEDICINE

## 2019-08-07 PROCEDURE — 36415 COLL VENOUS BLD VENIPUNCTURE: CPT

## 2019-08-07 PROCEDURE — 87389 HIV-1 AG W/HIV-1&-2 AB AG IA: CPT

## 2019-08-07 PROCEDURE — 85045 AUTOMATED RETICULOCYTE COUNT: CPT

## 2019-08-07 NOTE — TELEPHONE ENCOUNTER
Please call Chris Jones and let her know that her labs were negative for syphillis and her urine test was normal, she should just complete the abx I prescribed and call if her symptoms arent better in 1 week    Thank you!     Appointment on 08/07/2019   Component Date Value Ref Range Status    Retic Ct Abs 08/07/2019 52,800  22,[id number] Final    Retic Ct Pct 08/07/2019 1 04  0 37 - 1 87 % Final    RPR 08/07/2019 Non-Reactive  Non-Reactive Final   Office Visit on 08/06/2019   Component Date Value Ref Range Status    Urine Culture 08/06/2019 No Growth <1000 cfu/mL   Final    LEUKOCYTE ESTERASE,UA 08/06/2019 neg   Final    NITRITE,UA 08/06/2019 neg   Final    SL AMB POCT UROBILINOGEN 08/06/2019 neg   Final    POCT URINE PROTEIN 08/06/2019 15(0 15)+-   Final     PH,UA 08/06/2019 7 5   Final    BLOOD,UA 08/06/2019 neg   Final    SPECIFIC GRAVITY,UA 08/06/2019 1 015   Final    KETONES,UA 08/06/2019 neg   Final    BILIRUBIN,UA 08/06/2019 neg   Final    GLUCOSE, UA 08/06/2019 neg   Final     COLOR,UA 08/06/2019 yellow   Final    CLARITY,UA 08/06/2019 clear   Final    URINE HCG 08/06/2019 negative   Final

## 2019-08-08 LAB
C TRACH DNA SPEC QL NAA+PROBE: NEGATIVE
N GONORRHOEA DNA SPEC QL NAA+PROBE: NEGATIVE

## 2019-08-09 LAB — HIV 1+2 AB+HIV1 P24 AG SERPL QL IA: NORMAL

## 2019-08-26 LAB — HBB GENE MUT ANL BLD/T: NORMAL

## 2019-12-30 ENCOUNTER — TELEPHONE (OUTPATIENT)
Dept: FAMILY MEDICINE CLINIC | Facility: CLINIC | Age: 44
End: 2019-12-30

## 2019-12-30 NOTE — TELEPHONE ENCOUNTER
Patient called stating her BP numbers have been running high  She has been feeling tired  She stated Friday it was 146/116 and today 156/121  She does have an apt with Thalia George on Thursday  Should patient be seen sooner? Please advise

## 2019-12-30 NOTE — TELEPHONE ENCOUNTER
Spoke with patient and gave her the message  Offered to move appt up to 12/31/19 instead of 1/2/20, but patient is not able to come in

## 2019-12-30 NOTE — TELEPHONE ENCOUNTER
Yes, ideally she should be seen tomorrow, if her BP stays that elevated or she develops HA, dizziness, change in vision, chest pain, etc, she should go to ER

## 2019-12-31 ENCOUNTER — TELEPHONE (OUTPATIENT)
Dept: FAMILY MEDICINE CLINIC | Facility: CLINIC | Age: 44
End: 2019-12-31

## 2019-12-31 ENCOUNTER — OFFICE VISIT (OUTPATIENT)
Dept: FAMILY MEDICINE CLINIC | Facility: CLINIC | Age: 44
End: 2019-12-31
Payer: COMMERCIAL

## 2019-12-31 VITALS
BODY MASS INDEX: 25.42 KG/M2 | TEMPERATURE: 95.6 F | OXYGEN SATURATION: 99 % | HEART RATE: 102 BPM | SYSTOLIC BLOOD PRESSURE: 132 MMHG | WEIGHT: 128 LBS | RESPIRATION RATE: 16 BRPM | DIASTOLIC BLOOD PRESSURE: 86 MMHG

## 2019-12-31 DIAGNOSIS — E78.00 PURE HYPERCHOLESTEROLEMIA: ICD-10-CM

## 2019-12-31 DIAGNOSIS — E87.6 HYPOKALEMIA: ICD-10-CM

## 2019-12-31 DIAGNOSIS — I10 BENIGN ESSENTIAL HTN: Primary | ICD-10-CM

## 2019-12-31 PROCEDURE — 99214 OFFICE O/P EST MOD 30 MIN: CPT | Performed by: FAMILY MEDICINE

## 2019-12-31 PROCEDURE — 1036F TOBACCO NON-USER: CPT | Performed by: FAMILY MEDICINE

## 2019-12-31 PROCEDURE — 3079F DIAST BP 80-89 MM HG: CPT | Performed by: FAMILY MEDICINE

## 2019-12-31 PROCEDURE — 3075F SYST BP GE 130 - 139MM HG: CPT | Performed by: FAMILY MEDICINE

## 2019-12-31 RX ORDER — LISINOPRIL 10 MG/1
10 TABLET ORAL DAILY
Qty: 30 TABLET | Refills: 1 | Status: SHIPPED | OUTPATIENT
Start: 2019-12-31 | End: 2020-02-20 | Stop reason: SDUPTHER

## 2019-12-31 NOTE — ASSESSMENT & PLAN NOTE
BP Readings from Last 3 Encounters:   12/31/19 132/86   08/06/19 138/88   05/10/19 155/98     Lab Results   Component Value Date    CREATININE 0 78 05/01/2019    EGFR 108 05/01/2019    Recheck by me 136/98  Previously patient had tried hydrochlorothiazide and chlorthalidone, but both cause hypokalemia  Also tried amlodipine buttock post neck pain and myalgias  Although she is  and ACE-inhibitor would not be 1st line, she has tried and failed to received adequate control diuretics and calcium channel blockers, who will start ACE inhibitor which should also help with her hypokalemia    Start lisinopril 10 mg daily, recheck BMP in 1 month, follow up at that time

## 2019-12-31 NOTE — TELEPHONE ENCOUNTER
She can get the lab I ordered today (BMP) done now to see what her levels are now and then we'll repeat it in 1 month

## 2019-12-31 NOTE — TELEPHONE ENCOUNTER
Patient is asking for an order to check her potassium level  She said she has leg cramps  You had mentioned that the new medication you put her on will increase her potassium  She is wondering what her level is now  She doesn't want to wait a month when she has her BMP done

## 2019-12-31 NOTE — PROGRESS NOTES
FAMILY MEDICINE PROGRESS NOTE  Iraida Thompson 38 y o  female   DATE: December 31, 2019     ASSESSMENT and PLAN:  Iraida Thompson is a 38 y o  female with:     Pure hypercholesterolemia  Total cholesterol 218 and elevated LDL  Recommended low cholesterol diet and increase activity    Benign essential HTN  BP Readings from Last 3 Encounters:   12/31/19 132/86   08/06/19 138/88   05/10/19 155/98     Lab Results   Component Value Date    CREATININE 0 78 05/01/2019    EGFR 108 05/01/2019    Recheck by me 136/98  Previously patient had tried hydrochlorothiazide and chlorthalidone, but both cause hypokalemia  Also tried amlodipine buttock post neck pain and myalgias  Although she is  and ACE-inhibitor would not be 1st line, she has tried and failed to received adequate control diuretics and calcium channel blockers, who will start ACE inhibitor which should also help with her hypokalemia  Start lisinopril 10 mg daily, recheck BMP in 1 month, follow up at that time      SUBJECTIVE:  Iraida Thompson is a 38 y o  female who presents today with a chief complaint of Hypertension  Pt has been applying to a new job and had to have a work physical and had multiple elevated BP readings  Pt had multiple readings 146/116 and 154/121 at both those visits and she was concerned  Pt is asymptomatic  Previously she tried HCTZ and Chlorthalidone that caused hypokalemia and she normally has hypokalemia at baseline and is experiencing LE cramps  She has also tried Amlodipine in the past and that gave her neck pain/headaches  Recently had abnormal FLP with elevated total cholesterol and LDL  Review of Systems   Constitutional: Negative for chills, fatigue and fever  Respiratory: Negative for cough and shortness of breath  Cardiovascular: Negative for chest pain and palpitations  Musculoskeletal: Positive for myalgias  Psychiatric/Behavioral: The patient is nervous/anxious        I have reviewed the patient's Past Medical History  Current Outpatient Medications:     lisinopril (ZESTRIL) 10 mg tablet, Take 1 tablet (10 mg total) by mouth daily, Disp: 30 tablet, Rfl: 1    OBJECTIVE:  /86   Pulse 102   Temp (!) 95 6 °F (35 3 °C)   Resp 16   Wt 58 1 kg (128 lb)   LMP 12/09/2019 (Approximate)   SpO2 99%   Breastfeeding? No   BMI 25 42 kg/m²    Physical Exam   Constitutional: She is oriented to person, place, and time  She appears well-developed and well-nourished  No distress  Cardiovascular: Normal rate, regular rhythm and normal heart sounds  No murmur heard  Pulmonary/Chest: Effort normal and breath sounds normal  No respiratory distress  She has no wheezes  She has no rales  Neurological: She is alert and oriented to person, place, and time  Skin: She is not diaphoretic  Psychiatric: She has a normal mood and affect  Vitals reviewed  BMI Counseling: Body mass index is 25 42 kg/m²  The BMI is above normal  Exercise recommendations include exercising 3-5 times per week  Kim Martinez MD    Note: Portions of the record have been created with voice recognition software  Occasional wrong word or "sound a like" substitutions may have occurred due to the inherent limitations of voice recognition software  Read the chart carefully and recognize, using context, where substitutions have occurred

## 2020-01-02 ENCOUNTER — APPOINTMENT (OUTPATIENT)
Dept: LAB | Facility: CLINIC | Age: 45
End: 2020-01-02
Payer: COMMERCIAL

## 2020-01-02 DIAGNOSIS — I10 BENIGN ESSENTIAL HTN: ICD-10-CM

## 2020-01-02 LAB
ANION GAP SERPL CALCULATED.3IONS-SCNC: 4 MMOL/L (ref 4–13)
BUN SERPL-MCNC: 10 MG/DL (ref 5–25)
CALCIUM SERPL-MCNC: 9 MG/DL (ref 8.3–10.1)
CHLORIDE SERPL-SCNC: 108 MMOL/L (ref 100–108)
CO2 SERPL-SCNC: 29 MMOL/L (ref 21–32)
CREAT SERPL-MCNC: 0.84 MG/DL (ref 0.6–1.3)
GFR SERPL CREATININE-BSD FRML MDRD: 98 ML/MIN/1.73SQ M
GLUCOSE P FAST SERPL-MCNC: 93 MG/DL (ref 65–99)
POTASSIUM SERPL-SCNC: 3.9 MMOL/L (ref 3.5–5.3)
SODIUM SERPL-SCNC: 141 MMOL/L (ref 136–145)

## 2020-01-02 PROCEDURE — 36415 COLL VENOUS BLD VENIPUNCTURE: CPT

## 2020-01-02 PROCEDURE — 80048 BASIC METABOLIC PNL TOTAL CA: CPT

## 2020-02-20 ENCOUNTER — OFFICE VISIT (OUTPATIENT)
Dept: FAMILY MEDICINE CLINIC | Facility: CLINIC | Age: 45
End: 2020-02-20
Payer: COMMERCIAL

## 2020-02-20 VITALS
RESPIRATION RATE: 16 BRPM | SYSTOLIC BLOOD PRESSURE: 120 MMHG | BODY MASS INDEX: 24.35 KG/M2 | WEIGHT: 124 LBS | TEMPERATURE: 96.9 F | OXYGEN SATURATION: 99 % | HEART RATE: 80 BPM | DIASTOLIC BLOOD PRESSURE: 90 MMHG | HEIGHT: 60 IN

## 2020-02-20 DIAGNOSIS — I10 BENIGN ESSENTIAL HTN: Primary | ICD-10-CM

## 2020-02-20 DIAGNOSIS — E87.6 HYPOKALEMIA: ICD-10-CM

## 2020-02-20 DIAGNOSIS — R07.81 RIB PAIN ON LEFT SIDE: ICD-10-CM

## 2020-02-20 PROCEDURE — 3074F SYST BP LT 130 MM HG: CPT | Performed by: FAMILY MEDICINE

## 2020-02-20 PROCEDURE — 3080F DIAST BP >= 90 MM HG: CPT | Performed by: FAMILY MEDICINE

## 2020-02-20 PROCEDURE — 99214 OFFICE O/P EST MOD 30 MIN: CPT | Performed by: FAMILY MEDICINE

## 2020-02-20 PROCEDURE — 3008F BODY MASS INDEX DOCD: CPT | Performed by: FAMILY MEDICINE

## 2020-02-20 PROCEDURE — 1036F TOBACCO NON-USER: CPT | Performed by: FAMILY MEDICINE

## 2020-02-20 RX ORDER — LISINOPRIL 20 MG/1
20 TABLET ORAL DAILY
Qty: 30 TABLET | Refills: 1 | Status: SHIPPED | OUTPATIENT
Start: 2020-02-20 | End: 2020-04-27 | Stop reason: SDUPTHER

## 2020-02-20 NOTE — ASSESSMENT & PLAN NOTE
Lab Results   Component Value Date    K 3 9 01/02/2020     Normal at baseline, prior to ACEI, recheck in 1 month

## 2020-02-20 NOTE — ASSESSMENT & PLAN NOTE
Normal exam today, likely MSK pain, advised she can take short course of NSAIDs or Tylenol and if persists, check rib films

## 2020-02-20 NOTE — ASSESSMENT & PLAN NOTE
BP Readings from Last 3 Encounters:   02/20/20 120/90   12/31/19 132/86   08/06/19 138/88     Lab Results   Component Value Date    CREATININE 0 84 01/02/2020    EGFR 98 01/02/2020     Recheck by me 128/94  Uncontrolled on current regimen: Lisinopril 10mg, increase to 20mg, recheck BMP in 1 month  Previously patient had tried hydrochlorothiazide and chlorthalidone, but both cause hypokalemia  Also tried amlodipine buttock post neck pain and myalgias  Although she is  and ACE-inhibitor would not be 1st line, she has tried and failed to received adequate control diuretics and calcium channel blockers, who will start ACE inhibitor which should also help with her hypokalemia

## 2020-02-20 NOTE — PROGRESS NOTES
FAMILY MEDICINE PROGRESS NOTE  Socorro Gaines 38 y o  female   DATE: February 20, 2020     ASSESSMENT and PLAN:  Socorro Gaines is a 38 y o  female with:     Benign essential HTN  BP Readings from Last 3 Encounters:   02/20/20 120/90   12/31/19 132/86   08/06/19 138/88     Lab Results   Component Value Date    CREATININE 0 84 01/02/2020    EGFR 98 01/02/2020     Recheck by me 128/94  Uncontrolled on current regimen: Lisinopril 10mg, increase to 20mg, recheck BMP in 1 month  Previously patient had tried hydrochlorothiazide and chlorthalidone, but both cause hypokalemia  Also tried amlodipine buttock post neck pain and myalgias  Although she is  and ACE-inhibitor would not be 1st line, she has tried and failed to received adequate control diuretics and calcium channel blockers, who will start ACE inhibitor which should also help with her hypokalemia  Hypokalemia  Lab Results   Component Value Date    K 3 9 01/02/2020     Normal at baseline, prior to ACEI, recheck in 1 month    Rib pain on left side  Normal exam today, likely MSK pain, advised she can take short course of NSAIDs or Tylenol and if persists, check rib films    RTC 1 month for annual  visit    SUBJECTIVE:  Socorro Gaines is a 38 y o  female who presents today with a chief complaint of Follow-up and Hypertension  Pt is here for follow up of HTN  She previously tried HCTZ and amlodipine but had hypokalemia and myalgias respectively  1 month ago, she was started on Lisinopril, baseline BMP at that showed normal K+ (3 9)  Patient has been taking lisinopril 10 mg for the last 1 and half months, has not been checking her blood pressures at work like she previously was because she is nervous about the readings  Also since February 11th come patient has been having intermittent left rib pain  States she only feels it in certain positions, worse when she twists her body  She does not notice it when she is sleeping or when she is laying down  Patient has not tried anything over-the-counter because she does not like to take any medications  She denies any relation to food  She denies GI or  symptoms  Patient states that the pain started the same day as her current menstrual period  Review of Systems   Constitutional: Negative for chills and fever  Respiratory: Positive for chest tightness  Negative for cough and shortness of breath  Cardiovascular: Negative for chest pain and palpitations  I have reviewed the patient's Past Medical History  Current Outpatient Medications:     lisinopril (ZESTRIL) 20 mg tablet, Take 1 tablet (20 mg total) by mouth daily, Disp: 30 tablet, Rfl: 1    OBJECTIVE:  /90   Pulse 80   Temp (!) 96 9 °F (36 1 °C)   Resp 16   Ht 5' (1 524 m)   Wt 56 2 kg (124 lb)   LMP 02/11/2020 (Exact Date)   SpO2 99%   Breastfeeding No   BMI 24 22 kg/m²    Physical Exam   Constitutional: She is oriented to person, place, and time  She appears well-developed and well-nourished  No distress  HENT:   Head: Normocephalic and atraumatic  Cardiovascular: Normal rate, regular rhythm and normal heart sounds  No murmur heard  Pulmonary/Chest: Effort normal and breath sounds normal  No respiratory distress  She has no wheezes  She has no rales  She exhibits no tenderness  Abdominal: Soft  Normal appearance and bowel sounds are normal  She exhibits no distension  There is no tenderness  There is no CVA tenderness and negative Triplett's sign  Neurological: She is alert and oriented to person, place, and time  Skin: She is not diaphoretic  Psychiatric: She has a normal mood and affect  Vitals reviewed  Sabina Moss MD    Note: Portions of the record have been created with voice recognition software  Occasional wrong word or "sound a like" substitutions may have occurred due to the inherent limitations of voice recognition software   Read the chart carefully and recognize, using context, where substitutions have occurred

## 2020-04-27 DIAGNOSIS — I10 BENIGN ESSENTIAL HTN: ICD-10-CM

## 2020-04-27 RX ORDER — LISINOPRIL 20 MG/1
20 TABLET ORAL DAILY
Qty: 30 TABLET | Refills: 1 | Status: SHIPPED | OUTPATIENT
Start: 2020-04-27 | End: 2020-05-04 | Stop reason: SDUPTHER

## 2020-04-29 ENCOUNTER — TELEPHONE (OUTPATIENT)
Dept: FAMILY MEDICINE CLINIC | Facility: CLINIC | Age: 45
End: 2020-04-29

## 2020-04-29 ENCOUNTER — APPOINTMENT (OUTPATIENT)
Dept: LAB | Age: 45
End: 2020-04-29
Payer: COMMERCIAL

## 2020-04-29 DIAGNOSIS — I10 BENIGN ESSENTIAL HTN: ICD-10-CM

## 2020-04-29 DIAGNOSIS — E87.6 HYPOKALEMIA: ICD-10-CM

## 2020-04-29 LAB
ANION GAP SERPL CALCULATED.3IONS-SCNC: 2 MMOL/L (ref 4–13)
BUN SERPL-MCNC: 11 MG/DL (ref 5–25)
CALCIUM SERPL-MCNC: 8.6 MG/DL (ref 8.3–10.1)
CHLORIDE SERPL-SCNC: 108 MMOL/L (ref 100–108)
CO2 SERPL-SCNC: 29 MMOL/L (ref 21–32)
CREAT SERPL-MCNC: 0.74 MG/DL (ref 0.6–1.3)
GFR SERPL CREATININE-BSD FRML MDRD: 115 ML/MIN/1.73SQ M
GLUCOSE P FAST SERPL-MCNC: 90 MG/DL (ref 65–99)
POTASSIUM SERPL-SCNC: 4.1 MMOL/L (ref 3.5–5.3)
SODIUM SERPL-SCNC: 139 MMOL/L (ref 136–145)

## 2020-04-29 PROCEDURE — 36415 COLL VENOUS BLD VENIPUNCTURE: CPT

## 2020-04-29 PROCEDURE — 80048 BASIC METABOLIC PNL TOTAL CA: CPT

## 2020-05-04 ENCOUNTER — OFFICE VISIT (OUTPATIENT)
Dept: FAMILY MEDICINE CLINIC | Facility: CLINIC | Age: 45
End: 2020-05-04
Payer: COMMERCIAL

## 2020-05-04 VITALS
BODY MASS INDEX: 23.44 KG/M2 | DIASTOLIC BLOOD PRESSURE: 80 MMHG | OXYGEN SATURATION: 99 % | SYSTOLIC BLOOD PRESSURE: 108 MMHG | TEMPERATURE: 96.9 F | RESPIRATION RATE: 16 BRPM | WEIGHT: 120 LBS | HEART RATE: 75 BPM

## 2020-05-04 DIAGNOSIS — M25.552 PAIN OF BOTH HIP JOINTS: ICD-10-CM

## 2020-05-04 DIAGNOSIS — M79.2 NEUROPATHIC PAIN: ICD-10-CM

## 2020-05-04 DIAGNOSIS — E55.9 VITAMIN D DEFICIENCY: ICD-10-CM

## 2020-05-04 DIAGNOSIS — Z83.2 FAMILY HISTORY OF THALASSEMIA: ICD-10-CM

## 2020-05-04 DIAGNOSIS — I10 BENIGN ESSENTIAL HTN: ICD-10-CM

## 2020-05-04 DIAGNOSIS — R63.4 UNINTENTIONAL WEIGHT LOSS: Primary | ICD-10-CM

## 2020-05-04 DIAGNOSIS — M25.551 PAIN OF BOTH HIP JOINTS: ICD-10-CM

## 2020-05-04 PROBLEM — E66.3 OVERWEIGHT: Status: RESOLVED | Noted: 2019-03-04 | Resolved: 2020-05-04

## 2020-05-04 PROBLEM — R07.81 RIB PAIN ON LEFT SIDE: Status: RESOLVED | Noted: 2020-02-20 | Resolved: 2020-05-04

## 2020-05-04 PROCEDURE — 1036F TOBACCO NON-USER: CPT | Performed by: FAMILY MEDICINE

## 2020-05-04 PROCEDURE — 3079F DIAST BP 80-89 MM HG: CPT | Performed by: FAMILY MEDICINE

## 2020-05-04 PROCEDURE — 3074F SYST BP LT 130 MM HG: CPT | Performed by: FAMILY MEDICINE

## 2020-05-04 PROCEDURE — 99215 OFFICE O/P EST HI 40 MIN: CPT | Performed by: FAMILY MEDICINE

## 2020-05-04 RX ORDER — LISINOPRIL 10 MG/1
10 TABLET ORAL DAILY
Qty: 30 TABLET | Refills: 1 | Status: SHIPPED | OUTPATIENT
Start: 2020-05-04 | End: 2020-06-29 | Stop reason: SDUPTHER

## 2020-05-11 ENCOUNTER — APPOINTMENT (OUTPATIENT)
Dept: LAB | Age: 45
End: 2020-05-11
Payer: COMMERCIAL

## 2020-05-11 DIAGNOSIS — I10 BENIGN ESSENTIAL HTN: ICD-10-CM

## 2020-05-11 DIAGNOSIS — E55.9 VITAMIN D DEFICIENCY: ICD-10-CM

## 2020-05-11 DIAGNOSIS — R63.4 UNINTENTIONAL WEIGHT LOSS: ICD-10-CM

## 2020-05-11 DIAGNOSIS — M25.551 PAIN OF BOTH HIP JOINTS: ICD-10-CM

## 2020-05-11 DIAGNOSIS — Z83.2 FAMILY HISTORY OF THALASSEMIA: ICD-10-CM

## 2020-05-11 DIAGNOSIS — M79.2 NEUROPATHIC PAIN: ICD-10-CM

## 2020-05-11 DIAGNOSIS — M25.552 PAIN OF BOTH HIP JOINTS: ICD-10-CM

## 2020-05-11 LAB
25(OH)D3 SERPL-MCNC: 23.6 NG/ML (ref 30–100)
ALBUMIN SERPL BCP-MCNC: 3.8 G/DL (ref 3.5–5)
ALP SERPL-CCNC: 72 U/L (ref 46–116)
ALT SERPL W P-5'-P-CCNC: 24 U/L (ref 12–78)
ANION GAP SERPL CALCULATED.3IONS-SCNC: 5 MMOL/L (ref 4–13)
AST SERPL W P-5'-P-CCNC: 19 U/L (ref 5–45)
BASOPHILS # BLD AUTO: 0.02 THOUSANDS/ΜL (ref 0–0.1)
BASOPHILS NFR BLD AUTO: 1 % (ref 0–1)
BILIRUB SERPL-MCNC: 0.45 MG/DL (ref 0.2–1)
BUN SERPL-MCNC: 13 MG/DL (ref 5–25)
CALCIUM SERPL-MCNC: 9 MG/DL (ref 8.3–10.1)
CHLORIDE SERPL-SCNC: 107 MMOL/L (ref 100–108)
CO2 SERPL-SCNC: 27 MMOL/L (ref 21–32)
CREAT SERPL-MCNC: 0.76 MG/DL (ref 0.6–1.3)
EOSINOPHIL # BLD AUTO: 0.06 THOUSAND/ΜL (ref 0–0.61)
EOSINOPHIL NFR BLD AUTO: 2 % (ref 0–6)
ERYTHROCYTE [DISTWIDTH] IN BLOOD BY AUTOMATED COUNT: 13.9 % (ref 11.6–15.1)
FERRITIN SERPL-MCNC: 13 NG/ML (ref 8–388)
GFR SERPL CREATININE-BSD FRML MDRD: 111 ML/MIN/1.73SQ M
GLUCOSE P FAST SERPL-MCNC: 85 MG/DL (ref 65–99)
HCT VFR BLD AUTO: 44.6 % (ref 34.8–46.1)
HGB BLD-MCNC: 13.9 G/DL (ref 11.5–15.4)
IMM GRANULOCYTES # BLD AUTO: 0.01 THOUSAND/UL (ref 0–0.2)
IMM GRANULOCYTES NFR BLD AUTO: 0 % (ref 0–2)
IRON SATN MFR SERPL: 12 %
IRON SERPL-MCNC: 52 UG/DL (ref 50–170)
LYMPHOCYTES # BLD AUTO: 1.7 THOUSANDS/ΜL (ref 0.6–4.47)
LYMPHOCYTES NFR BLD AUTO: 46 % (ref 14–44)
MAGNESIUM SERPL-MCNC: 2.1 MG/DL (ref 1.6–2.6)
MCH RBC QN AUTO: 25.9 PG (ref 26.8–34.3)
MCHC RBC AUTO-ENTMCNC: 31.2 G/DL (ref 31.4–37.4)
MCV RBC AUTO: 83 FL (ref 82–98)
MONOCYTES # BLD AUTO: 0.24 THOUSAND/ΜL (ref 0.17–1.22)
MONOCYTES NFR BLD AUTO: 7 % (ref 4–12)
NEUTROPHILS # BLD AUTO: 1.6 THOUSANDS/ΜL (ref 1.85–7.62)
NEUTS SEG NFR BLD AUTO: 44 % (ref 43–75)
NRBC BLD AUTO-RTO: 0 /100 WBCS
PLATELET # BLD AUTO: 179 THOUSANDS/UL (ref 149–390)
PMV BLD AUTO: 11 FL (ref 8.9–12.7)
POTASSIUM SERPL-SCNC: 3.9 MMOL/L (ref 3.5–5.3)
PROT SERPL-MCNC: 7.9 G/DL (ref 6.4–8.2)
RBC # BLD AUTO: 5.36 MILLION/UL (ref 3.81–5.12)
SODIUM SERPL-SCNC: 139 MMOL/L (ref 136–145)
TIBC SERPL-MCNC: 430 UG/DL (ref 250–450)
TSH SERPL DL<=0.05 MIU/L-ACNC: 1.56 UIU/ML (ref 0.36–3.74)
VIT B12 SERPL-MCNC: 671 PG/ML (ref 100–900)
WBC # BLD AUTO: 3.63 THOUSAND/UL (ref 4.31–10.16)

## 2020-05-11 PROCEDURE — 84443 ASSAY THYROID STIM HORMONE: CPT

## 2020-05-11 PROCEDURE — 86038 ANTINUCLEAR ANTIBODIES: CPT

## 2020-05-11 PROCEDURE — 82728 ASSAY OF FERRITIN: CPT

## 2020-05-11 PROCEDURE — 80053 COMPREHEN METABOLIC PANEL: CPT

## 2020-05-11 PROCEDURE — 83540 ASSAY OF IRON: CPT

## 2020-05-11 PROCEDURE — 83550 IRON BINDING TEST: CPT

## 2020-05-11 PROCEDURE — 83735 ASSAY OF MAGNESIUM: CPT

## 2020-05-11 PROCEDURE — 82306 VITAMIN D 25 HYDROXY: CPT

## 2020-05-11 PROCEDURE — 36415 COLL VENOUS BLD VENIPUNCTURE: CPT

## 2020-05-11 PROCEDURE — 85025 COMPLETE CBC W/AUTO DIFF WBC: CPT

## 2020-05-11 PROCEDURE — 82607 VITAMIN B-12: CPT

## 2020-05-11 PROCEDURE — 86430 RHEUMATOID FACTOR TEST QUAL: CPT

## 2020-05-11 PROCEDURE — 86200 CCP ANTIBODY: CPT

## 2020-05-11 PROCEDURE — 86618 LYME DISEASE ANTIBODY: CPT

## 2020-05-12 ENCOUNTER — TELEPHONE (OUTPATIENT)
Dept: SURGICAL ONCOLOGY | Facility: CLINIC | Age: 45
End: 2020-05-12

## 2020-05-12 ENCOUNTER — TELEPHONE (OUTPATIENT)
Dept: FAMILY MEDICINE CLINIC | Facility: CLINIC | Age: 45
End: 2020-05-12

## 2020-05-12 DIAGNOSIS — D72.818 OTHER DECREASED WHITE BLOOD CELL (WBC) COUNT: Primary | ICD-10-CM

## 2020-05-12 LAB
B BURGDOR IGG+IGM SER-ACNC: <0.91 ISR (ref 0–0.9)
RHEUMATOID FACT SER QL LA: NEGATIVE

## 2020-05-13 LAB
CCP IGA+IGG SERPL IA-ACNC: 12 UNITS (ref 0–19)
RYE IGE QN: NEGATIVE

## 2020-05-18 ENCOUNTER — TELEPHONE (OUTPATIENT)
Dept: FAMILY MEDICINE CLINIC | Facility: CLINIC | Age: 45
End: 2020-05-18

## 2020-05-27 ENCOUNTER — TELEPHONE (OUTPATIENT)
Dept: HEMATOLOGY ONCOLOGY | Facility: CLINIC | Age: 45
End: 2020-05-27

## 2020-05-29 ENCOUNTER — TELEPHONE (OUTPATIENT)
Dept: HEMATOLOGY ONCOLOGY | Facility: CLINIC | Age: 45
End: 2020-05-29

## 2020-05-29 ENCOUNTER — CONSULT (OUTPATIENT)
Dept: HEMATOLOGY ONCOLOGY | Facility: CLINIC | Age: 45
End: 2020-05-29
Payer: COMMERCIAL

## 2020-05-29 VITALS
TEMPERATURE: 97.2 F | OXYGEN SATURATION: 98 % | HEIGHT: 60 IN | HEART RATE: 68 BPM | SYSTOLIC BLOOD PRESSURE: 150 MMHG | DIASTOLIC BLOOD PRESSURE: 88 MMHG | WEIGHT: 122 LBS | RESPIRATION RATE: 16 BRPM | BODY MASS INDEX: 23.95 KG/M2

## 2020-05-29 DIAGNOSIS — R63.4 UNINTENTIONAL WEIGHT LOSS: ICD-10-CM

## 2020-05-29 DIAGNOSIS — E61.1 HYPOFERREMIA: ICD-10-CM

## 2020-05-29 DIAGNOSIS — D72.818 OTHER DECREASED WHITE BLOOD CELL (WBC) COUNT: ICD-10-CM

## 2020-05-29 DIAGNOSIS — D70.8 OTHER NEUTROPENIA (HCC): Primary | ICD-10-CM

## 2020-05-29 DIAGNOSIS — M79.10 MUSCLE PAIN: ICD-10-CM

## 2020-05-29 PROBLEM — D70.9 NEUTROPENIA (HCC): Status: ACTIVE | Noted: 2020-05-29

## 2020-05-29 PROCEDURE — 99243 OFF/OP CNSLTJ NEW/EST LOW 30: CPT | Performed by: PHYSICIAN ASSISTANT

## 2020-05-29 RX ORDER — MELATONIN
1000 DAILY
COMMUNITY
End: 2021-09-10 | Stop reason: ALTCHOICE

## 2020-06-05 ENCOUNTER — TELEPHONE (OUTPATIENT)
Dept: HEMATOLOGY ONCOLOGY | Facility: CLINIC | Age: 45
End: 2020-06-05

## 2020-06-05 DIAGNOSIS — R63.4 UNINTENTIONAL WEIGHT LOSS: ICD-10-CM

## 2020-06-05 DIAGNOSIS — D70.8 OTHER NEUTROPENIA (HCC): ICD-10-CM

## 2020-06-05 DIAGNOSIS — E61.1 HYPOFERREMIA: ICD-10-CM

## 2020-06-05 DIAGNOSIS — D72.818 OTHER DECREASED WHITE BLOOD CELL (WBC) COUNT: Primary | ICD-10-CM

## 2020-06-05 DIAGNOSIS — M79.10 MUSCLE PAIN: ICD-10-CM

## 2020-06-11 ENCOUNTER — APPOINTMENT (OUTPATIENT)
Dept: LAB | Age: 45
End: 2020-06-11
Payer: COMMERCIAL

## 2020-06-11 ENCOUNTER — HOSPITAL ENCOUNTER (OUTPATIENT)
Dept: RADIOLOGY | Age: 45
Discharge: HOME | End: 2020-06-11
Payer: COMMERCIAL

## 2020-06-11 ENCOUNTER — TRANSCRIBE ORDERS (OUTPATIENT)
Dept: ADMINISTRATIVE | Age: 45
End: 2020-06-11

## 2020-06-11 DIAGNOSIS — D70.8 OTHER NEUTROPENIA (HCC): ICD-10-CM

## 2020-06-11 DIAGNOSIS — E61.1 HYPOFERREMIA: ICD-10-CM

## 2020-06-11 DIAGNOSIS — R63.4 UNINTENTIONAL WEIGHT LOSS: ICD-10-CM

## 2020-06-11 DIAGNOSIS — D72.818 OTHER DECREASED WHITE BLOOD CELL (WBC) COUNT: ICD-10-CM

## 2020-06-11 LAB
BASOPHILS # BLD AUTO: 0.02 THOUSANDS/ΜL (ref 0–0.1)
BASOPHILS NFR BLD AUTO: 1 % (ref 0–1)
CRP SERPL QL: <3 MG/L
EOSINOPHIL # BLD AUTO: 0.07 THOUSAND/ΜL (ref 0–0.61)
EOSINOPHIL NFR BLD AUTO: 2 % (ref 0–6)
ERYTHROCYTE [DISTWIDTH] IN BLOOD BY AUTOMATED COUNT: 14.3 % (ref 11.6–15.1)
FERRITIN SERPL-MCNC: 13 NG/ML (ref 8–388)
HCT VFR BLD AUTO: 42.6 % (ref 34.8–46.1)
HGB BLD-MCNC: 13.4 G/DL (ref 11.5–15.4)
IGA SERPL-MCNC: 152 MG/DL (ref 70–400)
IGG SERPL-MCNC: 1650 MG/DL (ref 700–1600)
IGM SERPL-MCNC: 80 MG/DL (ref 40–230)
IMM GRANULOCYTES # BLD AUTO: 0.01 THOUSAND/UL (ref 0–0.2)
IMM GRANULOCYTES NFR BLD AUTO: 0 % (ref 0–2)
IRON SATN MFR SERPL: 27 %
IRON SERPL-MCNC: 106 UG/DL (ref 50–170)
LDH SERPL-CCNC: 167 U/L (ref 81–234)
LYMPHOCYTES # BLD AUTO: 1.97 THOUSANDS/ΜL (ref 0.6–4.47)
LYMPHOCYTES NFR BLD AUTO: 55 % (ref 14–44)
MCH RBC QN AUTO: 25.9 PG (ref 26.8–34.3)
MCHC RBC AUTO-ENTMCNC: 31.5 G/DL (ref 31.4–37.4)
MCV RBC AUTO: 82 FL (ref 82–98)
MONOCYTES # BLD AUTO: 0.25 THOUSAND/ΜL (ref 0.17–1.22)
MONOCYTES NFR BLD AUTO: 7 % (ref 4–12)
NEUTROPHILS # BLD AUTO: 1.24 THOUSANDS/ΜL (ref 1.85–7.62)
NEUTS SEG NFR BLD AUTO: 35 % (ref 43–75)
NRBC BLD AUTO-RTO: 0 /100 WBCS
PLATELET # BLD AUTO: 188 THOUSANDS/UL (ref 149–390)
PMV BLD AUTO: 10.4 FL (ref 8.9–12.7)
RBC # BLD AUTO: 5.18 MILLION/UL (ref 3.81–5.12)
TIBC SERPL-MCNC: 388 UG/DL (ref 250–450)
WBC # BLD AUTO: 3.56 THOUSAND/UL (ref 4.31–10.16)

## 2020-06-11 PROCEDURE — 82728 ASSAY OF FERRITIN: CPT

## 2020-06-11 PROCEDURE — 85025 COMPLETE CBC W/AUTO DIFF WBC: CPT

## 2020-06-11 PROCEDURE — 88185 FLOWCYTOMETRY/TC ADD-ON: CPT

## 2020-06-11 PROCEDURE — 86038 ANTINUCLEAR ANTIBODIES: CPT

## 2020-06-11 PROCEDURE — 76700 US EXAM ABDOM COMPLETE: CPT

## 2020-06-11 PROCEDURE — 36415 COLL VENOUS BLD VENIPUNCTURE: CPT

## 2020-06-11 PROCEDURE — 83550 IRON BINDING TEST: CPT

## 2020-06-11 PROCEDURE — 86140 C-REACTIVE PROTEIN: CPT

## 2020-06-11 PROCEDURE — 83615 LACTATE (LD) (LDH) ENZYME: CPT

## 2020-06-11 PROCEDURE — 82784 ASSAY IGA/IGD/IGG/IGM EACH: CPT

## 2020-06-11 PROCEDURE — 83540 ASSAY OF IRON: CPT

## 2020-06-11 PROCEDURE — 88184 FLOWCYTOMETRY/ TC 1 MARKER: CPT

## 2020-06-12 LAB
RYE IGE QN: NEGATIVE
SCAN RESULT: NORMAL

## 2020-06-25 PROCEDURE — U0003 INFECTIOUS AGENT DETECTION BY NUCLEIC ACID (DNA OR RNA); SEVERE ACUTE RESPIRATORY SYNDROME CORONAVIRUS 2 (SARS-COV-2) (CORONAVIRUS DISEASE [COVID-19]), AMPLIFIED PROBE TECHNIQUE, MAKING USE OF HIGH THROUGHPUT TECHNOLOGIES AS DESCRIBED BY CMS-2020-01-R: HCPCS | Performed by: INTERNAL MEDICINE

## 2020-06-26 ENCOUNTER — LAB REQUISITION (OUTPATIENT)
Dept: LAB | Facility: HOSPITAL | Age: 45
End: 2020-06-26
Payer: COMMERCIAL

## 2020-06-26 DIAGNOSIS — Z11.59 ENCOUNTER FOR SCREENING FOR OTHER VIRAL DISEASES: ICD-10-CM

## 2020-06-26 DIAGNOSIS — Z03.818 ENCOUNTER FOR OBSERVATION FOR SUSPECTED EXPOSURE TO OTHER BIOLOGICAL AGENTS RULED OUT: ICD-10-CM

## 2020-06-26 LAB — SARS-COV-2 N GENE RESP QL NAA+PROBE: NEGATIVE

## 2020-06-29 ENCOUNTER — TELEPHONE (OUTPATIENT)
Dept: HEMATOLOGY ONCOLOGY | Facility: MEDICAL CENTER | Age: 45
End: 2020-06-29

## 2020-06-29 DIAGNOSIS — I10 BENIGN ESSENTIAL HTN: ICD-10-CM

## 2020-06-29 RX ORDER — LISINOPRIL 10 MG/1
10 TABLET ORAL DAILY
Qty: 30 TABLET | Refills: 5 | Status: SHIPPED | OUTPATIENT
Start: 2020-06-29 | End: 2020-09-11

## 2020-06-30 ENCOUNTER — OFFICE VISIT (OUTPATIENT)
Dept: HEMATOLOGY ONCOLOGY | Facility: CLINIC | Age: 45
End: 2020-06-30
Payer: COMMERCIAL

## 2020-06-30 VITALS
WEIGHT: 120.8 LBS | BODY MASS INDEX: 23.71 KG/M2 | SYSTOLIC BLOOD PRESSURE: 145 MMHG | DIASTOLIC BLOOD PRESSURE: 85 MMHG | RESPIRATION RATE: 17 BRPM | OXYGEN SATURATION: 99 % | HEIGHT: 60 IN | TEMPERATURE: 97 F | HEART RATE: 84 BPM

## 2020-06-30 DIAGNOSIS — D70.8 OTHER NEUTROPENIA (HCC): Primary | ICD-10-CM

## 2020-06-30 PROCEDURE — 3008F BODY MASS INDEX DOCD: CPT | Performed by: INTERNAL MEDICINE

## 2020-06-30 PROCEDURE — 3079F DIAST BP 80-89 MM HG: CPT | Performed by: INTERNAL MEDICINE

## 2020-06-30 PROCEDURE — 1036F TOBACCO NON-USER: CPT | Performed by: INTERNAL MEDICINE

## 2020-06-30 PROCEDURE — 99213 OFFICE O/P EST LOW 20 MIN: CPT | Performed by: INTERNAL MEDICINE

## 2020-06-30 PROCEDURE — 3077F SYST BP >= 140 MM HG: CPT | Performed by: INTERNAL MEDICINE

## 2020-08-17 ENCOUNTER — OFFICE VISIT (OUTPATIENT)
Dept: FAMILY MEDICINE CLINIC | Facility: CLINIC | Age: 45
End: 2020-08-17
Payer: COMMERCIAL

## 2020-08-17 VITALS
BODY MASS INDEX: 23.16 KG/M2 | HEART RATE: 78 BPM | TEMPERATURE: 96.5 F | DIASTOLIC BLOOD PRESSURE: 60 MMHG | HEIGHT: 60 IN | WEIGHT: 118 LBS | OXYGEN SATURATION: 98 % | RESPIRATION RATE: 16 BRPM | SYSTOLIC BLOOD PRESSURE: 106 MMHG

## 2020-08-17 DIAGNOSIS — D70.8 OTHER NEUTROPENIA (HCC): Primary | ICD-10-CM

## 2020-08-17 DIAGNOSIS — R62.7 FAILURE TO THRIVE IN ADULT: ICD-10-CM

## 2020-08-17 DIAGNOSIS — S46.812A STRAIN OF LEFT TRAPEZIUS MUSCLE, INITIAL ENCOUNTER: ICD-10-CM

## 2020-08-17 DIAGNOSIS — R10.2 PELVIC PAIN: ICD-10-CM

## 2020-08-17 DIAGNOSIS — R63.4 UNINTENTIONAL WEIGHT LOSS: ICD-10-CM

## 2020-08-17 DIAGNOSIS — E61.1 HYPOFERREMIA: ICD-10-CM

## 2020-08-17 PROCEDURE — 99214 OFFICE O/P EST MOD 30 MIN: CPT | Performed by: FAMILY MEDICINE

## 2020-08-17 PROCEDURE — 3008F BODY MASS INDEX DOCD: CPT | Performed by: FAMILY MEDICINE

## 2020-08-17 PROCEDURE — 1036F TOBACCO NON-USER: CPT | Performed by: FAMILY MEDICINE

## 2020-08-17 PROCEDURE — 3074F SYST BP LT 130 MM HG: CPT | Performed by: FAMILY MEDICINE

## 2020-08-17 PROCEDURE — 3078F DIAST BP <80 MM HG: CPT | Performed by: FAMILY MEDICINE

## 2020-08-17 RX ORDER — NAPROXEN 500 MG/1
500 TABLET ORAL 2 TIMES DAILY WITH MEALS
Qty: 14 TABLET | Refills: 0 | Status: SHIPPED | OUTPATIENT
Start: 2020-08-17 | End: 2021-09-10 | Stop reason: ALTCHOICE

## 2020-08-17 NOTE — PROGRESS NOTES
FAMILY MEDICINE PROGRESS NOTE  Robert Serna 41 y o  female   DATE: August 17, 2020     ASSESSMENT and PLAN:  Robert Serna is a 41 y o  female with:     Problem List Items Addressed This Visit        Other    Neutropenia ([address] ) - Primary    Relevant Orders    CT chest abdomen pelvis w contrast    Hypoferremia    Relevant Orders    CT chest abdomen pelvis w contrast      Other Visit Diagnoses     Failure to thrive in adult        Relevant Orders    CT chest abdomen pelvis w contrast    Unintentional weight loss        Relevant Orders    CT chest abdomen pelvis w contrast    Pelvic pain        Relevant Orders    CT chest abdomen pelvis w contrast    Strain of left trapezius muscle, initial encounter        Relevant Medications    naproxen (NAPROSYN) 500 mg tablet        1  Neck pain-likely secondary to trapezius muscle strain, may also have comorbid muscle spasms as well  Advise treatment would be stretching, exercise, heat, NSAIDs  Provided prescription for naproxen, patient would like to avoid any other medications at this time  If persists, can consider muscle relaxer at that time  2  Unintentional weight loss/failure to thrive-patient is increasingly concerned about her weight loss, recent lab work has been unrevealing, did have neutropenia which has been further worked up by Hematology and thought to be related to her ethnic background  Patient does report some pelvic pain as well, will check chest abdomen pelvis CT to rule out underlying malignancy    Patient agreeable with the plan and expressed understanding  I discucssed signs and symptoms for which to RTC, go to ER or seek urgent medical care  SUBJECTIVE:  Robert Serna is a 41 y o  female who presents today with a chief complaint of Follow-up  Pt is here with 2 issues:  1  For the past 3 days patient has been having neck pain, she works at a nursing home, but denies any inciting event or injury    She states it is painful to move her head left to right   She likes to avoid medications so she has not tried anything for the pain yet  Patient states normally she does not sleep on a pillow and has not used any new pillows or mattresses either  2  Patient had been seen by hematology twice for workup leukopenia, workup has been largely negative, she has persistent leukopenia that is thought to be due to her ethnic origin given no other findings  Patient is intermittently compliant with taking iron supplements  She has been having gradual weight loss over the last year  Patient has lost 14 lb while maintaining a healthy, adequate diet without any increase in activity or decrease in her calories  Patient denies any systemic symptoms of fevers, chills, states that she does have pelvic pain occasionally at the site of her  scar in her lower pelvis, her last  was in   Was recommended that she have a CT chest abdomen pelvis by Hematology/Oncology, but it was denied by her insurance and did recently have a normal abdominal ultrasound  Wt Readings from Last 10 Encounters:  20 : 53 5 kg (118 lb)  20 : 54 8 kg (120 lb 12 8 oz)  20 : 55 3 kg (122 lb)  20 : 54 4 kg (120 lb)  20 : 56 2 kg (124 lb)  19 : 58 1 kg (128 lb)  19 : 57 4 kg (126 lb 8 oz)  19 : 59 4 kg (131 lb)  19 : 59 9 kg (132 lb)  19 : 59 9 kg (132 lb)      Review of Systems   Constitutional: Positive for unexpected weight change  Negative for chills, fatigue and fever  Respiratory: Negative for cough and shortness of breath  Gastrointestinal: Negative for abdominal pain  Genitourinary: Positive for pelvic pain  Musculoskeletal: Positive for myalgias and neck pain  Hematological: Does not bruise/bleed easily  I have reviewed the patient's Past Medical History      OBJECTIVE:  /60   Pulse 78   Temp (!) 96 5 °F (35 8 °C)   Resp 16   Ht 5' (1 524 m)   Wt 53 5 kg (118 lb)   LMP 2020   SpO2 98% BMI 23 05 kg/m²    Physical Exam  Vitals signs reviewed  Constitutional:       General: She is not in acute distress  Appearance: She is well-developed  She is not diaphoretic  HENT:      Head: Normocephalic and atraumatic  Neck:      Musculoskeletal: Normal range of motion  Pain with movement, spinous process tenderness and muscular tenderness present  No edema, erythema, neck rigidity, crepitus, injury or torticollis  Cardiovascular:      Rate and Rhythm: Normal rate and regular rhythm  Heart sounds: Normal heart sounds  No murmur  Pulmonary:      Effort: Pulmonary effort is normal  No respiratory distress  Breath sounds: Normal breath sounds  No wheezing  Abdominal:      General: Bowel sounds are normal  There is no distension  Palpations: Abdomen is soft  Tenderness: There is no abdominal tenderness  Neurological:      Mental Status: She is alert and oriented to person, place, and time  Ibis Oliva MD    Note: Portions of the record have been created with voice recognition software  Occasional wrong word or "sound a like" substitutions may have occurred due to the inherent limitations of voice recognition software  Read the chart carefully and recognize, using context, where substitutions have occurred

## 2020-08-18 ENCOUNTER — TELEPHONE (OUTPATIENT)
Dept: FAMILY MEDICINE CLINIC | Facility: CLINIC | Age: 45
End: 2020-08-18

## 2020-08-18 DIAGNOSIS — I10 BENIGN ESSENTIAL HTN: Primary | ICD-10-CM

## 2020-08-18 NOTE — TELEPHONE ENCOUNTER
----- Message from Evan Alonso sent at 8/18/2020 11:41 AM EDT -----  Regarding: ct w/ contrast, Labs Required  Gypsy Griffith is scheduled for her ordered ct chest abd pelvis w/ iv contrast on 8/24/20  Due to her hx of high blood pressure we require labs within 90 days of iv contrast administration  Her most recent labs are out of our 90 day range so we will need an order placed for  for BUN and Creatinine or a lab order containing so the patient can have them done atleast one day prior to exam date  Thank You!

## 2020-08-18 NOTE — TELEPHONE ENCOUNTER
I've placed an order for the BMP, could you please notify the patient of the requirement as well, thanks!

## 2020-08-20 ENCOUNTER — APPOINTMENT (OUTPATIENT)
Dept: LAB | Age: 45
End: 2020-08-20
Payer: COMMERCIAL

## 2020-08-20 ENCOUNTER — TRANSCRIBE ORDERS (OUTPATIENT)
Dept: ADMINISTRATIVE | Age: 45
End: 2020-08-20

## 2020-08-20 DIAGNOSIS — I10 BENIGN ESSENTIAL HTN: ICD-10-CM

## 2020-08-20 LAB
ANION GAP SERPL CALCULATED.3IONS-SCNC: 4 MMOL/L (ref 4–13)
BUN SERPL-MCNC: 10 MG/DL (ref 5–25)
CALCIUM SERPL-MCNC: 8.5 MG/DL (ref 8.3–10.1)
CHLORIDE SERPL-SCNC: 108 MMOL/L (ref 100–108)
CO2 SERPL-SCNC: 30 MMOL/L (ref 21–32)
CREAT SERPL-MCNC: 0.68 MG/DL (ref 0.6–1.3)
GFR SERPL CREATININE-BSD FRML MDRD: 123 ML/MIN/1.73SQ M
GLUCOSE P FAST SERPL-MCNC: 79 MG/DL (ref 65–99)
POTASSIUM SERPL-SCNC: 3.5 MMOL/L (ref 3.5–5.3)
SODIUM SERPL-SCNC: 142 MMOL/L (ref 136–145)

## 2020-08-20 PROCEDURE — 80048 BASIC METABOLIC PNL TOTAL CA: CPT

## 2020-08-20 PROCEDURE — 36415 COLL VENOUS BLD VENIPUNCTURE: CPT

## 2020-08-24 ENCOUNTER — TELEPHONE (OUTPATIENT)
Dept: FAMILY MEDICINE CLINIC | Facility: CLINIC | Age: 45
End: 2020-08-24

## 2020-08-24 ENCOUNTER — CLINICAL SUPPORT (OUTPATIENT)
Dept: FAMILY MEDICINE CLINIC | Facility: CLINIC | Age: 45
End: 2020-08-24
Payer: COMMERCIAL

## 2020-08-24 VITALS
BODY MASS INDEX: 23.75 KG/M2 | RESPIRATION RATE: 16 BRPM | DIASTOLIC BLOOD PRESSURE: 100 MMHG | SYSTOLIC BLOOD PRESSURE: 140 MMHG | OXYGEN SATURATION: 98 % | HEIGHT: 60 IN | HEART RATE: 60 BPM | TEMPERATURE: 96.1 F | WEIGHT: 121 LBS

## 2020-08-24 DIAGNOSIS — I15.2 HYPERTENSION DUE TO ENDOCRINE DISORDER: Primary | ICD-10-CM

## 2020-08-24 DIAGNOSIS — M62.838 MUSCLE SPASM: Primary | ICD-10-CM

## 2020-08-24 PROCEDURE — 1036F TOBACCO NON-USER: CPT

## 2020-08-24 PROCEDURE — 3008F BODY MASS INDEX DOCD: CPT

## 2020-08-24 PROCEDURE — 99211 OFF/OP EST MAY X REQ PHY/QHP: CPT

## 2020-08-24 PROCEDURE — 3077F SYST BP >= 140 MM HG: CPT

## 2020-08-24 PROCEDURE — 3080F DIAST BP >= 90 MM HG: CPT

## 2020-08-24 RX ORDER — CYCLOBENZAPRINE HCL 5 MG
5 TABLET ORAL 3 TIMES DAILY PRN
Qty: 30 TABLET | Refills: 0 | Status: SHIPPED | OUTPATIENT
Start: 2020-08-24 | End: 2021-09-10 | Stop reason: ALTCHOICE

## 2020-08-24 NOTE — TELEPHONE ENCOUNTER
Patient in the office for a nurse visit blood pressure check, /100, she stated she took Lisinopril today at 7:am and is 9:45 am, she said she still having neck and arms pain, the CT scan is not approved by insurance yet, she is waiting for us to let her know when is approved  She took Naproxen at 7am and rated her pain level at 7 on a scale from 0 to 10

## 2020-08-24 NOTE — TELEPHONE ENCOUNTER
Call patient, continue current dose of lisinopril for now, BP may be elevated due to pain and naproxen  Would recommend adding muscle relaxer, she didn't want to try before, but I would recommend it to help the pain      Not sure if received prior auth request for CT?

## 2020-08-26 ENCOUNTER — TELEPHONE (OUTPATIENT)
Dept: FAMILY MEDICINE CLINIC | Facility: CLINIC | Age: 45
End: 2020-08-26

## 2020-08-26 NOTE — TELEPHONE ENCOUNTER
CT of Abdomen and Pelvis approved through [facility] #[id number]  Valid 08/20/2020-12/18/2020  Also approved through [facility]# [id number] 08/21/2020-10/24/2020

## 2020-09-03 ENCOUNTER — HOSPITAL ENCOUNTER (OUTPATIENT)
Dept: RADIOLOGY | Age: 45
Discharge: HOME | End: 2020-09-03
Payer: COMMERCIAL

## 2020-09-03 DIAGNOSIS — R10.2 PELVIC PAIN: ICD-10-CM

## 2020-09-03 DIAGNOSIS — R62.7 FAILURE TO THRIVE IN ADULT: ICD-10-CM

## 2020-09-03 DIAGNOSIS — E61.1 HYPOFERREMIA: ICD-10-CM

## 2020-09-03 DIAGNOSIS — D70.8 OTHER NEUTROPENIA (HCC): ICD-10-CM

## 2020-09-03 DIAGNOSIS — R63.4 UNINTENTIONAL WEIGHT LOSS: ICD-10-CM

## 2020-09-03 PROCEDURE — 71260 CT THORAX DX C+: CPT

## 2020-09-03 PROCEDURE — G1004 CDSM NDSC: HCPCS

## 2020-09-03 PROCEDURE — 74177 CT ABD & PELVIS W/CONTRAST: CPT

## 2020-09-03 RX ADMIN — IOHEXOL 100 ML: 350 INJECTION, SOLUTION INTRAVENOUS at 08:41

## 2020-09-11 DIAGNOSIS — I10 BENIGN ESSENTIAL HTN: ICD-10-CM

## 2020-09-11 RX ORDER — LISINOPRIL 10 MG/1
TABLET ORAL
Qty: 30 TABLET | Refills: 5 | Status: SHIPPED | OUTPATIENT
Start: 2020-09-11 | End: 2021-09-10

## 2020-09-24 ENCOUNTER — APPOINTMENT (OUTPATIENT)
Dept: LAB | Facility: MEDICAL CENTER | Age: 45
End: 2020-09-24

## 2020-09-24 ENCOUNTER — APPOINTMENT (OUTPATIENT)
Dept: URGENT CARE | Facility: MEDICAL CENTER | Age: 45
End: 2020-09-24

## 2020-09-24 ENCOUNTER — TRANSCRIBE ORDERS (OUTPATIENT)
Dept: URGENT CARE | Facility: MEDICAL CENTER | Age: 45
End: 2020-09-24

## 2020-09-24 DIAGNOSIS — Z02.1 PRE-EMPLOYMENT HEALTH SCREENING EXAMINATION: Primary | ICD-10-CM

## 2020-09-24 DIAGNOSIS — Z02.1 PRE-EMPLOYMENT HEALTH SCREENING EXAMINATION: ICD-10-CM

## 2020-09-24 PROCEDURE — 36415 COLL VENOUS BLD VENIPUNCTURE: CPT

## 2020-09-24 PROCEDURE — 86480 TB TEST CELL IMMUN MEASURE: CPT

## 2020-09-25 LAB
GAMMA INTERFERON BACKGROUND BLD IA-ACNC: 0.01 IU/ML
M TB IFN-G BLD-IMP: NEGATIVE
M TB IFN-G CD4+ BCKGRND COR BLD-ACNC: 0 IU/ML
M TB IFN-G CD4+ BCKGRND COR BLD-ACNC: 0 IU/ML
MITOGEN IGNF BCKGRD COR BLD-ACNC: >10 IU/ML

## 2020-09-29 ENCOUNTER — LAB REQUISITION (OUTPATIENT)
Dept: LAB | Facility: HOSPITAL | Age: 45
End: 2020-09-29
Payer: COMMERCIAL

## 2020-09-29 DIAGNOSIS — U07.1 COVID-19: ICD-10-CM

## 2020-09-29 PROCEDURE — U0003 INFECTIOUS AGENT DETECTION BY NUCLEIC ACID (DNA OR RNA); SEVERE ACUTE RESPIRATORY SYNDROME CORONAVIRUS 2 (SARS-COV-2) (CORONAVIRUS DISEASE [COVID-19]), AMPLIFIED PROBE TECHNIQUE, MAKING USE OF HIGH THROUGHPUT TECHNOLOGIES AS DESCRIBED BY CMS-2020-01-R: HCPCS | Performed by: INTERNAL MEDICINE

## 2020-10-01 LAB — SARS-COV-2 N GENE RESP QL NAA+PROBE: NEGATIVE

## 2020-10-12 PROCEDURE — U0003 INFECTIOUS AGENT DETECTION BY NUCLEIC ACID (DNA OR RNA); SEVERE ACUTE RESPIRATORY SYNDROME CORONAVIRUS 2 (SARS-COV-2) (CORONAVIRUS DISEASE [COVID-19]), AMPLIFIED PROBE TECHNIQUE, MAKING USE OF HIGH THROUGHPUT TECHNOLOGIES AS DESCRIBED BY CMS-2020-01-R: HCPCS | Performed by: INTERNAL MEDICINE

## 2020-10-13 ENCOUNTER — LAB REQUISITION (OUTPATIENT)
Dept: LAB | Facility: HOSPITAL | Age: 45
End: 2020-10-13
Payer: COMMERCIAL

## 2020-10-13 DIAGNOSIS — U07.1 COVID-19: ICD-10-CM

## 2020-10-13 LAB — SARS-COV-2 RNA RESP QL NAA+PROBE: NEGATIVE

## 2020-11-04 ENCOUNTER — LAB REQUISITION (OUTPATIENT)
Dept: LAB | Facility: HOSPITAL | Age: 45
End: 2020-11-04
Payer: COMMERCIAL

## 2020-11-04 DIAGNOSIS — Z11.59 ENCOUNTER FOR SCREENING FOR OTHER VIRAL DISEASES: ICD-10-CM

## 2020-11-04 LAB — HBA1C MFR BLD HPLC: 5.6 %

## 2020-11-04 PROCEDURE — U0003 INFECTIOUS AGENT DETECTION BY NUCLEIC ACID (DNA OR RNA); SEVERE ACUTE RESPIRATORY SYNDROME CORONAVIRUS 2 (SARS-COV-2) (CORONAVIRUS DISEASE [COVID-19]), AMPLIFIED PROBE TECHNIQUE, MAKING USE OF HIGH THROUGHPUT TECHNOLOGIES AS DESCRIBED BY CMS-2020-01-R: HCPCS | Performed by: INTERNAL MEDICINE

## 2020-11-06 LAB — SARS-COV-2 RNA SPEC QL NAA+PROBE: NOT DETECTED

## 2020-11-17 ENCOUNTER — LAB REQUISITION (OUTPATIENT)
Dept: LAB | Facility: HOSPITAL | Age: 45
End: 2020-11-17
Payer: COMMERCIAL

## 2020-11-17 DIAGNOSIS — U07.1 COVID-19: ICD-10-CM

## 2020-11-17 PROCEDURE — U0003 INFECTIOUS AGENT DETECTION BY NUCLEIC ACID (DNA OR RNA); SEVERE ACUTE RESPIRATORY SYNDROME CORONAVIRUS 2 (SARS-COV-2) (CORONAVIRUS DISEASE [COVID-19]), AMPLIFIED PROBE TECHNIQUE, MAKING USE OF HIGH THROUGHPUT TECHNOLOGIES AS DESCRIBED BY CMS-2020-01-R: HCPCS | Performed by: INTERNAL MEDICINE

## 2020-11-19 LAB — SARS-COV-2 RNA SPEC QL NAA+PROBE: NOT DETECTED

## 2020-11-24 ENCOUNTER — TELEPHONE (OUTPATIENT)
Dept: FAMILY MEDICINE CLINIC | Facility: CLINIC | Age: 45
End: 2020-11-24

## 2020-11-24 DIAGNOSIS — Z20.822 EXPOSURE TO COVID-19 VIRUS: Primary | ICD-10-CM

## 2020-12-03 ENCOUNTER — LAB REQUISITION (OUTPATIENT)
Dept: LAB | Facility: HOSPITAL | Age: 45
End: 2020-12-03
Payer: COMMERCIAL

## 2020-12-03 DIAGNOSIS — Z11.59 ENCOUNTER FOR SCREENING FOR OTHER VIRAL DISEASES: ICD-10-CM

## 2020-12-03 PROCEDURE — U0003 INFECTIOUS AGENT DETECTION BY NUCLEIC ACID (DNA OR RNA); SEVERE ACUTE RESPIRATORY SYNDROME CORONAVIRUS 2 (SARS-COV-2) (CORONAVIRUS DISEASE [COVID-19]), AMPLIFIED PROBE TECHNIQUE, MAKING USE OF HIGH THROUGHPUT TECHNOLOGIES AS DESCRIBED BY CMS-2020-01-R: HCPCS | Performed by: INTERNAL MEDICINE

## 2020-12-04 LAB — SARS-COV-2 RNA SPEC QL NAA+PROBE: NOT DETECTED

## 2020-12-07 ENCOUNTER — LAB REQUISITION (OUTPATIENT)
Dept: LAB | Facility: HOSPITAL | Age: 45
End: 2020-12-07
Payer: COMMERCIAL

## 2020-12-07 DIAGNOSIS — U07.1 COVID-19: ICD-10-CM

## 2020-12-07 PROCEDURE — U0003 INFECTIOUS AGENT DETECTION BY NUCLEIC ACID (DNA OR RNA); SEVERE ACUTE RESPIRATORY SYNDROME CORONAVIRUS 2 (SARS-COV-2) (CORONAVIRUS DISEASE [COVID-19]), AMPLIFIED PROBE TECHNIQUE, MAKING USE OF HIGH THROUGHPUT TECHNOLOGIES AS DESCRIBED BY CMS-2020-01-R: HCPCS | Performed by: INTERNAL MEDICINE

## 2020-12-09 ENCOUNTER — LAB REQUISITION (OUTPATIENT)
Dept: LAB | Facility: HOSPITAL | Age: 45
End: 2020-12-09
Payer: COMMERCIAL

## 2020-12-09 DIAGNOSIS — Z11.59 ENCOUNTER FOR SCREENING FOR OTHER VIRAL DISEASES: ICD-10-CM

## 2020-12-09 LAB — SARS-COV-2 RNA SPEC QL NAA+PROBE: NOT DETECTED

## 2020-12-09 PROCEDURE — U0003 INFECTIOUS AGENT DETECTION BY NUCLEIC ACID (DNA OR RNA); SEVERE ACUTE RESPIRATORY SYNDROME CORONAVIRUS 2 (SARS-COV-2) (CORONAVIRUS DISEASE [COVID-19]), AMPLIFIED PROBE TECHNIQUE, MAKING USE OF HIGH THROUGHPUT TECHNOLOGIES AS DESCRIBED BY CMS-2020-01-R: HCPCS | Performed by: INTERNAL MEDICINE

## 2020-12-11 LAB — SARS-COV-2 RNA SPEC QL NAA+PROBE: NOT DETECTED

## 2020-12-14 PROCEDURE — U0003 INFECTIOUS AGENT DETECTION BY NUCLEIC ACID (DNA OR RNA); SEVERE ACUTE RESPIRATORY SYNDROME CORONAVIRUS 2 (SARS-COV-2) (CORONAVIRUS DISEASE [COVID-19]), AMPLIFIED PROBE TECHNIQUE, MAKING USE OF HIGH THROUGHPUT TECHNOLOGIES AS DESCRIBED BY CMS-2020-01-R: HCPCS | Performed by: INTERNAL MEDICINE

## 2020-12-15 ENCOUNTER — LAB REQUISITION (OUTPATIENT)
Dept: LAB | Facility: HOSPITAL | Age: 45
End: 2020-12-15
Payer: COMMERCIAL

## 2020-12-15 DIAGNOSIS — Z11.59 ENCOUNTER FOR SCREENING FOR OTHER VIRAL DISEASES: ICD-10-CM

## 2020-12-16 LAB — SARS-COV-2 RNA SPEC QL NAA+PROBE: NOT DETECTED

## 2020-12-21 PROCEDURE — U0003 INFECTIOUS AGENT DETECTION BY NUCLEIC ACID (DNA OR RNA); SEVERE ACUTE RESPIRATORY SYNDROME CORONAVIRUS 2 (SARS-COV-2) (CORONAVIRUS DISEASE [COVID-19]), AMPLIFIED PROBE TECHNIQUE, MAKING USE OF HIGH THROUGHPUT TECHNOLOGIES AS DESCRIBED BY CMS-2020-01-R: HCPCS | Performed by: INTERNAL MEDICINE

## 2020-12-22 ENCOUNTER — LAB REQUISITION (OUTPATIENT)
Dept: LAB | Facility: HOSPITAL | Age: 45
End: 2020-12-22
Payer: COMMERCIAL

## 2020-12-22 DIAGNOSIS — Z11.59 ENCOUNTER FOR SCREENING FOR OTHER VIRAL DISEASES: ICD-10-CM

## 2020-12-22 LAB — SARS-COV-2 N GENE RESP QL NAA+PROBE: NEGATIVE

## 2020-12-28 ENCOUNTER — LAB REQUISITION (OUTPATIENT)
Dept: LAB | Facility: HOSPITAL | Age: 45
End: 2020-12-28
Payer: COMMERCIAL

## 2020-12-28 DIAGNOSIS — Z11.59 ENCOUNTER FOR SCREENING FOR OTHER VIRAL DISEASES: ICD-10-CM

## 2020-12-28 PROCEDURE — U0003 INFECTIOUS AGENT DETECTION BY NUCLEIC ACID (DNA OR RNA); SEVERE ACUTE RESPIRATORY SYNDROME CORONAVIRUS 2 (SARS-COV-2) (CORONAVIRUS DISEASE [COVID-19]), AMPLIFIED PROBE TECHNIQUE, MAKING USE OF HIGH THROUGHPUT TECHNOLOGIES AS DESCRIBED BY CMS-2020-01-R: HCPCS | Performed by: INTERNAL MEDICINE

## 2020-12-30 LAB — SARS-COV-2 RNA SPEC QL NAA+PROBE: NOT DETECTED

## 2021-01-04 ENCOUNTER — LAB REQUISITION (OUTPATIENT)
Dept: LAB | Facility: HOSPITAL | Age: 46
End: 2021-01-04
Payer: COMMERCIAL

## 2021-01-04 DIAGNOSIS — Z11.59 ENCOUNTER FOR SCREENING FOR OTHER VIRAL DISEASES: ICD-10-CM

## 2021-01-04 PROCEDURE — U0003 INFECTIOUS AGENT DETECTION BY NUCLEIC ACID (DNA OR RNA); SEVERE ACUTE RESPIRATORY SYNDROME CORONAVIRUS 2 (SARS-COV-2) (CORONAVIRUS DISEASE [COVID-19]), AMPLIFIED PROBE TECHNIQUE, MAKING USE OF HIGH THROUGHPUT TECHNOLOGIES AS DESCRIBED BY CMS-2020-01-R: HCPCS | Performed by: INTERNAL MEDICINE

## 2021-01-06 LAB — SARS-COV-2 RNA SPEC QL NAA+PROBE: NOT DETECTED

## 2021-01-11 ENCOUNTER — LAB REQUISITION (OUTPATIENT)
Dept: LAB | Facility: HOSPITAL | Age: 46
End: 2021-01-11
Payer: COMMERCIAL

## 2021-01-11 DIAGNOSIS — Z11.59 ENCOUNTER FOR SCREENING FOR OTHER VIRAL DISEASES: ICD-10-CM

## 2021-01-11 PROCEDURE — U0003 INFECTIOUS AGENT DETECTION BY NUCLEIC ACID (DNA OR RNA); SEVERE ACUTE RESPIRATORY SYNDROME CORONAVIRUS 2 (SARS-COV-2) (CORONAVIRUS DISEASE [COVID-19]), AMPLIFIED PROBE TECHNIQUE, MAKING USE OF HIGH THROUGHPUT TECHNOLOGIES AS DESCRIBED BY CMS-2020-01-R: HCPCS | Performed by: INTERNAL MEDICINE

## 2021-01-11 PROCEDURE — U0005 INFEC AGEN DETEC AMPLI PROBE: HCPCS | Performed by: INTERNAL MEDICINE

## 2021-01-12 DIAGNOSIS — E03.9 HYPOTHYROIDISM, UNSPECIFIED TYPE: Primary | ICD-10-CM

## 2021-01-12 LAB — SARS-COV-2 RNA SPEC QL NAA+PROBE: NOT DETECTED

## 2021-01-18 ENCOUNTER — LAB REQUISITION (OUTPATIENT)
Dept: LAB | Facility: HOSPITAL | Age: 46
End: 2021-01-18
Payer: COMMERCIAL

## 2021-01-18 DIAGNOSIS — Z11.59 ENCOUNTER FOR SCREENING FOR OTHER VIRAL DISEASES: ICD-10-CM

## 2021-01-18 PROCEDURE — U0003 INFECTIOUS AGENT DETECTION BY NUCLEIC ACID (DNA OR RNA); SEVERE ACUTE RESPIRATORY SYNDROME CORONAVIRUS 2 (SARS-COV-2) (CORONAVIRUS DISEASE [COVID-19]), AMPLIFIED PROBE TECHNIQUE, MAKING USE OF HIGH THROUGHPUT TECHNOLOGIES AS DESCRIBED BY CMS-2020-01-R: HCPCS | Performed by: INTERNAL MEDICINE

## 2021-01-21 ENCOUNTER — LAB REQUISITION (OUTPATIENT)
Dept: LAB | Facility: HOSPITAL | Age: 46
End: 2021-01-21
Payer: COMMERCIAL

## 2021-01-21 DIAGNOSIS — Z11.59 ENCOUNTER FOR SCREENING FOR OTHER VIRAL DISEASES: ICD-10-CM

## 2021-01-21 LAB — SARS-COV-2 N GENE RESP QL NAA+PROBE: NEGATIVE

## 2021-01-21 PROCEDURE — U0003 INFECTIOUS AGENT DETECTION BY NUCLEIC ACID (DNA OR RNA); SEVERE ACUTE RESPIRATORY SYNDROME CORONAVIRUS 2 (SARS-COV-2) (CORONAVIRUS DISEASE [COVID-19]), AMPLIFIED PROBE TECHNIQUE, MAKING USE OF HIGH THROUGHPUT TECHNOLOGIES AS DESCRIBED BY CMS-2020-01-R: HCPCS | Performed by: INTERNAL MEDICINE

## 2021-01-21 PROCEDURE — U0005 INFEC AGEN DETEC AMPLI PROBE: HCPCS | Performed by: INTERNAL MEDICINE

## 2021-01-22 LAB — SARS-COV-2 RNA SPEC QL NAA+PROBE: NOT DETECTED

## 2021-01-25 ENCOUNTER — LAB REQUISITION (OUTPATIENT)
Dept: LAB | Facility: HOSPITAL | Age: 46
End: 2021-01-25
Payer: COMMERCIAL

## 2021-01-25 DIAGNOSIS — Z11.59 ENCOUNTER FOR SCREENING FOR OTHER VIRAL DISEASES: ICD-10-CM

## 2021-01-25 PROCEDURE — 87635 SARS-COV-2 COVID-19 AMP PRB: CPT | Performed by: INTERNAL MEDICINE

## 2021-01-26 LAB — SARS-COV-2 RNA RESP QL NAA+PROBE: NEGATIVE

## 2021-01-28 PROCEDURE — U0005 INFEC AGEN DETEC AMPLI PROBE: HCPCS | Performed by: INTERNAL MEDICINE

## 2021-01-28 PROCEDURE — U0003 INFECTIOUS AGENT DETECTION BY NUCLEIC ACID (DNA OR RNA); SEVERE ACUTE RESPIRATORY SYNDROME CORONAVIRUS 2 (SARS-COV-2) (CORONAVIRUS DISEASE [COVID-19]), AMPLIFIED PROBE TECHNIQUE, MAKING USE OF HIGH THROUGHPUT TECHNOLOGIES AS DESCRIBED BY CMS-2020-01-R: HCPCS | Performed by: INTERNAL MEDICINE

## 2021-01-29 ENCOUNTER — LAB REQUISITION (OUTPATIENT)
Dept: LAB | Facility: HOSPITAL | Age: 46
End: 2021-01-29
Payer: COMMERCIAL

## 2021-01-29 DIAGNOSIS — Z11.59 ENCOUNTER FOR SCREENING FOR OTHER VIRAL DISEASES: ICD-10-CM

## 2021-01-30 LAB — SARS-COV-2 N GENE RESP QL NAA+PROBE: NEGATIVE

## 2021-02-04 ENCOUNTER — LAB REQUISITION (OUTPATIENT)
Dept: LAB | Facility: HOSPITAL | Age: 46
End: 2021-02-04
Payer: COMMERCIAL

## 2021-02-04 DIAGNOSIS — Z11.59 ENCOUNTER FOR SCREENING FOR OTHER VIRAL DISEASES: ICD-10-CM

## 2021-02-04 PROCEDURE — U0003 INFECTIOUS AGENT DETECTION BY NUCLEIC ACID (DNA OR RNA); SEVERE ACUTE RESPIRATORY SYNDROME CORONAVIRUS 2 (SARS-COV-2) (CORONAVIRUS DISEASE [COVID-19]), AMPLIFIED PROBE TECHNIQUE, MAKING USE OF HIGH THROUGHPUT TECHNOLOGIES AS DESCRIBED BY CMS-2020-01-R: HCPCS | Performed by: INTERNAL MEDICINE

## 2021-02-04 PROCEDURE — U0005 INFEC AGEN DETEC AMPLI PROBE: HCPCS | Performed by: INTERNAL MEDICINE

## 2021-02-05 LAB — SARS-COV-2 RNA RESP QL NAA+PROBE: NEGATIVE

## 2021-02-10 ENCOUNTER — LAB REQUISITION (OUTPATIENT)
Dept: LAB | Facility: HOSPITAL | Age: 46
End: 2021-02-10
Payer: COMMERCIAL

## 2021-02-10 DIAGNOSIS — U07.1 COVID-19: ICD-10-CM

## 2021-02-10 PROCEDURE — U0005 INFEC AGEN DETEC AMPLI PROBE: HCPCS | Performed by: INTERNAL MEDICINE

## 2021-02-10 PROCEDURE — U0003 INFECTIOUS AGENT DETECTION BY NUCLEIC ACID (DNA OR RNA); SEVERE ACUTE RESPIRATORY SYNDROME CORONAVIRUS 2 (SARS-COV-2) (CORONAVIRUS DISEASE [COVID-19]), AMPLIFIED PROBE TECHNIQUE, MAKING USE OF HIGH THROUGHPUT TECHNOLOGIES AS DESCRIBED BY CMS-2020-01-R: HCPCS | Performed by: INTERNAL MEDICINE

## 2021-02-11 LAB — SARS-COV-2 RNA RESP QL NAA+PROBE: NEGATIVE

## 2021-02-15 ENCOUNTER — LAB REQUISITION (OUTPATIENT)
Dept: LAB | Facility: HOSPITAL | Age: 46
End: 2021-02-15
Payer: COMMERCIAL

## 2021-02-15 DIAGNOSIS — Z11.59 ENCOUNTER FOR SCREENING FOR OTHER VIRAL DISEASES: ICD-10-CM

## 2021-02-15 PROCEDURE — U0005 INFEC AGEN DETEC AMPLI PROBE: HCPCS | Performed by: INTERNAL MEDICINE

## 2021-02-15 PROCEDURE — U0003 INFECTIOUS AGENT DETECTION BY NUCLEIC ACID (DNA OR RNA); SEVERE ACUTE RESPIRATORY SYNDROME CORONAVIRUS 2 (SARS-COV-2) (CORONAVIRUS DISEASE [COVID-19]), AMPLIFIED PROBE TECHNIQUE, MAKING USE OF HIGH THROUGHPUT TECHNOLOGIES AS DESCRIBED BY CMS-2020-01-R: HCPCS | Performed by: INTERNAL MEDICINE

## 2021-02-16 LAB — SARS-COV-2 RNA RESP QL NAA+PROBE: NEGATIVE

## 2021-02-22 PROCEDURE — U0003 INFECTIOUS AGENT DETECTION BY NUCLEIC ACID (DNA OR RNA); SEVERE ACUTE RESPIRATORY SYNDROME CORONAVIRUS 2 (SARS-COV-2) (CORONAVIRUS DISEASE [COVID-19]), AMPLIFIED PROBE TECHNIQUE, MAKING USE OF HIGH THROUGHPUT TECHNOLOGIES AS DESCRIBED BY CMS-2020-01-R: HCPCS | Performed by: INTERNAL MEDICINE

## 2021-02-22 PROCEDURE — U0005 INFEC AGEN DETEC AMPLI PROBE: HCPCS | Performed by: INTERNAL MEDICINE

## 2021-02-23 ENCOUNTER — LAB REQUISITION (OUTPATIENT)
Dept: LAB | Facility: HOSPITAL | Age: 46
End: 2021-02-23
Payer: COMMERCIAL

## 2021-02-23 DIAGNOSIS — Z11.59 ENCOUNTER FOR SCREENING FOR OTHER VIRAL DISEASES: ICD-10-CM

## 2021-02-23 LAB — SARS-COV-2 RNA RESP QL NAA+PROBE: NEGATIVE

## 2021-03-01 ENCOUNTER — LAB REQUISITION (OUTPATIENT)
Dept: LAB | Facility: HOSPITAL | Age: 46
End: 2021-03-01
Payer: COMMERCIAL

## 2021-03-01 DIAGNOSIS — Z11.59 ENCOUNTER FOR SCREENING FOR OTHER VIRAL DISEASES: ICD-10-CM

## 2021-03-01 PROCEDURE — U0003 INFECTIOUS AGENT DETECTION BY NUCLEIC ACID (DNA OR RNA); SEVERE ACUTE RESPIRATORY SYNDROME CORONAVIRUS 2 (SARS-COV-2) (CORONAVIRUS DISEASE [COVID-19]), AMPLIFIED PROBE TECHNIQUE, MAKING USE OF HIGH THROUGHPUT TECHNOLOGIES AS DESCRIBED BY CMS-2020-01-R: HCPCS | Performed by: INTERNAL MEDICINE

## 2021-03-01 PROCEDURE — U0005 INFEC AGEN DETEC AMPLI PROBE: HCPCS | Performed by: INTERNAL MEDICINE

## 2021-03-02 LAB — SARS-COV-2 RNA RESP QL NAA+PROBE: NEGATIVE

## 2021-03-03 ENCOUNTER — LAB REQUISITION (OUTPATIENT)
Dept: LAB | Facility: HOSPITAL | Age: 46
End: 2021-03-03
Payer: COMMERCIAL

## 2021-03-03 DIAGNOSIS — Z11.59 ENCOUNTER FOR SCREENING FOR OTHER VIRAL DISEASES: ICD-10-CM

## 2021-03-03 PROCEDURE — U0003 INFECTIOUS AGENT DETECTION BY NUCLEIC ACID (DNA OR RNA); SEVERE ACUTE RESPIRATORY SYNDROME CORONAVIRUS 2 (SARS-COV-2) (CORONAVIRUS DISEASE [COVID-19]), AMPLIFIED PROBE TECHNIQUE, MAKING USE OF HIGH THROUGHPUT TECHNOLOGIES AS DESCRIBED BY CMS-2020-01-R: HCPCS | Performed by: INTERNAL MEDICINE

## 2021-03-03 PROCEDURE — U0005 INFEC AGEN DETEC AMPLI PROBE: HCPCS | Performed by: INTERNAL MEDICINE

## 2021-03-04 LAB — SARS-COV-2 RNA RESP QL NAA+PROBE: NEGATIVE

## 2021-03-08 ENCOUNTER — LAB REQUISITION (OUTPATIENT)
Dept: LAB | Facility: HOSPITAL | Age: 46
End: 2021-03-08
Payer: COMMERCIAL

## 2021-03-08 DIAGNOSIS — Z11.59 ENCOUNTER FOR SCREENING FOR OTHER VIRAL DISEASES: ICD-10-CM

## 2021-03-08 PROCEDURE — U0003 INFECTIOUS AGENT DETECTION BY NUCLEIC ACID (DNA OR RNA); SEVERE ACUTE RESPIRATORY SYNDROME CORONAVIRUS 2 (SARS-COV-2) (CORONAVIRUS DISEASE [COVID-19]), AMPLIFIED PROBE TECHNIQUE, MAKING USE OF HIGH THROUGHPUT TECHNOLOGIES AS DESCRIBED BY CMS-2020-01-R: HCPCS | Performed by: INTERNAL MEDICINE

## 2021-03-08 PROCEDURE — U0005 INFEC AGEN DETEC AMPLI PROBE: HCPCS | Performed by: INTERNAL MEDICINE

## 2021-03-09 LAB — SARS-COV-2 RNA RESP QL NAA+PROBE: NEGATIVE

## 2021-03-15 ENCOUNTER — LAB REQUISITION (OUTPATIENT)
Dept: LAB | Facility: HOSPITAL | Age: 46
End: 2021-03-15
Payer: COMMERCIAL

## 2021-03-15 DIAGNOSIS — Z11.59 ENCOUNTER FOR SCREENING FOR OTHER VIRAL DISEASES: ICD-10-CM

## 2021-03-15 PROCEDURE — U0005 INFEC AGEN DETEC AMPLI PROBE: HCPCS | Performed by: INTERNAL MEDICINE

## 2021-03-15 PROCEDURE — U0003 INFECTIOUS AGENT DETECTION BY NUCLEIC ACID (DNA OR RNA); SEVERE ACUTE RESPIRATORY SYNDROME CORONAVIRUS 2 (SARS-COV-2) (CORONAVIRUS DISEASE [COVID-19]), AMPLIFIED PROBE TECHNIQUE, MAKING USE OF HIGH THROUGHPUT TECHNOLOGIES AS DESCRIBED BY CMS-2020-01-R: HCPCS | Performed by: INTERNAL MEDICINE

## 2021-03-16 LAB — SARS-COV-2 RNA RESP QL NAA+PROBE: NEGATIVE

## 2021-03-22 ENCOUNTER — LAB REQUISITION (OUTPATIENT)
Dept: LAB | Facility: HOSPITAL | Age: 46
End: 2021-03-22
Payer: COMMERCIAL

## 2021-03-22 DIAGNOSIS — Z11.59 ENCOUNTER FOR SCREENING FOR OTHER VIRAL DISEASES: ICD-10-CM

## 2021-03-22 PROCEDURE — U0003 INFECTIOUS AGENT DETECTION BY NUCLEIC ACID (DNA OR RNA); SEVERE ACUTE RESPIRATORY SYNDROME CORONAVIRUS 2 (SARS-COV-2) (CORONAVIRUS DISEASE [COVID-19]), AMPLIFIED PROBE TECHNIQUE, MAKING USE OF HIGH THROUGHPUT TECHNOLOGIES AS DESCRIBED BY CMS-2020-01-R: HCPCS | Performed by: INTERNAL MEDICINE

## 2021-03-22 PROCEDURE — U0005 INFEC AGEN DETEC AMPLI PROBE: HCPCS | Performed by: INTERNAL MEDICINE

## 2021-03-23 LAB — SARS-COV-2 RNA RESP QL NAA+PROBE: NEGATIVE

## 2021-03-29 PROCEDURE — U0005 INFEC AGEN DETEC AMPLI PROBE: HCPCS | Performed by: INTERNAL MEDICINE

## 2021-03-29 PROCEDURE — U0003 INFECTIOUS AGENT DETECTION BY NUCLEIC ACID (DNA OR RNA); SEVERE ACUTE RESPIRATORY SYNDROME CORONAVIRUS 2 (SARS-COV-2) (CORONAVIRUS DISEASE [COVID-19]), AMPLIFIED PROBE TECHNIQUE, MAKING USE OF HIGH THROUGHPUT TECHNOLOGIES AS DESCRIBED BY CMS-2020-01-R: HCPCS | Performed by: INTERNAL MEDICINE

## 2021-03-30 ENCOUNTER — LAB REQUISITION (OUTPATIENT)
Dept: LAB | Facility: HOSPITAL | Age: 46
End: 2021-03-30
Payer: COMMERCIAL

## 2021-03-30 DIAGNOSIS — Z11.59 ENCOUNTER FOR SCREENING FOR OTHER VIRAL DISEASES: ICD-10-CM

## 2021-03-30 LAB — SARS-COV-2 RNA RESP QL NAA+PROBE: NEGATIVE

## 2021-04-07 ENCOUNTER — LAB REQUISITION (OUTPATIENT)
Dept: LAB | Facility: HOSPITAL | Age: 46
End: 2021-04-07
Payer: COMMERCIAL

## 2021-04-07 DIAGNOSIS — Z11.59 ENCOUNTER FOR SCREENING FOR OTHER VIRAL DISEASES: ICD-10-CM

## 2021-04-07 PROCEDURE — U0005 INFEC AGEN DETEC AMPLI PROBE: HCPCS | Performed by: INTERNAL MEDICINE

## 2021-04-07 PROCEDURE — U0003 INFECTIOUS AGENT DETECTION BY NUCLEIC ACID (DNA OR RNA); SEVERE ACUTE RESPIRATORY SYNDROME CORONAVIRUS 2 (SARS-COV-2) (CORONAVIRUS DISEASE [COVID-19]), AMPLIFIED PROBE TECHNIQUE, MAKING USE OF HIGH THROUGHPUT TECHNOLOGIES AS DESCRIBED BY CMS-2020-01-R: HCPCS | Performed by: INTERNAL MEDICINE

## 2021-04-08 LAB — SARS-COV-2 RNA RESP QL NAA+PROBE: NEGATIVE

## 2021-04-12 ENCOUNTER — LAB REQUISITION (OUTPATIENT)
Dept: LAB | Facility: HOSPITAL | Age: 46
End: 2021-04-12
Payer: COMMERCIAL

## 2021-04-12 DIAGNOSIS — Z11.59 ENCOUNTER FOR SCREENING FOR OTHER VIRAL DISEASES: ICD-10-CM

## 2021-04-12 PROCEDURE — U0005 INFEC AGEN DETEC AMPLI PROBE: HCPCS | Performed by: INTERNAL MEDICINE

## 2021-04-12 PROCEDURE — U0003 INFECTIOUS AGENT DETECTION BY NUCLEIC ACID (DNA OR RNA); SEVERE ACUTE RESPIRATORY SYNDROME CORONAVIRUS 2 (SARS-COV-2) (CORONAVIRUS DISEASE [COVID-19]), AMPLIFIED PROBE TECHNIQUE, MAKING USE OF HIGH THROUGHPUT TECHNOLOGIES AS DESCRIBED BY CMS-2020-01-R: HCPCS | Performed by: INTERNAL MEDICINE

## 2021-04-13 LAB — SARS-COV-2 RNA RESP QL NAA+PROBE: NEGATIVE

## 2021-04-14 ENCOUNTER — LAB REQUISITION (OUTPATIENT)
Dept: LAB | Facility: HOSPITAL | Age: 46
End: 2021-04-14
Payer: COMMERCIAL

## 2021-04-14 DIAGNOSIS — Z11.59 ENCOUNTER FOR SCREENING FOR OTHER VIRAL DISEASES: ICD-10-CM

## 2021-04-14 PROCEDURE — U0003 INFECTIOUS AGENT DETECTION BY NUCLEIC ACID (DNA OR RNA); SEVERE ACUTE RESPIRATORY SYNDROME CORONAVIRUS 2 (SARS-COV-2) (CORONAVIRUS DISEASE [COVID-19]), AMPLIFIED PROBE TECHNIQUE, MAKING USE OF HIGH THROUGHPUT TECHNOLOGIES AS DESCRIBED BY CMS-2020-01-R: HCPCS | Performed by: INTERNAL MEDICINE

## 2021-04-14 PROCEDURE — U0005 INFEC AGEN DETEC AMPLI PROBE: HCPCS | Performed by: INTERNAL MEDICINE

## 2021-04-15 LAB — SARS-COV-2 RNA RESP QL NAA+PROBE: NEGATIVE

## 2021-04-19 ENCOUNTER — LAB REQUISITION (OUTPATIENT)
Dept: LAB | Facility: HOSPITAL | Age: 46
End: 2021-04-19
Payer: COMMERCIAL

## 2021-04-19 DIAGNOSIS — Z11.59 ENCOUNTER FOR SCREENING FOR OTHER VIRAL DISEASES: ICD-10-CM

## 2021-04-19 PROCEDURE — U0003 INFECTIOUS AGENT DETECTION BY NUCLEIC ACID (DNA OR RNA); SEVERE ACUTE RESPIRATORY SYNDROME CORONAVIRUS 2 (SARS-COV-2) (CORONAVIRUS DISEASE [COVID-19]), AMPLIFIED PROBE TECHNIQUE, MAKING USE OF HIGH THROUGHPUT TECHNOLOGIES AS DESCRIBED BY CMS-2020-01-R: HCPCS | Performed by: INTERNAL MEDICINE

## 2021-04-19 PROCEDURE — U0005 INFEC AGEN DETEC AMPLI PROBE: HCPCS | Performed by: INTERNAL MEDICINE

## 2021-04-20 LAB — SARS-COV-2 RNA RESP QL NAA+PROBE: NEGATIVE

## 2021-04-21 ENCOUNTER — LAB REQUISITION (OUTPATIENT)
Dept: LAB | Facility: HOSPITAL | Age: 46
End: 2021-04-21
Payer: COMMERCIAL

## 2021-04-21 DIAGNOSIS — Z11.59 ENCOUNTER FOR SCREENING FOR OTHER VIRAL DISEASES: ICD-10-CM

## 2021-04-21 PROCEDURE — U0005 INFEC AGEN DETEC AMPLI PROBE: HCPCS | Performed by: INTERNAL MEDICINE

## 2021-04-21 PROCEDURE — U0003 INFECTIOUS AGENT DETECTION BY NUCLEIC ACID (DNA OR RNA); SEVERE ACUTE RESPIRATORY SYNDROME CORONAVIRUS 2 (SARS-COV-2) (CORONAVIRUS DISEASE [COVID-19]), AMPLIFIED PROBE TECHNIQUE, MAKING USE OF HIGH THROUGHPUT TECHNOLOGIES AS DESCRIBED BY CMS-2020-01-R: HCPCS | Performed by: INTERNAL MEDICINE

## 2021-04-22 ENCOUNTER — OFFICE VISIT (OUTPATIENT)
Dept: URGENT CARE | Age: 46
End: 2021-04-22
Payer: COMMERCIAL

## 2021-04-22 VITALS
TEMPERATURE: 98 F | RESPIRATION RATE: 18 BRPM | SYSTOLIC BLOOD PRESSURE: 164 MMHG | HEART RATE: 79 BPM | OXYGEN SATURATION: 100 % | DIASTOLIC BLOOD PRESSURE: 92 MMHG

## 2021-04-22 DIAGNOSIS — R39.89 POSSIBLE URINARY TRACT INFECTION: Primary | ICD-10-CM

## 2021-04-22 LAB
SARS-COV-2 RNA RESP QL NAA+PROBE: NEGATIVE
SL AMB  POCT GLUCOSE, UA: NEGATIVE
SL AMB LEUKOCYTE ESTERASE,UA: NEGATIVE
SL AMB POCT BILIRUBIN,UA: NEGATIVE
SL AMB POCT BLOOD,UA: NEGATIVE
SL AMB POCT CLARITY,UA: NORMAL
SL AMB POCT COLOR,UA: YELLOW
SL AMB POCT KETONES,UA: NEGATIVE
SL AMB POCT NITRITE,UA: NEGATIVE
SL AMB POCT PH,UA: 5
SL AMB POCT SPECIFIC GRAVITY,UA: 1.05
SL AMB POCT URINE PROTEIN: NEGATIVE
SL AMB POCT UROBILINOGEN: NEGATIVE

## 2021-04-22 PROCEDURE — 87086 URINE CULTURE/COLONY COUNT: CPT | Performed by: NURSE PRACTITIONER

## 2021-04-22 PROCEDURE — 81002 URINALYSIS NONAUTO W/O SCOPE: CPT | Performed by: NURSE PRACTITIONER

## 2021-04-22 PROCEDURE — S9083 URGENT CARE CENTER GLOBAL: HCPCS | Performed by: NURSE PRACTITIONER

## 2021-04-22 PROCEDURE — G0382 LEV 3 HOSP TYPE B ED VISIT: HCPCS | Performed by: NURSE PRACTITIONER

## 2021-04-22 RX ORDER — NITROFURANTOIN 25; 75 MG/1; MG/1
100 CAPSULE ORAL 2 TIMES DAILY
Qty: 10 CAPSULE | Refills: 0 | Status: SHIPPED | OUTPATIENT
Start: 2021-04-22 | End: 2021-09-10 | Stop reason: ALTCHOICE

## 2021-04-22 NOTE — PATIENT INSTRUCTIONS
Urinary Tract Infection in Women   WHAT YOU NEED TO KNOW:   A urinary tract infection (UTI) is caused by bacteria that get inside your urinary tract  Most bacteria that enter your urinary tract come out when you urinate  If the bacteria stay in your urinary tract, you may get an infection  Your urinary tract includes your kidneys, ureters, bladder, and urethra  Urine is made in your kidneys, and it flows from the ureters to the bladder  Urine leaves the bladder through the urethra  A UTI is more common in your lower urinary tract, which includes your bladder and urethra  DISCHARGE INSTRUCTIONS:   Return to the emergency department if:   · You are urinating very little or not at all  · You have a high fever with shaking chills  · You have side or back pain that gets worse  Call your doctor if:   · You have a fever  · You do not feel better after 2 days of taking antibiotics  · You are vomiting  · You have questions or concerns about your condition or care  Medicines:   · Antibiotics  help fight a bacterial infection  If you have UTIs often (called recurrent UTIs), you may be given antibiotics to take regularly  You will be given directions for when and how to use antibiotics  The goal is to prevent UTIs but not cause antibiotic resistance by using antibiotics too often  · Medicines  may be given to decrease pain and burning when you urinate  They will also help decrease the feeling that you need to urinate often  These medicines will make your urine orange or red  · Take your medicine as directed  Contact your healthcare provider if you think your medicine is not helping or if you have side effects  Tell him or her if you are allergic to any medicine  Keep a list of the medicines, vitamins, and herbs you take  Include the amounts, and when and why you take them  Bring the list or the pill bottles to follow-up visits   Carry your medicine list with you in case of an emergency  Follow up with your healthcare provider as directed:  Write down your questions so you remember to ask them during your visits  Prevent another UTI:   · Empty your bladder often  Urinate and empty your bladder as soon as you feel the need  Do not hold your urine for long periods of time  · Wipe from front to back after you urinate or have a bowel movement  This will help prevent germs from getting into your urinary tract through your urethra  · Drink liquids as directed  Ask how much liquid to drink each day and which liquids are best for you  You may need to drink more liquids than usual to help flush out the bacteria  Do not drink alcohol, caffeine, or citrus juices  These can irritate your bladder and increase your symptoms  Your healthcare provider may recommend cranberry juice to help prevent a UTI  · Urinate after you have sex  This can help flush out bacteria passed during sex  · Do not douche or use feminine deodorants  These can change the chemical balance in your vagina  · Change sanitary pads or tampons often  This will help prevent germs from getting into your urinary tract  · Talk to your healthcare provider about your birth control method  You may need to change your method if it is increasing your risk for UTIs  · Wear cotton underwear and clothes that are loose  Tight pants and nylon underwear can trap moisture and cause bacteria to grow  · Vaginal estrogen may be recommended  This medicine helps prevent UTIs in women who have gone through menopause or are in sussy-menopause  · Do pelvic muscle exercises often  Pelvic muscle exercises may help you start and stop urinating  Strong pelvic muscles may help you empty your bladder easier  Squeeze these muscles tightly for 5 seconds like you are trying to hold back urine  Then relax for 5 seconds  Gradually work up to squeezing for 10 seconds  Do 3 sets of 15 repetitions a day, or as directed      © Copyright [address] Information is for [employer] use only and may not be sold, redistributed or otherwise used for commercial purposes  All illustrations and images included in CareNotes® are the copyrighted property of [employer] D A M [employer]  or Coleen Neumann  The above information is an  only  It is not intended as medical advice for individual conditions or treatments  Talk to your doctor, nurse or pharmacist before following any medical regimen to see if it is safe and effective for you

## 2021-04-23 LAB — BACTERIA UR CULT: NORMAL

## 2021-04-26 ENCOUNTER — LAB REQUISITION (OUTPATIENT)
Dept: LAB | Facility: HOSPITAL | Age: 46
End: 2021-04-26
Payer: COMMERCIAL

## 2021-04-26 DIAGNOSIS — Z11.59 ENCOUNTER FOR SCREENING FOR OTHER VIRAL DISEASES: ICD-10-CM

## 2021-04-26 PROCEDURE — U0005 INFEC AGEN DETEC AMPLI PROBE: HCPCS | Performed by: INTERNAL MEDICINE

## 2021-04-26 PROCEDURE — U0003 INFECTIOUS AGENT DETECTION BY NUCLEIC ACID (DNA OR RNA); SEVERE ACUTE RESPIRATORY SYNDROME CORONAVIRUS 2 (SARS-COV-2) (CORONAVIRUS DISEASE [COVID-19]), AMPLIFIED PROBE TECHNIQUE, MAKING USE OF HIGH THROUGHPUT TECHNOLOGIES AS DESCRIBED BY CMS-2020-01-R: HCPCS | Performed by: INTERNAL MEDICINE

## 2021-04-27 LAB — SARS-COV-2 RNA RESP QL NAA+PROBE: NEGATIVE

## 2021-05-03 ENCOUNTER — LAB REQUISITION (OUTPATIENT)
Dept: LAB | Facility: HOSPITAL | Age: 46
End: 2021-05-03
Payer: COMMERCIAL

## 2021-05-03 DIAGNOSIS — Z11.59 ENCOUNTER FOR SCREENING FOR OTHER VIRAL DISEASES: ICD-10-CM

## 2021-05-03 PROCEDURE — U0005 INFEC AGEN DETEC AMPLI PROBE: HCPCS | Performed by: INTERNAL MEDICINE

## 2021-05-03 PROCEDURE — U0003 INFECTIOUS AGENT DETECTION BY NUCLEIC ACID (DNA OR RNA); SEVERE ACUTE RESPIRATORY SYNDROME CORONAVIRUS 2 (SARS-COV-2) (CORONAVIRUS DISEASE [COVID-19]), AMPLIFIED PROBE TECHNIQUE, MAKING USE OF HIGH THROUGHPUT TECHNOLOGIES AS DESCRIBED BY CMS-2020-01-R: HCPCS | Performed by: INTERNAL MEDICINE

## 2021-05-04 LAB — SARS-COV-2 RNA RESP QL NAA+PROBE: NEGATIVE

## 2021-05-12 ENCOUNTER — LAB REQUISITION (OUTPATIENT)
Dept: LAB | Facility: HOSPITAL | Age: 46
End: 2021-05-12
Payer: COMMERCIAL

## 2021-05-12 DIAGNOSIS — Z11.59 ENCOUNTER FOR SCREENING FOR OTHER VIRAL DISEASES: ICD-10-CM

## 2021-05-12 PROCEDURE — U0005 INFEC AGEN DETEC AMPLI PROBE: HCPCS | Performed by: INTERNAL MEDICINE

## 2021-05-12 PROCEDURE — U0003 INFECTIOUS AGENT DETECTION BY NUCLEIC ACID (DNA OR RNA); SEVERE ACUTE RESPIRATORY SYNDROME CORONAVIRUS 2 (SARS-COV-2) (CORONAVIRUS DISEASE [COVID-19]), AMPLIFIED PROBE TECHNIQUE, MAKING USE OF HIGH THROUGHPUT TECHNOLOGIES AS DESCRIBED BY CMS-2020-01-R: HCPCS | Performed by: INTERNAL MEDICINE

## 2021-05-13 LAB — SARS-COV-2 RNA RESP QL NAA+PROBE: NEGATIVE

## 2021-05-17 ENCOUNTER — LAB REQUISITION (OUTPATIENT)
Dept: LAB | Facility: HOSPITAL | Age: 46
End: 2021-05-17
Payer: COMMERCIAL

## 2021-05-17 DIAGNOSIS — Z11.59 ENCOUNTER FOR SCREENING FOR OTHER VIRAL DISEASES: ICD-10-CM

## 2021-05-17 PROCEDURE — U0003 INFECTIOUS AGENT DETECTION BY NUCLEIC ACID (DNA OR RNA); SEVERE ACUTE RESPIRATORY SYNDROME CORONAVIRUS 2 (SARS-COV-2) (CORONAVIRUS DISEASE [COVID-19]), AMPLIFIED PROBE TECHNIQUE, MAKING USE OF HIGH THROUGHPUT TECHNOLOGIES AS DESCRIBED BY CMS-2020-01-R: HCPCS | Performed by: INTERNAL MEDICINE

## 2021-05-17 PROCEDURE — U0005 INFEC AGEN DETEC AMPLI PROBE: HCPCS | Performed by: INTERNAL MEDICINE

## 2021-05-18 LAB — SARS-COV-2 RNA RESP QL NAA+PROBE: NEGATIVE

## 2021-05-24 ENCOUNTER — LAB REQUISITION (OUTPATIENT)
Dept: LAB | Facility: HOSPITAL | Age: 46
End: 2021-05-24
Payer: COMMERCIAL

## 2021-05-24 DIAGNOSIS — Z11.59 ENCOUNTER FOR SCREENING FOR OTHER VIRAL DISEASES: ICD-10-CM

## 2021-05-24 PROCEDURE — U0003 INFECTIOUS AGENT DETECTION BY NUCLEIC ACID (DNA OR RNA); SEVERE ACUTE RESPIRATORY SYNDROME CORONAVIRUS 2 (SARS-COV-2) (CORONAVIRUS DISEASE [COVID-19]), AMPLIFIED PROBE TECHNIQUE, MAKING USE OF HIGH THROUGHPUT TECHNOLOGIES AS DESCRIBED BY CMS-2020-01-R: HCPCS | Performed by: INTERNAL MEDICINE

## 2021-05-24 PROCEDURE — U0005 INFEC AGEN DETEC AMPLI PROBE: HCPCS | Performed by: INTERNAL MEDICINE

## 2021-05-25 LAB — SARS-COV-2 RNA RESP QL NAA+PROBE: NEGATIVE

## 2021-06-01 ENCOUNTER — LAB REQUISITION (OUTPATIENT)
Dept: LAB | Facility: HOSPITAL | Age: 46
End: 2021-06-01
Payer: COMMERCIAL

## 2021-06-01 DIAGNOSIS — Z11.59 ENCOUNTER FOR SCREENING FOR OTHER VIRAL DISEASES: ICD-10-CM

## 2021-06-01 PROCEDURE — U0005 INFEC AGEN DETEC AMPLI PROBE: HCPCS | Performed by: INTERNAL MEDICINE

## 2021-06-01 PROCEDURE — U0003 INFECTIOUS AGENT DETECTION BY NUCLEIC ACID (DNA OR RNA); SEVERE ACUTE RESPIRATORY SYNDROME CORONAVIRUS 2 (SARS-COV-2) (CORONAVIRUS DISEASE [COVID-19]), AMPLIFIED PROBE TECHNIQUE, MAKING USE OF HIGH THROUGHPUT TECHNOLOGIES AS DESCRIBED BY CMS-2020-01-R: HCPCS | Performed by: INTERNAL MEDICINE

## 2021-06-02 LAB — SARS-COV-2 RNA RESP QL NAA+PROBE: NEGATIVE

## 2021-07-06 PROCEDURE — U0003 INFECTIOUS AGENT DETECTION BY NUCLEIC ACID (DNA OR RNA); SEVERE ACUTE RESPIRATORY SYNDROME CORONAVIRUS 2 (SARS-COV-2) (CORONAVIRUS DISEASE [COVID-19]), AMPLIFIED PROBE TECHNIQUE, MAKING USE OF HIGH THROUGHPUT TECHNOLOGIES AS DESCRIBED BY CMS-2020-01-R: HCPCS | Performed by: INTERNAL MEDICINE

## 2021-07-06 PROCEDURE — U0005 INFEC AGEN DETEC AMPLI PROBE: HCPCS | Performed by: INTERNAL MEDICINE

## 2021-07-07 ENCOUNTER — LAB REQUISITION (OUTPATIENT)
Dept: LAB | Facility: HOSPITAL | Age: 46
End: 2021-07-07
Payer: COMMERCIAL

## 2021-07-07 DIAGNOSIS — Z11.59 ENCOUNTER FOR SCREENING FOR OTHER VIRAL DISEASES: ICD-10-CM

## 2021-07-07 LAB — SARS-COV-2 RNA RESP QL NAA+PROBE: NEGATIVE

## 2021-08-03 PROCEDURE — U0005 INFEC AGEN DETEC AMPLI PROBE: HCPCS | Performed by: INTERNAL MEDICINE

## 2021-08-03 PROCEDURE — U0003 INFECTIOUS AGENT DETECTION BY NUCLEIC ACID (DNA OR RNA); SEVERE ACUTE RESPIRATORY SYNDROME CORONAVIRUS 2 (SARS-COV-2) (CORONAVIRUS DISEASE [COVID-19]), AMPLIFIED PROBE TECHNIQUE, MAKING USE OF HIGH THROUGHPUT TECHNOLOGIES AS DESCRIBED BY CMS-2020-01-R: HCPCS | Performed by: INTERNAL MEDICINE

## 2021-08-04 ENCOUNTER — LAB REQUISITION (OUTPATIENT)
Dept: LAB | Facility: HOSPITAL | Age: 46
End: 2021-08-04
Payer: COMMERCIAL

## 2021-08-04 DIAGNOSIS — Z11.59 ENCOUNTER FOR SCREENING FOR OTHER VIRAL DISEASES: ICD-10-CM

## 2021-08-04 LAB — SARS-COV-2 RNA RESP QL NAA+PROBE: NEGATIVE

## 2021-08-13 PROCEDURE — U0005 INFEC AGEN DETEC AMPLI PROBE: HCPCS | Performed by: INTERNAL MEDICINE

## 2021-08-13 PROCEDURE — U0003 INFECTIOUS AGENT DETECTION BY NUCLEIC ACID (DNA OR RNA); SEVERE ACUTE RESPIRATORY SYNDROME CORONAVIRUS 2 (SARS-COV-2) (CORONAVIRUS DISEASE [COVID-19]), AMPLIFIED PROBE TECHNIQUE, MAKING USE OF HIGH THROUGHPUT TECHNOLOGIES AS DESCRIBED BY CMS-2020-01-R: HCPCS | Performed by: INTERNAL MEDICINE

## 2021-08-14 ENCOUNTER — LAB REQUISITION (OUTPATIENT)
Dept: LAB | Facility: HOSPITAL | Age: 46
End: 2021-08-14
Payer: COMMERCIAL

## 2021-08-14 DIAGNOSIS — Z11.59 ENCOUNTER FOR SCREENING FOR OTHER VIRAL DISEASES: ICD-10-CM

## 2021-08-14 LAB — SARS-COV-2 RNA RESP QL NAA+PROBE: NEGATIVE

## 2021-08-17 PROCEDURE — U0003 INFECTIOUS AGENT DETECTION BY NUCLEIC ACID (DNA OR RNA); SEVERE ACUTE RESPIRATORY SYNDROME CORONAVIRUS 2 (SARS-COV-2) (CORONAVIRUS DISEASE [COVID-19]), AMPLIFIED PROBE TECHNIQUE, MAKING USE OF HIGH THROUGHPUT TECHNOLOGIES AS DESCRIBED BY CMS-2020-01-R: HCPCS | Performed by: INTERNAL MEDICINE

## 2021-08-17 PROCEDURE — U0005 INFEC AGEN DETEC AMPLI PROBE: HCPCS | Performed by: INTERNAL MEDICINE

## 2021-08-18 ENCOUNTER — LAB REQUISITION (OUTPATIENT)
Dept: LAB | Facility: HOSPITAL | Age: 46
End: 2021-08-18
Payer: COMMERCIAL

## 2021-08-18 DIAGNOSIS — Z11.59 ENCOUNTER FOR SCREENING FOR OTHER VIRAL DISEASES: ICD-10-CM

## 2021-08-18 LAB — SARS-COV-2 RNA RESP QL NAA+PROBE: NEGATIVE

## 2021-08-24 PROCEDURE — U0005 INFEC AGEN DETEC AMPLI PROBE: HCPCS | Performed by: INTERNAL MEDICINE

## 2021-08-24 PROCEDURE — U0003 INFECTIOUS AGENT DETECTION BY NUCLEIC ACID (DNA OR RNA); SEVERE ACUTE RESPIRATORY SYNDROME CORONAVIRUS 2 (SARS-COV-2) (CORONAVIRUS DISEASE [COVID-19]), AMPLIFIED PROBE TECHNIQUE, MAKING USE OF HIGH THROUGHPUT TECHNOLOGIES AS DESCRIBED BY CMS-2020-01-R: HCPCS | Performed by: INTERNAL MEDICINE

## 2021-08-25 ENCOUNTER — LAB REQUISITION (OUTPATIENT)
Dept: LAB | Facility: HOSPITAL | Age: 46
End: 2021-08-25
Payer: COMMERCIAL

## 2021-08-25 DIAGNOSIS — Z11.59 ENCOUNTER FOR SCREENING FOR OTHER VIRAL DISEASES: ICD-10-CM

## 2021-08-25 LAB — SARS-COV-2 RNA RESP QL NAA+PROBE: NEGATIVE

## 2021-08-31 PROCEDURE — U0003 INFECTIOUS AGENT DETECTION BY NUCLEIC ACID (DNA OR RNA); SEVERE ACUTE RESPIRATORY SYNDROME CORONAVIRUS 2 (SARS-COV-2) (CORONAVIRUS DISEASE [COVID-19]), AMPLIFIED PROBE TECHNIQUE, MAKING USE OF HIGH THROUGHPUT TECHNOLOGIES AS DESCRIBED BY CMS-2020-01-R: HCPCS | Performed by: INTERNAL MEDICINE

## 2021-08-31 PROCEDURE — U0005 INFEC AGEN DETEC AMPLI PROBE: HCPCS | Performed by: INTERNAL MEDICINE

## 2021-09-01 ENCOUNTER — LAB REQUISITION (OUTPATIENT)
Dept: LAB | Facility: HOSPITAL | Age: 46
End: 2021-09-01
Payer: COMMERCIAL

## 2021-09-01 DIAGNOSIS — Z11.59 ENCOUNTER FOR SCREENING FOR OTHER VIRAL DISEASES: ICD-10-CM

## 2021-09-01 LAB — SARS-COV-2 RNA RESP QL NAA+PROBE: NEGATIVE

## 2021-09-07 PROCEDURE — U0005 INFEC AGEN DETEC AMPLI PROBE: HCPCS | Performed by: INTERNAL MEDICINE

## 2021-09-07 PROCEDURE — U0003 INFECTIOUS AGENT DETECTION BY NUCLEIC ACID (DNA OR RNA); SEVERE ACUTE RESPIRATORY SYNDROME CORONAVIRUS 2 (SARS-COV-2) (CORONAVIRUS DISEASE [COVID-19]), AMPLIFIED PROBE TECHNIQUE, MAKING USE OF HIGH THROUGHPUT TECHNOLOGIES AS DESCRIBED BY CMS-2020-01-R: HCPCS | Performed by: INTERNAL MEDICINE

## 2021-09-08 ENCOUNTER — LAB REQUISITION (OUTPATIENT)
Dept: LAB | Facility: HOSPITAL | Age: 46
End: 2021-09-08
Payer: COMMERCIAL

## 2021-09-08 DIAGNOSIS — Z11.59 ENCOUNTER FOR SCREENING FOR OTHER VIRAL DISEASES: ICD-10-CM

## 2021-09-08 LAB — SARS-COV-2 RNA RESP QL NAA+PROBE: NEGATIVE

## 2021-09-10 ENCOUNTER — TELEPHONE (OUTPATIENT)
Dept: FAMILY MEDICINE CLINIC | Facility: CLINIC | Age: 46
End: 2021-09-10

## 2021-09-10 ENCOUNTER — OFFICE VISIT (OUTPATIENT)
Dept: FAMILY MEDICINE CLINIC | Facility: CLINIC | Age: 46
End: 2021-09-10
Payer: COMMERCIAL

## 2021-09-10 VITALS
TEMPERATURE: 96.3 F | SYSTOLIC BLOOD PRESSURE: 148 MMHG | RESPIRATION RATE: 16 BRPM | BODY MASS INDEX: 24.35 KG/M2 | HEIGHT: 60 IN | DIASTOLIC BLOOD PRESSURE: 102 MMHG | HEART RATE: 88 BPM | WEIGHT: 124 LBS

## 2021-09-10 DIAGNOSIS — M79.662 PAIN OF LEFT LOWER LEG: ICD-10-CM

## 2021-09-10 DIAGNOSIS — Z13.220 SCREENING FOR HYPERLIPIDEMIA: ICD-10-CM

## 2021-09-10 DIAGNOSIS — I10 BENIGN ESSENTIAL HTN: Primary | ICD-10-CM

## 2021-09-10 DIAGNOSIS — D70.8 OTHER NEUTROPENIA (HCC): ICD-10-CM

## 2021-09-10 DIAGNOSIS — R25.2 LEG CRAMP: ICD-10-CM

## 2021-09-10 PROCEDURE — 3008F BODY MASS INDEX DOCD: CPT | Performed by: FAMILY MEDICINE

## 2021-09-10 PROCEDURE — 1036F TOBACCO NON-USER: CPT | Performed by: FAMILY MEDICINE

## 2021-09-10 PROCEDURE — 99214 OFFICE O/P EST MOD 30 MIN: CPT | Performed by: FAMILY MEDICINE

## 2021-09-10 RX ORDER — AMLODIPINE BESYLATE 2.5 MG/1
2.5 TABLET ORAL DAILY
Qty: 30 TABLET | Refills: 1 | Status: SHIPPED | OUTPATIENT
Start: 2021-09-10 | End: 2021-11-10

## 2021-09-10 NOTE — TELEPHONE ENCOUNTER
Patient called to schedule apt FY she is in for 11:30  /110, headache, dizzy, leg hurts  Should she be seen sooner? Please advise

## 2021-09-10 NOTE — ASSESSMENT & PLAN NOTE
Lab Results   Component Value Date    WBC 3 56 (L) 06/11/2020    HGB 13 4 06/11/2020    HCT 42 6 06/11/2020    MCV 82 06/11/2020     06/11/2020     Chronic, no signs or symptoms  She has seen heme/onc with reassuring work-up

## 2021-09-10 NOTE — ASSESSMENT & PLAN NOTE
BP Readings from Last 3 Encounters:   09/10/21 (!) 148/102   04/22/21 164/92   08/24/20 140/100     Lab Results   Component Value Date    CREATININE 0 68 08/20/2020    EGFR 123 08/20/2020     Patient had self-discontinued lisinopril  Discussed need for her to be on antihypertensive medications given poorly controlled blood pressure  She has had adverse effects on several other antihypertensive medications, she would like to try to resume low dose amlodipine   Will follow-up in 4-6 weeks to recheck and for physical

## 2021-09-10 NOTE — ASSESSMENT & PLAN NOTE
She has pain and tenderness in left lateral lower leg that started last night, believe pain is related to cramping in her legs, though she has some persistent tenderness  There are no signs of DVT such as swelling, posterior tenderness, palbable cording and Anika's is negative  Recommended supportive care with ice, strict return precautions given  If persists or develops other symptoms then order lower extremity US

## 2021-09-10 NOTE — PROGRESS NOTES
FAMILY MEDICINE PROGRESS NOTE    Date of Service: 09/10/21  Primary Care Provider:   Maribell Lezama MD       Name: Brittanie Ryan       : 1975       Age:46 y o  Sex: female      MRN: [id number]      Chief Complaint:Hypertension and Leg Pain (left)       ASSESSMENT and PLAN:  Brittanie Ryan is a 40 y o  female with:     Problem List Items Addressed This Visit        Cardiovascular and Mediastinum    Benign essential HTN - Primary     BP Readings from Last 3 Encounters:   09/10/21 (!) 148/102   21 164/92   20 140/100     Lab Results   Component Value Date    CREATININE 0 68 2020    EGFR 123 2020     Patient had self-discontinued lisinopril  Discussed need for her to be on antihypertensive medications given poorly controlled blood pressure  She has had adverse effects on several other antihypertensive medications, she would like to try to resume low dose amlodipine  Will follow-up in 4-6 weeks to recheck and for physical             Relevant Medications    amLODIPine (NORVASC) 2 5 mg tablet    Other Relevant Orders    Comprehensive metabolic panel       Other    Neutropenia (HCC)     Lab Results   Component Value Date    WBC 3 56 (L) 2020    HGB 13 4 2020    HCT 42 6 2020    MCV 82 2020     2020     Chronic, no signs or symptoms  She has seen heme/onc with reassuring work-up  Relevant Orders    CBC and differential    Pain of left lower leg     She has pain and tenderness in left lateral lower leg that started last night, believe pain is related to cramping in her legs, though she has some persistent tenderness  There are no signs of DVT such as swelling, posterior tenderness, palbable cording and Anika's is negative  Recommended supportive care with ice, strict return precautions given  If persists or develops other symptoms then order lower extremity US              Other Visit Diagnoses     Leg cramp        Screening for hyperlipidemia Relevant Orders    Lipid panel          SUBJECTIVE:  Seymour Veronica is a 40 y o  female who presents today with a chief complaint of Hypertension and Leg Pain (left)  HPI       She got her COVID vaccine on Wednesday  She had some headaches and dizziness after the COVID shot, but this is better  She reports that she had some left leg cramping while in bed last night  She has history of hypertension  She was previously on lisinopril, though she stopped it about 3 months ago  She reports it "made her arms feel jiggly " She also attributes her weight loss to the lisinopril  On review of records she has previously been on HCTZ, chlorthalidone in the past, but these caused hypokalemia  She had also been on amlodipine, but this made her lightheaded  Review of Systems   Constitutional: Negative for chills and fever  Eyes: Negative for visual disturbance  Respiratory: Negative for shortness of breath  Cardiovascular: Negative for chest pain, palpitations and leg swelling  Musculoskeletal: Positive for myalgias  Neurological: Negative for dizziness, light-headedness and headaches  I have reviewed the patient's Past Medical History  Current Outpatient Medications:     amLODIPine (NORVASC) 2 5 mg tablet, Take 1 tablet (2 5 mg total) by mouth daily, Disp: 30 tablet, Rfl: 1    OBJECTIVE:  BP (!) 148/102 (BP Location: Left arm, Patient Position: Sitting, Cuff Size: Adult)   Pulse 88   Temp (!) 96 3 °F (35 7 °C)   Resp 16   Ht 5' (1 524 m)   Wt 56 2 kg (124 lb)   LMP 09/10/2021 (Approximate)   Breastfeeding No   BMI 24 22 kg/m²    BP Readings from Last 3 Encounters:   09/10/21 (!) 148/102   04/22/21 164/92   08/24/20 140/100      Wt Readings from Last 3 Encounters:   09/10/21 56 2 kg (124 lb)   08/24/20 54 9 kg (121 lb)   08/17/20 53 5 kg (118 lb)      Physical Exam  Constitutional:       General: She is not in acute distress  Appearance: Normal appearance  She is normal weight   She is not ill-appearing or toxic-appearing  HENT:      Head: Normocephalic and atraumatic  Right Ear: External ear normal       Left Ear: External ear normal       Nose: Nose normal       Mouth/Throat:      Mouth: Mucous membranes are moist    Eyes:      Extraocular Movements: Extraocular movements intact  Conjunctiva/sclera: Conjunctivae normal    Cardiovascular:      Rate and Rhythm: Normal rate and regular rhythm  Pulses: Normal pulses  Heart sounds: Normal heart sounds  No murmur heard  No friction rub  No gallop  Pulmonary:      Effort: Pulmonary effort is normal  No respiratory distress  Breath sounds: Normal breath sounds  No wheezing, rhonchi or rales  Musculoskeletal:         General: Normal range of motion  Cervical back: Normal range of motion and neck supple  Right lower leg: No edema  Left lower leg: Tenderness present  No swelling or bony tenderness  No edema  Legs:       Comments: Tenderness to palpation over lateral aspect of left lower leg  There is no swelling, tenderness, palpable venous cording, negative Anika's sign    Skin:     Findings: No erythema or rash  Neurological:      General: No focal deficit present  Mental Status: She is alert and oriented to person, place, and time  Psychiatric:         Mood and Affect: Mood normal          Behavior: Behavior normal          Lab Results   Component Value Date    WBC 3 56 (L) 06/11/2020    HGB 13 4 06/11/2020    HCT 42 6 06/11/2020    MCV 82 06/11/2020     06/11/2020     Lab Results   Component Value Date    SODIUM 142 08/20/2020    K 3 5 08/20/2020     08/20/2020    CO2 30 08/20/2020    BUN 10 08/20/2020    CREATININE 0 68 08/20/2020    GLUC 85 03/09/2017    CALCIUM 8 5 08/20/2020            Return in about 4 weeks (around 10/8/2021) for physical     Ana Torres MD    Note: Portions of the record have been created with voice recognition software    Occasional wrong word or "sound a like" substitutions may have occurred due to the inherent limitations of voice recognition software  Read the chart carefully and recognize, using context, where substitutions have occurred

## 2021-10-20 ENCOUNTER — APPOINTMENT (OUTPATIENT)
Dept: LAB | Age: 46
End: 2021-10-20
Payer: COMMERCIAL

## 2021-10-20 DIAGNOSIS — I10 BENIGN ESSENTIAL HTN: ICD-10-CM

## 2021-10-20 DIAGNOSIS — D70.8 OTHER NEUTROPENIA (HCC): ICD-10-CM

## 2021-10-20 DIAGNOSIS — Z13.220 SCREENING FOR HYPERLIPIDEMIA: ICD-10-CM

## 2021-10-20 LAB
ALBUMIN SERPL BCP-MCNC: 3.2 G/DL (ref 3.5–5)
ALP SERPL-CCNC: 65 U/L (ref 46–116)
ALT SERPL W P-5'-P-CCNC: 26 U/L (ref 12–78)
ANION GAP SERPL CALCULATED.3IONS-SCNC: 1 MMOL/L (ref 4–13)
AST SERPL W P-5'-P-CCNC: 20 U/L (ref 5–45)
BASOPHILS # BLD AUTO: 0.03 THOUSANDS/ΜL (ref 0–0.1)
BASOPHILS NFR BLD AUTO: 1 % (ref 0–1)
BILIRUB SERPL-MCNC: 0.71 MG/DL (ref 0.2–1)
BUN SERPL-MCNC: 8 MG/DL (ref 5–25)
CALCIUM ALBUM COR SERPL-MCNC: 9.1 MG/DL (ref 8.3–10.1)
CALCIUM SERPL-MCNC: 8.5 MG/DL (ref 8.3–10.1)
CHLORIDE SERPL-SCNC: 109 MMOL/L (ref 100–108)
CHOLEST SERPL-MCNC: 189 MG/DL (ref 50–200)
CO2 SERPL-SCNC: 30 MMOL/L (ref 21–32)
CREAT SERPL-MCNC: 0.68 MG/DL (ref 0.6–1.3)
EOSINOPHIL # BLD AUTO: 0.08 THOUSAND/ΜL (ref 0–0.61)
EOSINOPHIL NFR BLD AUTO: 3 % (ref 0–6)
ERYTHROCYTE [DISTWIDTH] IN BLOOD BY AUTOMATED COUNT: 14.5 % (ref 11.6–15.1)
GFR SERPL CREATININE-BSD FRML MDRD: 122 ML/MIN/1.73SQ M
GLUCOSE P FAST SERPL-MCNC: 86 MG/DL (ref 65–99)
HCT VFR BLD AUTO: 38.1 % (ref 34.8–46.1)
HDLC SERPL-MCNC: 71 MG/DL
HGB BLD-MCNC: 11.9 G/DL (ref 11.5–15.4)
IMM GRANULOCYTES # BLD AUTO: 0.01 THOUSAND/UL (ref 0–0.2)
IMM GRANULOCYTES NFR BLD AUTO: 0 % (ref 0–2)
LDLC SERPL CALC-MCNC: 110 MG/DL (ref 0–100)
LYMPHOCYTES # BLD AUTO: 1.62 THOUSANDS/ΜL (ref 0.6–4.47)
LYMPHOCYTES NFR BLD AUTO: 57 % (ref 14–44)
MCH RBC QN AUTO: 25.2 PG (ref 26.8–34.3)
MCHC RBC AUTO-ENTMCNC: 31.2 G/DL (ref 31.4–37.4)
MCV RBC AUTO: 81 FL (ref 82–98)
MONOCYTES # BLD AUTO: 0.25 THOUSAND/ΜL (ref 0.17–1.22)
MONOCYTES NFR BLD AUTO: 9 % (ref 4–12)
NEUTROPHILS # BLD AUTO: 0.86 THOUSANDS/ΜL (ref 1.85–7.62)
NEUTS SEG NFR BLD AUTO: 30 % (ref 43–75)
NONHDLC SERPL-MCNC: 118 MG/DL
NRBC BLD AUTO-RTO: 0 /100 WBCS
PLATELET # BLD AUTO: 215 THOUSANDS/UL (ref 149–390)
PMV BLD AUTO: 10.8 FL (ref 8.9–12.7)
POTASSIUM SERPL-SCNC: 3.4 MMOL/L (ref 3.5–5.3)
PROT SERPL-MCNC: 7.2 G/DL (ref 6.4–8.2)
RBC # BLD AUTO: 4.73 MILLION/UL (ref 3.81–5.12)
SODIUM SERPL-SCNC: 140 MMOL/L (ref 136–145)
TRIGL SERPL-MCNC: 42 MG/DL
WBC # BLD AUTO: 2.85 THOUSAND/UL (ref 4.31–10.16)

## 2021-10-20 PROCEDURE — 80053 COMPREHEN METABOLIC PANEL: CPT

## 2021-10-20 PROCEDURE — 80061 LIPID PANEL: CPT

## 2021-10-20 PROCEDURE — 85025 COMPLETE CBC W/AUTO DIFF WBC: CPT

## 2021-10-20 PROCEDURE — 36415 COLL VENOUS BLD VENIPUNCTURE: CPT

## 2021-10-21 ENCOUNTER — TELEPHONE (OUTPATIENT)
Dept: FAMILY MEDICINE CLINIC | Facility: CLINIC | Age: 46
End: 2021-10-21

## 2021-11-10 ENCOUNTER — OFFICE VISIT (OUTPATIENT)
Dept: FAMILY MEDICINE CLINIC | Facility: CLINIC | Age: 46
End: 2021-11-10
Payer: COMMERCIAL

## 2021-11-10 VITALS
BODY MASS INDEX: 25.03 KG/M2 | HEIGHT: 60 IN | TEMPERATURE: 96.4 F | SYSTOLIC BLOOD PRESSURE: 124 MMHG | RESPIRATION RATE: 16 BRPM | WEIGHT: 127.5 LBS | DIASTOLIC BLOOD PRESSURE: 72 MMHG | HEART RATE: 88 BPM

## 2021-11-10 DIAGNOSIS — M79.662 PAIN OF LEFT LOWER LEG: ICD-10-CM

## 2021-11-10 DIAGNOSIS — D70.8 OTHER NEUTROPENIA (HCC): ICD-10-CM

## 2021-11-10 DIAGNOSIS — Z12.31 ENCOUNTER FOR SCREENING MAMMOGRAM FOR BREAST CANCER: ICD-10-CM

## 2021-11-10 DIAGNOSIS — Z23 INFLUENZA VACCINE NEEDED: ICD-10-CM

## 2021-11-10 DIAGNOSIS — E87.6 HYPOKALEMIA: ICD-10-CM

## 2021-11-10 DIAGNOSIS — I10 BENIGN ESSENTIAL HTN: ICD-10-CM

## 2021-11-10 DIAGNOSIS — E78.00 PURE HYPERCHOLESTEROLEMIA: ICD-10-CM

## 2021-11-10 DIAGNOSIS — Z00.00 ANNUAL PHYSICAL EXAM: Primary | ICD-10-CM

## 2021-11-10 PROCEDURE — 3008F BODY MASS INDEX DOCD: CPT | Performed by: FAMILY MEDICINE

## 2021-11-10 PROCEDURE — 90471 IMMUNIZATION ADMIN: CPT

## 2021-11-10 PROCEDURE — 1036F TOBACCO NON-USER: CPT | Performed by: FAMILY MEDICINE

## 2021-11-10 PROCEDURE — 99396 PREV VISIT EST AGE 40-64: CPT | Performed by: FAMILY MEDICINE

## 2021-11-10 PROCEDURE — 90686 IIV4 VACC NO PRSV 0.5 ML IM: CPT

## 2021-11-10 PROCEDURE — 3725F SCREEN DEPRESSION PERFORMED: CPT | Performed by: FAMILY MEDICINE

## 2021-11-10 RX ORDER — AMLODIPINE BESYLATE 2.5 MG/1
2.5 TABLET ORAL DAILY
Qty: 90 TABLET | Refills: 3 | Status: SHIPPED | OUTPATIENT
Start: 2021-11-10 | End: 2022-01-03 | Stop reason: SDUPTHER

## 2021-12-01 ENCOUNTER — CLINICAL SUPPORT (OUTPATIENT)
Dept: FAMILY MEDICINE CLINIC | Facility: CLINIC | Age: 46
End: 2021-12-01
Payer: COMMERCIAL

## 2021-12-01 VITALS
TEMPERATURE: 97.9 F | OXYGEN SATURATION: 98 % | HEART RATE: 78 BPM | SYSTOLIC BLOOD PRESSURE: 138 MMHG | DIASTOLIC BLOOD PRESSURE: 100 MMHG | RESPIRATION RATE: 18 BRPM

## 2021-12-01 DIAGNOSIS — I10 PRIMARY HYPERTENSION: Primary | ICD-10-CM

## 2021-12-01 PROCEDURE — 99211 OFF/OP EST MAY X REQ PHY/QHP: CPT

## 2021-12-01 PROCEDURE — 1036F TOBACCO NON-USER: CPT

## 2021-12-03 ENCOUNTER — APPOINTMENT (OUTPATIENT)
Dept: LAB | Age: 46
End: 2021-12-03
Payer: COMMERCIAL

## 2021-12-03 DIAGNOSIS — E03.9 HYPOTHYROIDISM, UNSPECIFIED TYPE: ICD-10-CM

## 2021-12-03 DIAGNOSIS — E87.6 HYPOKALEMIA: ICD-10-CM

## 2021-12-03 DIAGNOSIS — I10 BENIGN ESSENTIAL HTN: ICD-10-CM

## 2021-12-03 LAB
ANION GAP SERPL CALCULATED.3IONS-SCNC: 6 MMOL/L (ref 4–13)
BUN SERPL-MCNC: 12 MG/DL (ref 5–25)
CALCIUM SERPL-MCNC: 8.7 MG/DL (ref 8.3–10.1)
CHLORIDE SERPL-SCNC: 108 MMOL/L (ref 100–108)
CO2 SERPL-SCNC: 27 MMOL/L (ref 21–32)
CREAT SERPL-MCNC: 0.77 MG/DL (ref 0.6–1.3)
GFR SERPL CREATININE-BSD FRML MDRD: 108 ML/MIN/1.73SQ M
GLUCOSE P FAST SERPL-MCNC: 87 MG/DL (ref 65–99)
POTASSIUM SERPL-SCNC: 3.5 MMOL/L (ref 3.5–5.3)
SODIUM SERPL-SCNC: 141 MMOL/L (ref 136–145)

## 2021-12-03 PROCEDURE — 80048 BASIC METABOLIC PNL TOTAL CA: CPT

## 2021-12-03 PROCEDURE — 36415 COLL VENOUS BLD VENIPUNCTURE: CPT

## 2022-01-03 DIAGNOSIS — I10 BENIGN ESSENTIAL HTN: ICD-10-CM

## 2022-01-03 RX ORDER — AMLODIPINE BESYLATE 2.5 MG/1
2.5 TABLET ORAL DAILY
Qty: 90 TABLET | Refills: 3 | Status: SHIPPED | OUTPATIENT
Start: 2022-01-03 | End: 2022-06-21 | Stop reason: ALTCHOICE

## 2022-01-27 NOTE — PROGRESS NOTES
FAMILY MEDICINE PROGRESS NOTE    Date of Service: 22  Primary Care Provider:   Ana Cristina Mahajan MD       Name: Chelle Le       : 1975       Age:46 y o  Sex: female      MRN: [id number]      Chief Complaint:Numbness (Of hands and right great toe on right foot) and Breast Pain (Bilateral breast pain for a week)       ASSESSMENT and PLAN:  Chelle Le is a 40 y o  female with:     Problem List Items Addressed This Visit        Cardiovascular and Mediastinum    Benign essential HTN     BP Readings from Last 3 Encounters:   22 128/90   21 138/100   11/10/21 124/72     Lab Results   Component Value Date    CREATININE 0 77 2021    EGFR 108 2021     Initial blood pressure elevated, repeat within normal limits  Reviewed importance of medication compliance  Nervous and Auditory    Bilateral carpal tunnel syndrome     Symptoms likely due to carpal tunnel given occupation  Symptoms worse before menses likely due to swelling  Recommended wrist splints  Will assess TSH and B12 to rule out other causes of neuropathy given concurrent numbness in toes             Other    Breast pain in female - Primary     No lumps or masses, pain likely due to fibrocystic changes and cyclic changes   Reassured patient  Reminded her to schedule mammogram            Other Visit Diagnoses     Fibrocystic breast changes, unspecified laterality        Numbness and tingling in both hands        Relevant Orders    TSH, 3rd generation with Free T4 reflex    Vitamin B12    Numbness of toes        Relevant Orders    TSH, 3rd generation with Free T4 reflex    Vitamin B12          SUBJECTIVE:  Chelle Le is a 40 y o  female who presents today with a chief complaint of Numbness (Of hands and right great toe on right foot) and Breast Pain (Bilateral breast pain for a week)  HPI     She reports that she has had breast tenderness for a week  Her LMP was 1/10  Her periods come monthly   She does not do breast exams regularly  She denies feeling masses or lumps  Last mammogram in April 2019 noted extremely dense breasts  She also reports hand numbness bilaterally  She denies any inciting or alleviating factors  It comes and goes  She feels like her hands get heavy  She denies weakness  She repots that the numbess in her hands gets worse prior to her menstrual cycle      She also reports that the big toe on her right foot gets numb  She cannot identify any inciting or alleviating factors  Review of Systems   Cardiovascular: Negative for chest pain and leg swelling  Genitourinary: Negative for menstrual problem  Breast tenderness   Neurological: Positive for numbness  Negative for weakness  I have reviewed the patient's Past Medical History  Current Outpatient Medications:     amLODIPine (NORVASC) 2 5 mg tablet, Take 1 tablet (2 5 mg total) by mouth daily, Disp: 90 tablet, Rfl: 3    Potassium 99 MG TABS, Take 1 tablet by mouth daily, Disp: , Rfl:     OBJECTIVE:  /90 (BP Location: Left arm, Patient Position: Sitting)   Pulse 85   Temp 99 °F (37 2 °C)   Resp 18   Ht 5' (1 524 m)   Wt 59 kg (130 lb)   LMP 01/10/2022   SpO2 99%   Breastfeeding No   BMI 25 39 kg/m²    BP Readings from Last 3 Encounters:   01/28/22 128/90   12/01/21 138/100   11/10/21 124/72      Wt Readings from Last 3 Encounters:   01/28/22 59 kg (130 lb)   11/10/21 57 8 kg (127 lb 8 oz)   09/10/21 56 2 kg (124 lb)      Physical Exam  Constitutional:       General: She is not in acute distress  Appearance: Normal appearance  She is normal weight  She is not ill-appearing or toxic-appearing  HENT:      Head: Normocephalic and atraumatic  Right Ear: External ear normal       Left Ear: External ear normal       Nose: Nose normal       Mouth/Throat:      Mouth: Mucous membranes are moist    Eyes:      Extraocular Movements: Extraocular movements intact        Conjunctiva/sclera: Conjunctivae normal  Pulmonary:      Effort: Pulmonary effort is normal  No respiratory distress  Chest:   Breasts: Breasts are symmetrical       Right: Tenderness (inner quadrant) present  No swelling, bleeding, inverted nipple, mass, nipple discharge, skin change, axillary adenopathy or supraclavicular adenopathy  Left: Tenderness (inner quadrant) present  No swelling, bleeding, inverted nipple, mass, nipple discharge, skin change, axillary adenopathy or supraclavicular adenopathy  Comments: Fibrocystic changes in bilateral breast pronounced in inner quadrants  Musculoskeletal:         General: Normal range of motion  Right wrist: No swelling, deformity or tenderness  Normal range of motion  Normal pulse  Left wrist: No swelling, deformity or tenderness  Normal range of motion  Normal pulse  Right hand: Normal sensation  Left hand: Normal sensation  Cervical back: Normal range of motion and neck supple  Comments: Positive Tinel and Phalen Test on the right   Lymphadenopathy:      Upper Body:      Right upper body: No supraclavicular, axillary or pectoral adenopathy  Left upper body: No supraclavicular, axillary or pectoral adenopathy  Skin:     Findings: No erythema or rash  Neurological:      General: No focal deficit present  Mental Status: She is alert and oriented to person, place, and time  Psychiatric:         Mood and Affect: Mood normal          Behavior: Behavior normal                 Return if symptoms worsen or fail to improve  Sandra Ibrahim MD    Note: Portions of the record have been created with voice recognition software  Occasional wrong word or "sound a like" substitutions may have occurred due to the inherent limitations of voice recognition software  Read the chart carefully and recognize, using context, where substitutions have occurred

## 2022-01-28 ENCOUNTER — OFFICE VISIT (OUTPATIENT)
Dept: FAMILY MEDICINE CLINIC | Facility: CLINIC | Age: 47
End: 2022-01-28
Payer: COMMERCIAL

## 2022-01-28 VITALS
WEIGHT: 130 LBS | SYSTOLIC BLOOD PRESSURE: 128 MMHG | DIASTOLIC BLOOD PRESSURE: 90 MMHG | HEIGHT: 60 IN | BODY MASS INDEX: 25.52 KG/M2 | HEART RATE: 85 BPM | OXYGEN SATURATION: 99 % | RESPIRATION RATE: 18 BRPM | TEMPERATURE: 99 F

## 2022-01-28 DIAGNOSIS — R20.0 NUMBNESS OF TOES: ICD-10-CM

## 2022-01-28 DIAGNOSIS — G56.03 BILATERAL CARPAL TUNNEL SYNDROME: ICD-10-CM

## 2022-01-28 DIAGNOSIS — N64.4 BREAST PAIN IN FEMALE: Primary | ICD-10-CM

## 2022-01-28 DIAGNOSIS — R20.0 NUMBNESS AND TINGLING IN BOTH HANDS: ICD-10-CM

## 2022-01-28 DIAGNOSIS — N60.19 FIBROCYSTIC BREAST CHANGES, UNSPECIFIED LATERALITY: ICD-10-CM

## 2022-01-28 DIAGNOSIS — R20.2 NUMBNESS AND TINGLING IN BOTH HANDS: ICD-10-CM

## 2022-01-28 DIAGNOSIS — I10 BENIGN ESSENTIAL HTN: ICD-10-CM

## 2022-01-28 PROCEDURE — 1036F TOBACCO NON-USER: CPT | Performed by: FAMILY MEDICINE

## 2022-01-28 PROCEDURE — 99214 OFFICE O/P EST MOD 30 MIN: CPT | Performed by: FAMILY MEDICINE

## 2022-01-28 PROCEDURE — 3008F BODY MASS INDEX DOCD: CPT | Performed by: FAMILY MEDICINE

## 2022-01-28 NOTE — ASSESSMENT & PLAN NOTE
Symptoms likely due to carpal tunnel given occupation  Symptoms worse before menses likely due to swelling  Recommended wrist splints   Will assess TSH and B12 to rule out other causes of neuropathy given concurrent numbness in toes

## 2022-01-28 NOTE — PATIENT INSTRUCTIONS
For hand numbness, it is likely carpal tunnel  You can try wrist splints for carpal tunnel to help with these symptoms  You can wear these whenever the symptoms are most bothersome  We should check labs for B12 and Thyroid, please go to the lab to have these done  Try to keep track of when you have the symptoms of numbness in your hands and feet  Carpal Tunnel Syndrome Exercises   AMBULATORY CARE:   What you need to know about carpal tunnel syndrome (CTS) exercises:  CTS exercises can help relieve pain and increase your range of motion  Your healthcare provider or physical therapist can tell you how often to do the exercises  CTS exercises:   · Finger extensions:  Hold your fingers and thumb close together  Keep them straight  Put a rubber band around the outside of your fingers and thumb  Spread your fingers apart  Then slowly bring them together without letting the rubber band fall off  Repeat 40 times  · Wrist flexor stretch:  Hold your arm out straight  Grasp your fingers with your other hand  Slowly bend the fingers back (palm facing away) until you feel a stretch in your wrist  Hold for 10 seconds  Repeat 5 times  · Wrist extensor stretch:  Hold your arm out straight  Grasp your fingers with your other hand  Slowly bend the fingers and hand down (palm facing you) until you feel a stretch on top of your hand  Hold for 10 seconds  Repeat 5 times  · Wrist curls without weights:  Sit in a chair with your forearm resting on your thigh or a table  With your palm facing down, flex your wrist up 3 inches and slowly lower it  Turn your forearm over and repeat with your palm facing up  Do each exercise 20 times  · Shrugs:  Stand with your arms by your side  Lift your shoulders up to your ears and hold for 1 second  Then pull your shoulders back, pinching your shoulder blades together  Hold for 1 second  Then relax your shoulders  Repeat 20 times         © Copyright [employer] 2021 Information is for [employer] use only and may not be sold, redistributed or otherwise used for commercial purposes  All illustrations and images included in CareNotes® are the copyrighted property of A D A M , Inc  or Coleen Neumann  The above information is an  only  It is not intended as medical advice for individual conditions or treatments  Talk to your doctor, nurse or pharmacist before following any medical regimen to see if it is safe and effective for you

## 2022-01-28 NOTE — ASSESSMENT & PLAN NOTE
BP Readings from Last 3 Encounters:   01/28/22 128/90   12/01/21 138/100   11/10/21 124/72     Lab Results   Component Value Date    CREATININE 0 77 12/03/2021    EGFR 108 12/03/2021     Initial blood pressure elevated, repeat within normal limits  Reviewed importance of medication compliance

## 2022-01-28 NOTE — ASSESSMENT & PLAN NOTE
No lumps or masses, pain likely due to fibrocystic changes and cyclic changes   Reassured patient  Reminded her to schedule mammogram

## 2022-04-29 ENCOUNTER — TELEPHONE (OUTPATIENT)
Dept: FAMILY MEDICINE CLINIC | Facility: CLINIC | Age: 47
End: 2022-04-29

## 2022-04-29 NOTE — TELEPHONE ENCOUNTER
Please remind patient that she has pending TSH and B12 labs if she is still having persistent hand numbness

## 2022-05-04 ENCOUNTER — RA CDI HCC (OUTPATIENT)
Dept: OTHER | Facility: HOSPITAL | Age: 47
End: 2022-05-04

## 2022-05-04 NOTE — PROGRESS NOTES
[facility]  coding opportunities          Chart Reviewed number of suggestions sent to Provider: 1   With the beginning of the new year, this is a reminder to address ALL [facility]  (risk adjustment) codes for 2022 as patient scores reset to zero ROSSI    D50 9 Neutropenia ([facility] )    Patients Insurance        Commercial Insurance: [address] [place] [address]

## 2022-05-06 ENCOUNTER — APPOINTMENT (OUTPATIENT)
Dept: LAB | Age: 47
End: 2022-05-06
Payer: COMMERCIAL

## 2022-05-06 DIAGNOSIS — R20.0 NUMBNESS OF TOES: ICD-10-CM

## 2022-05-06 DIAGNOSIS — R20.2 NUMBNESS AND TINGLING IN BOTH HANDS: ICD-10-CM

## 2022-05-06 DIAGNOSIS — R20.0 NUMBNESS AND TINGLING IN BOTH HANDS: ICD-10-CM

## 2022-05-06 LAB
TSH SERPL DL<=0.05 MIU/L-ACNC: 1.37 UIU/ML (ref 0.45–4.5)
VIT B12 SERPL-MCNC: 642 PG/ML (ref 100–900)

## 2022-05-06 PROCEDURE — 82607 VITAMIN B-12: CPT

## 2022-05-06 PROCEDURE — 36415 COLL VENOUS BLD VENIPUNCTURE: CPT

## 2022-05-06 PROCEDURE — 84443 ASSAY THYROID STIM HORMONE: CPT

## 2022-05-10 PROBLEM — N64.4 BREAST PAIN IN FEMALE: Status: RESOLVED | Noted: 2022-01-28 | Resolved: 2022-05-10

## 2022-05-20 ENCOUNTER — RA CDI HCC (OUTPATIENT)
Dept: OTHER | Facility: HOSPITAL | Age: 47
End: 2022-05-20

## 2022-05-20 NOTE — PROGRESS NOTES
[facility]       Chart reviewed, no opportunity found: CHART REVIEWED, NO OPPORTUNITY FOUND        Patients Insurance        Commercial Insurance: [address] [place] [address]

## 2022-06-20 NOTE — PROGRESS NOTES
FAMILY MEDICINE PROGRESS NOTE    Date of Service: 22  Primary Care Provider:   Shaquille Allison MD       Name: Socorro Gaines       : 1975       Age:46 y o  Sex: female      MRN: [id number]      Chief Complaint:Headache (For the last 4 days ) and Neck Pain (Muscle tightness/)       ASSESSMENT and PLAN:  Socorro Gaines is a 40 y o  female with:     Problem List Items Addressed This Visit        Cardiovascular and Mediastinum    Primary hypertension - Primary     BP Readings from Last 3 Encounters:   22 (!) 158/112   22 128/90   21 138/100     Lab Results   Component Value Date    CREATININE 0 77 2021    EGFR 108 2021     Elevated blood pressure reading on multiple checks in the office today, she does not want to increase amlodipine, so will stop and start losartan 50 mg daily   BMP in 4 to 6 weeks, then follow-up  Relevant Medications    losartan (COZAAR) 50 mg tablet    Other Relevant Orders    Basic metabolic panel       Other    Acute non intractable tension-type headache     Headaches likely tension type and related to high blood pressure  Recommended neck stretches  Other Visit Diagnoses     Musculoskeletal neck pain              SUBJECTIVE:  Socorro Gaines is a 40 y o  female who presents today with a chief complaint of Headache (For the last 4 days ) and Neck Pain (Muscle tightness/)  HPI     She reports that she had headache for several days, this was located on the top of her head  She described it as a sharp pain for 1 day, then a more dull pain  The pain came and went during the four days  She also reports having neck pain  She took Tylenol which did help the pain  She denies difficulty sleeping  She reports she did have some blurry vision on the first day of the headache which did resolve  She denies having phono or photophobia  She did have some nausea   She reports that she did have elevated blood pressure at work when she had the headache, 158/80  Her blood pressure has been elevated on multiple checks in the office  She declines to increase amlodipine because it makes her feel flushed  She will not take lisinopril again due to heavy menstrual bleeding  Review of Systems   Eyes: Negative for photophobia and visual disturbance (resolved)  Respiratory: Negative for chest tightness and shortness of breath  Cardiovascular: Negative for chest pain, palpitations and leg swelling  Musculoskeletal: Positive for neck pain  Negative for neck stiffness  Neurological: Positive for headaches (resolved)  Negative for dizziness (with headaches) and light-headedness  Psychiatric/Behavioral: Negative for sleep disturbance  I have reviewed the patient's Past Medical History  Current Outpatient Medications:     losartan (COZAAR) 50 mg tablet, Take 1 tablet (50 mg total) by mouth daily, Disp: 90 tablet, Rfl: 0    Potassium 99 MG TABS, Take 1 tablet by mouth daily (Patient not taking: Reported on 6/21/2022), Disp: , Rfl:     OBJECTIVE:  BP (!) 158/112 (BP Location: Left arm, Patient Position: Sitting, Cuff Size: Standard)   Pulse 93   Temp 98 4 °F (36 9 °C)   Resp 18   Ht 5' (1 524 m)   Wt 59 kg (130 lb)   SpO2 98%   Breastfeeding No   BMI 25 39 kg/m²    BP Readings from Last 3 Encounters:   06/21/22 (!) 158/112   01/28/22 128/90   12/01/21 138/100      Wt Readings from Last 3 Encounters:   06/21/22 59 kg (130 lb)   01/28/22 59 kg (130 lb)   11/10/21 57 8 kg (127 lb 8 oz)      Physical Exam  Constitutional:       General: She is not in acute distress  Appearance: Normal appearance  She is normal weight  She is not ill-appearing or toxic-appearing  HENT:      Head: Normocephalic and atraumatic  Right Ear: External ear normal       Left Ear: External ear normal       Nose: Nose normal       Mouth/Throat:      Mouth: Mucous membranes are moist    Eyes:      Extraocular Movements: Extraocular movements intact  Conjunctiva/sclera: Conjunctivae normal    Neck:     Pulmonary:      Effort: Pulmonary effort is normal  No respiratory distress  Musculoskeletal:         General: Normal range of motion  Cervical back: Normal range of motion and neck supple  No edema, erythema, signs of trauma, rigidity or crepitus  Pain with movement (with left rotation in paraspinal muscles) and muscular tenderness (cervical paraspinal muscle pain) present  No spinous process tenderness  Normal range of motion  Skin:     Findings: No erythema or rash  Neurological:      General: No focal deficit present  Mental Status: She is alert and oriented to person, place, and time  Cranial Nerves: No cranial nerve deficit  Sensory: No sensory deficit  Motor: No weakness  Coordination: Coordination normal       Gait: Gait normal    Psychiatric:         Mood and Affect: Mood normal          Behavior: Behavior normal           Lab Results   Component Value Date    SODIUM 141 12/03/2021    K 3 5 12/03/2021     12/03/2021    CO2 27 12/03/2021    BUN 12 12/03/2021    CREATININE 0 77 12/03/2021    GLUC 85 03/09/2017    CALCIUM 8 7 12/03/2021              Return for follow-up in 4 to 6 weeks , do blood work prior   Tremaine Becker MD    Note: Portions of the record have been created with voice recognition software  Occasional wrong word or "sound a like" substitutions may have occurred due to the inherent limitations of voice recognition software  Read the chart carefully and recognize, using context, where substitutions have occurred

## 2022-06-20 NOTE — ASSESSMENT & PLAN NOTE
BP Readings from Last 3 Encounters:   06/21/22 (!) 158/112   01/28/22 128/90   12/01/21 138/100     Lab Results   Component Value Date    CREATININE 0 77 12/03/2021    EGFR 108 12/03/2021     Elevated blood pressure reading on multiple checks in the office today, she does not want to increase amlodipine, so will stop and start losartan 50 mg daily   BMP in 4 to 6 weeks, then follow-up

## 2022-06-21 ENCOUNTER — OFFICE VISIT (OUTPATIENT)
Dept: FAMILY MEDICINE CLINIC | Facility: CLINIC | Age: 47
End: 2022-06-21
Payer: COMMERCIAL

## 2022-06-21 VITALS
DIASTOLIC BLOOD PRESSURE: 112 MMHG | BODY MASS INDEX: 25.52 KG/M2 | SYSTOLIC BLOOD PRESSURE: 158 MMHG | WEIGHT: 130 LBS | RESPIRATION RATE: 18 BRPM | HEIGHT: 60 IN | TEMPERATURE: 98.4 F | HEART RATE: 93 BPM | OXYGEN SATURATION: 98 %

## 2022-06-21 DIAGNOSIS — M54.2 MUSCULOSKELETAL NECK PAIN: ICD-10-CM

## 2022-06-21 DIAGNOSIS — G44.209 ACUTE NON INTRACTABLE TENSION-TYPE HEADACHE: ICD-10-CM

## 2022-06-21 DIAGNOSIS — I10 PRIMARY HYPERTENSION: Primary | ICD-10-CM

## 2022-06-21 PROCEDURE — 99214 OFFICE O/P EST MOD 30 MIN: CPT | Performed by: FAMILY MEDICINE

## 2022-06-21 PROCEDURE — 3725F SCREEN DEPRESSION PERFORMED: CPT | Performed by: FAMILY MEDICINE

## 2022-06-21 RX ORDER — LOSARTAN POTASSIUM 50 MG/1
50 TABLET ORAL DAILY
Qty: 90 TABLET | Refills: 0 | Status: SHIPPED | OUTPATIENT
Start: 2022-06-21

## 2022-06-27 ENCOUNTER — TELEPHONE (OUTPATIENT)
Dept: FAMILY MEDICINE CLINIC | Facility: CLINIC | Age: 47
End: 2022-06-27

## 2022-06-27 NOTE — TELEPHONE ENCOUNTER
Patient said the morning after starting losartan she woke up with terrible right knee pain  No swelling jsut pain that goes around to the back of the knee  She said she can't stretch it straight out  She denies any trauma to leg  She wants to know if she can get an order for an xray? Or should she be seen for an evaluation

## 2022-06-28 NOTE — PROGRESS NOTES
FAMILY MEDICINE PROGRESS NOTE    Date of Service: 22  Primary Care Provider:   Mable Kimbrough MD       Name: Jayson Rodriguez       : 1975       Age:46 y o  Sex: female      MRN: [id number]      Chief Complaint:Knee Pain (Painful right knee)       ASSESSMENT and PLAN:  Jayson Rodriguez is a 40 y o  female with:     Problem List Items Addressed This Visit    None     Visit Diagnoses     Acute pain of right knee    -  Primary    Synovial cyst of right popliteal space        Patellofemoral pain syndrome of right knee        Colon cancer screening        Relevant Orders    Ambulatory referral for colonoscopy        Knee pain secondary to patella femoral pain syndrome and Baker's cyst  Recommended supportive care with Tylenol, stretches, exercises  Sample of Voltaren gel provided to patient  Consider formal PT if symptoms do not improve  SUBJECTIVE:  Jayson Rodriguez is a 40 y o  female who presents today with a chief complaint of Knee Pain (Painful right knee)  HPI     She reports knee pain started on   She woke up that morning with stiffness in her knee  She denies any proceeding injury or trauma to her knee  The pain in anterior and does radiate to the back  It hurts to be on her knees, as well as with walking  She tried Tylenol which did help the pain  Review of Systems  I have reviewed the patient's Past Medical History      Current Outpatient Medications:     losartan (COZAAR) 50 mg tablet, Take 1 tablet (50 mg total) by mouth daily, Disp: 90 tablet, Rfl: 0    Potassium 99 MG TABS, Take 1 tablet by mouth daily, Disp: , Rfl:     OBJECTIVE:  /92   Pulse 82   Temp (!) 96 6 °F (35 9 °C)   Ht 4' 11 4" (1 509 m)   Wt 59 1 kg (130 lb 3 2 oz)   SpO2 99%   BMI 25 94 kg/m²    BP Readings from Last 3 Encounters:   22 148/92   22 (!) 158/112   22 128/90      Wt Readings from Last 3 Encounters:   22 59 1 kg (130 lb 3 2 oz)   22 59 kg (130 lb)   22 59 kg (130 lb)      Physical Exam  Constitutional:       General: She is not in acute distress  Appearance: Normal appearance  She is normal weight  She is not ill-appearing or toxic-appearing  HENT:      Head: Normocephalic and atraumatic  Right Ear: External ear normal       Left Ear: External ear normal       Nose: Nose normal       Mouth/Throat:      Mouth: Mucous membranes are moist    Eyes:      Extraocular Movements: Extraocular movements intact  Conjunctiva/sclera: Conjunctivae normal    Pulmonary:      Effort: Pulmonary effort is normal  No respiratory distress  Musculoskeletal:         General: Normal range of motion  Cervical back: Normal range of motion and neck supple  Right knee: Bony tenderness (over patella) present  No effusion, erythema or ecchymosis  Normal range of motion  Tenderness present over the patellar tendon  No LCL laxity, MCL laxity, ACL laxity or PCL laxity  Normal alignment, normal meniscus and normal patellar mobility  Instability Tests: Anterior drawer test negative  Posterior drawer test negative  Anterior Lachman test negative  Medial Jung test negative and lateral Jung test negative  Comments: Patella and pattellar tendon tenderness on the right  Baker's cyst posteriorly   Skin:     Findings: No erythema or rash  Neurological:      General: No focal deficit present  Mental Status: She is alert and oriented to person, place, and time  Psychiatric:         Mood and Affect: Mood normal          Behavior: Behavior normal            BMI Counseling: Body mass index is 25 94 kg/m²  The BMI is above normal  Nutrition recommendations include encouraging healthy choices of fruits and vegetables  Exercise recommendations include moderate physical activity 150 minutes/week and strength training exercises  Rationale for BMI follow-up plan is due to patient being overweight or obese           Return if symptoms worsen or fail to improve, for Next scheduled follow up  Denisse Perez MD    Note: Portions of the record have been created with voice recognition software  Occasional wrong word or "sound a like" substitutions may have occurred due to the inherent limitations of voice recognition software  Read the chart carefully and recognize, using context, where substitutions have occurred

## 2022-06-29 ENCOUNTER — OFFICE VISIT (OUTPATIENT)
Dept: FAMILY MEDICINE CLINIC | Facility: CLINIC | Age: 47
End: 2022-06-29
Payer: COMMERCIAL

## 2022-06-29 VITALS
SYSTOLIC BLOOD PRESSURE: 148 MMHG | WEIGHT: 130.2 LBS | HEART RATE: 82 BPM | BODY MASS INDEX: 26.25 KG/M2 | DIASTOLIC BLOOD PRESSURE: 92 MMHG | HEIGHT: 59 IN | TEMPERATURE: 96.6 F | OXYGEN SATURATION: 99 %

## 2022-06-29 DIAGNOSIS — M71.21 SYNOVIAL CYST OF RIGHT POPLITEAL SPACE: ICD-10-CM

## 2022-06-29 DIAGNOSIS — M25.561 ACUTE PAIN OF RIGHT KNEE: Primary | ICD-10-CM

## 2022-06-29 DIAGNOSIS — M22.2X1 PATELLOFEMORAL PAIN SYNDROME OF RIGHT KNEE: ICD-10-CM

## 2022-06-29 DIAGNOSIS — Z12.11 COLON CANCER SCREENING: ICD-10-CM

## 2022-06-29 PROCEDURE — 99213 OFFICE O/P EST LOW 20 MIN: CPT | Performed by: FAMILY MEDICINE

## 2022-06-29 PROCEDURE — 3008F BODY MASS INDEX DOCD: CPT | Performed by: FAMILY MEDICINE

## 2022-06-29 PROCEDURE — 1036F TOBACCO NON-USER: CPT | Performed by: FAMILY MEDICINE

## 2022-07-05 ENCOUNTER — TELEPHONE (OUTPATIENT)
Dept: FAMILY MEDICINE CLINIC | Facility: CLINIC | Age: 47
End: 2022-07-05

## 2022-07-05 NOTE — TELEPHONE ENCOUNTER
Patient is still having high BP reading with dizziness & HA     7/3/22  170/101  7/4/22 180/103  7/5/22 180/110    Losartan is not helping to bring the numbers down  Please advise

## 2022-07-05 NOTE — TELEPHONE ENCOUNTER
I recommend that she increase the losartan to 100 mg daily and continue to monitor her blood pressure   Return to the office if blood pressure does not improve in 5 to 7 days

## 2022-07-06 NOTE — TELEPHONE ENCOUNTER
Tried calling patient, mailbox is full cannot leave a message  Tried calling her work number, phone rang about 20 times

## 2022-07-06 NOTE — TELEPHONE ENCOUNTER
Patient called back & given message she does not want to increase medication because it puts "a lot of pressure on her chest" & she is unable to sleep  She does not like the way it makes her feel  She wants to try a new medication

## 2022-07-06 NOTE — TELEPHONE ENCOUNTER
The chest pressure is more likely caused by the high blood pressure  I do really think that she needs to increase the medication, otherwise I would recommend adding another medication, such as spironolactone at a low dose if she is amenable

## 2022-07-07 NOTE — TELEPHONE ENCOUNTER
Message relayed regarding chest pressure  Message relayed to return to office in 5-7 day if BP not better  Patient states she does not want to take 100mg of losartan and does not want to add anything additional       States she would like to try a different medication instead

## 2022-07-13 ENCOUNTER — TELEPHONE (OUTPATIENT)
Dept: FAMILY MEDICINE CLINIC | Facility: CLINIC | Age: 47
End: 2022-07-13

## 2022-07-13 NOTE — TELEPHONE ENCOUNTER
FYI   Patient wanted to cancel her appointment today    talking her into keeping the appointment because of her BP problems   I also told her if she cancels I have no openings for a while       She called at 8:13am and her appointment is at [address] [place] [address] and did not give us much notice

## 2022-08-10 NOTE — PROGRESS NOTES
FAMILY MEDICINE PROGRESS NOTE    Date of Service: 22  Primary Care Provider:   Cory Crum MD       Name: Jordan Guajardo       : 1975       Age:47 y o  Sex: female      MRN: [id number]      Chief Complaint:Follow-up       ASSESSMENT and PLAN:  Jordan Guajardo is a 56 y o  female with:     Problem List Items Addressed This Visit        Cardiovascular and Mediastinum    Primary hypertension - Primary    Relevant Medications    amLODIPine (NORVASC) 2 5 mg tablet    Other Relevant Orders    Basic metabolic panel      Other Visit Diagnoses     Labile hypertension        Relevant Medications    amLODIPine (NORVASC) 2 5 mg tablet    Other Relevant Orders    Metanephrine, Fractionated Plasma Free          SUBJECTIVE:  Jordan Guajardo is a 56 y o  female who presents today with a chief complaint of Follow-up  HPI     Patient presents today for follow-up of hypertension  Last month she called the office stating that her blood pressure has been very elevated and she was having symptoms of dizziness and headache  Home blood pressure readings reported per patient were 170/101, 180/103, 180/110  She was recommended to increase losartan from  mg daily however patient declined to do so  She had appointment scheduled for 4 weeks ago however cancel this the same day  Today she tells me that she stopped taking losartan when she last called because she was attributed her symptoms that she had with dizziness, feeling of pulling in her chest when she was sleep, as well as feeling like her heart was beating quickly  She also feels like the losartan makes her leg very heavy  She reports that she check her blood pressure at home, has readings of 130s-80s  She wants to go back on amlodipine  Review of Systems   Respiratory: Negative for shortness of breath  Cardiovascular: Negative for chest pain, palpitations and leg swelling  Musculoskeletal: Negative for arthralgias     Neurological: Negative for dizziness, light-headedness and headaches  I have reviewed the patient's Past Medical History  Current Outpatient Medications:     amLODIPine (NORVASC) 2 5 mg tablet, Take 1 tablet (2 5 mg total) by mouth daily, Disp: 90 tablet, Rfl: 1    Potassium 99 MG TABS, Take 1 tablet by mouth daily, Disp: , Rfl:     OBJECTIVE:  /82   Pulse 98   Temp (!) 96 °F (35 6 °C)   Ht 5' (1 524 m)   Wt 58 5 kg (129 lb)   SpO2 99%   BMI 25 19 kg/m²    BP Readings from Last 3 Encounters:   08/11/22 134/82   06/29/22 148/92   06/21/22 (!) 158/112      Wt Readings from Last 3 Encounters:   08/11/22 58 5 kg (129 lb)   06/29/22 59 1 kg (130 lb 3 2 oz)   06/21/22 59 kg (130 lb)      Physical Exam  Constitutional:       General: She is not in acute distress  Appearance: Normal appearance  She is normal weight  She is not ill-appearing or toxic-appearing  HENT:      Head: Normocephalic and atraumatic  Right Ear: External ear normal       Left Ear: External ear normal       Nose: Nose normal       Mouth/Throat:      Mouth: Mucous membranes are moist    Eyes:      Extraocular Movements: Extraocular movements intact  Conjunctiva/sclera: Conjunctivae normal    Cardiovascular:      Rate and Rhythm: Normal rate and regular rhythm  Pulses: Normal pulses  Heart sounds: Normal heart sounds  No murmur heard  No friction rub  No gallop  Pulmonary:      Effort: Pulmonary effort is normal  No respiratory distress  Musculoskeletal:         General: Normal range of motion  Cervical back: Normal range of motion and neck supple  Skin:     Findings: No erythema or rash  Neurological:      General: No focal deficit present  Mental Status: She is alert and oriented to person, place, and time  Psychiatric:         Mood and Affect: Mood normal          Behavior: Behavior normal                 Return in about 3 months (around 11/11/2022) for follow-up blood pressure       Saúl Muñiz MD    Note: Portions of the record have been created with voice recognition software  Occasional wrong word or "sound a like" substitutions may have occurred due to the inherent limitations of voice recognition software  Read the chart carefully and recognize, using context, where substitutions have occurred

## 2022-08-11 ENCOUNTER — OFFICE VISIT (OUTPATIENT)
Dept: FAMILY MEDICINE CLINIC | Facility: CLINIC | Age: 47
End: 2022-08-11
Payer: COMMERCIAL

## 2022-08-11 VITALS
HEIGHT: 60 IN | SYSTOLIC BLOOD PRESSURE: 134 MMHG | OXYGEN SATURATION: 99 % | DIASTOLIC BLOOD PRESSURE: 82 MMHG | BODY MASS INDEX: 25.32 KG/M2 | TEMPERATURE: 96 F | WEIGHT: 129 LBS | HEART RATE: 98 BPM

## 2022-08-11 DIAGNOSIS — R09.89 LABILE HYPERTENSION: ICD-10-CM

## 2022-08-11 DIAGNOSIS — I10 PRIMARY HYPERTENSION: Primary | ICD-10-CM

## 2022-08-11 PROCEDURE — 99213 OFFICE O/P EST LOW 20 MIN: CPT | Performed by: FAMILY MEDICINE

## 2022-08-11 PROCEDURE — 3079F DIAST BP 80-89 MM HG: CPT | Performed by: FAMILY MEDICINE

## 2022-08-11 PROCEDURE — 3075F SYST BP GE 130 - 139MM HG: CPT | Performed by: FAMILY MEDICINE

## 2022-08-11 RX ORDER — AMLODIPINE BESYLATE 2.5 MG/1
2.5 TABLET ORAL DAILY
Qty: 90 TABLET | Refills: 1 | Status: SHIPPED | OUTPATIENT
Start: 2022-08-11

## 2022-08-11 RX ORDER — AMLODIPINE BESYLATE 2.5 MG/1
2.5 TABLET ORAL DAILY
Qty: 30 TABLET | Refills: 5 | Status: SHIPPED | OUTPATIENT
Start: 2022-08-11 | End: 2022-08-11

## 2022-11-09 ENCOUNTER — TELEPHONE (OUTPATIENT)
Dept: GASTROENTEROLOGY | Facility: CLINIC | Age: 47
End: 2022-11-09

## 2022-11-09 ENCOUNTER — PREP FOR PROCEDURE (OUTPATIENT)
Dept: GASTROENTEROLOGY | Facility: CLINIC | Age: 47
End: 2022-11-09

## 2022-11-09 DIAGNOSIS — Z12.11 SCREENING FOR COLON CANCER: Primary | ICD-10-CM

## 2022-11-09 NOTE — TELEPHONE ENCOUNTER
Scheduled date of colonoscopy (as of today):12/14/22  Physician performing colonoscopy:Dr Victor Half  Location of colonoscopy: [facility]  Bowel prep reviewed with patient:Miralax, dulcolax, gatorade  Instructions reviewed with patient by:ethan  Clearances: n/a

## 2022-12-14 ENCOUNTER — ANESTHESIA (OUTPATIENT)
Dept: GASTROENTEROLOGY | Facility: AMBULARY SURGERY CENTER | Age: 47
End: 2022-12-14

## 2022-12-14 ENCOUNTER — HOSPITAL ENCOUNTER (OUTPATIENT)
Dept: GASTROENTEROLOGY | Facility: AMBULARY SURGERY CENTER | Age: 47
Setting detail: OUTPATIENT SURGERY
Discharge: HOME | End: 2022-12-14
Attending: INTERNAL MEDICINE

## 2022-12-14 ENCOUNTER — ANESTHESIA EVENT (OUTPATIENT)
Dept: GASTROENTEROLOGY | Facility: AMBULARY SURGERY CENTER | Age: 47
End: 2022-12-14

## 2022-12-14 VITALS
OXYGEN SATURATION: 98 % | BODY MASS INDEX: 24.74 KG/M2 | TEMPERATURE: 97.1 F | RESPIRATION RATE: 17 BRPM | DIASTOLIC BLOOD PRESSURE: 94 MMHG | HEART RATE: 86 BPM | WEIGHT: 126 LBS | HEIGHT: 60 IN | SYSTOLIC BLOOD PRESSURE: 165 MMHG

## 2022-12-14 DIAGNOSIS — Z12.11 SCREENING FOR COLON CANCER: ICD-10-CM

## 2022-12-14 RX ORDER — LIDOCAINE HYDROCHLORIDE 10 MG/ML
INJECTION, SOLUTION EPIDURAL; INFILTRATION; INTRACAUDAL; PERINEURAL AS NEEDED
Status: DISCONTINUED | OUTPATIENT
Start: 2022-12-14 | End: 2022-12-14

## 2022-12-14 RX ORDER — SODIUM CHLORIDE, SODIUM LACTATE, POTASSIUM CHLORIDE, CALCIUM CHLORIDE 600; 310; 30; 20 MG/100ML; MG/100ML; MG/100ML; MG/100ML
INJECTION, SOLUTION INTRAVENOUS CONTINUOUS PRN
Status: DISCONTINUED | OUTPATIENT
Start: 2022-12-14 | End: 2022-12-14

## 2022-12-14 RX ORDER — PROPOFOL 10 MG/ML
INJECTION, EMULSION INTRAVENOUS AS NEEDED
Status: DISCONTINUED | OUTPATIENT
Start: 2022-12-14 | End: 2022-12-14

## 2022-12-14 RX ADMIN — Medication 40 MG: at 07:38

## 2022-12-14 RX ADMIN — PROPOFOL 20 MG: 10 INJECTION, EMULSION INTRAVENOUS at 07:36

## 2022-12-14 RX ADMIN — PROPOFOL 130 MG: 10 INJECTION, EMULSION INTRAVENOUS at 07:34

## 2022-12-14 RX ADMIN — SODIUM CHLORIDE, SODIUM LACTATE, POTASSIUM CHLORIDE, AND CALCIUM CHLORIDE: .6; .31; .03; .02 INJECTION, SOLUTION INTRAVENOUS at 07:20

## 2022-12-14 RX ADMIN — LIDOCAINE HYDROCHLORIDE 50 MG: 10 INJECTION, SOLUTION EPIDURAL; INFILTRATION; INTRACAUDAL at 07:34

## 2022-12-14 RX ADMIN — PROPOFOL 20 MG: 10 INJECTION, EMULSION INTRAVENOUS at 07:42

## 2022-12-14 RX ADMIN — PROPOFOL 30 MG: 10 INJECTION, EMULSION INTRAVENOUS at 07:40

## 2022-12-14 RX ADMIN — PROPOFOL 30 MG: 10 INJECTION, EMULSION INTRAVENOUS at 07:38

## 2022-12-14 NOTE — H&P
History and Physical - [facility]  Dori Tang 56 y o  female MRN: [id number]    HPI: Dori Tang is a 56y o  year old female who presents with colon cancer screening  Review of Systems    Historical Information   Past Medical History:   Diagnosis Date   • Allergic conjunctivitis     last assessed 5/14/15   • Chest wall contusion     last assessed 16   • Cyst of right breast     last assesed 10/12/15   • Fibroadenoma of right breast     last assessed 16   • Hypertension     last assessed 16   • Syphilis      Past Surgical History:   Procedure Laterality Date   • BREAST BIOPSY Right     2-3 yrs ago   •  SECTION     • TUBAL LIGATION       Social History   Social History     Substance and Sexual Activity   Alcohol Use No     Social History     Substance and Sexual Activity   Drug Use No     Social History     Tobacco Use   Smoking Status Never   Smokeless Tobacco Never     Family History   Problem Relation Age of Onset   • Hypertension Father        Meds/Allergies     (Not in a hospital admission)      No Known Allergies    Objective     /100   Pulse (!) 109   Temp (!) 97 1 °F (36 2 °C) (Temporal)   Resp 20   Ht 5' (1 524 m)   Wt 57 2 kg (126 lb)   LMP 2022 (Exact Date) Comment: Tubal Ligation  SpO2 99%   BMI 24 61 kg/m²       PHYSICAL EXAM    Gen: NAD  CV: RRR  CHEST: Clear  ABD: soft, NT/ND  EXT: no edema  Neuro: AAO      ASSESSMENT/PLAN:  This is a 56y o  year old female here for colon cancer screening         PLAN:   Procedure: colonoscopy

## 2022-12-14 NOTE — ANESTHESIA PREPROCEDURE EVALUATION
Procedure:  COLONOSCOPY    Relevant Problems   CARDIO   (+) Primary hypertension   (+) Pure hypercholesterolemia      NEURO/PSYCH   (+) Acute non intractable tension-type headache        Physical Exam    Airway    Mallampati score: III  TM Distance: >3 FB  Neck ROM: full     Dental   No notable dental hx     Cardiovascular  Cardiovascular exam normal    Pulmonary  Pulmonary exam normal     Other Findings        Anesthesia Plan  ASA Score- 2     Anesthesia Type- IV sedation with anesthesia with ASA Monitors  Additional Monitors:   Airway Plan:           Plan Factors-Exercise tolerance (METS): >4 METS  Chart reviewed  Patient summary reviewed  Patient is not a current smoker  Induction- intravenous  Postoperative Plan-     Informed Consent- Anesthetic plan and risks discussed with patient

## 2022-12-14 NOTE — ANESTHESIA POSTPROCEDURE EVALUATION
Post-Op Assessment Note    CV Status:  Stable  Pain Score: 0    Pain management: adequate     Mental Status:  Awake and alert   Hydration Status:  Stable   PONV Controlled:  None   Airway Patency:  Patent and adequate      Post Op Vitals Reviewed: Yes      Staff: CRNA, Anesthesiologist         No notable events documented      BP   144/90   Temp     Pulse 89   Resp   16   SpO2   100%

## 2023-03-01 ENCOUNTER — TELEPHONE (OUTPATIENT)
Dept: FAMILY MEDICINE CLINIC | Facility: CLINIC | Age: 48
End: 2023-03-01

## 2023-03-01 ENCOUNTER — OFFICE VISIT (OUTPATIENT)
Dept: FAMILY MEDICINE CLINIC | Facility: CLINIC | Age: 48
End: 2023-03-01

## 2023-03-01 VITALS
OXYGEN SATURATION: 100 % | WEIGHT: 132 LBS | BODY MASS INDEX: 25.91 KG/M2 | TEMPERATURE: 97 F | RESPIRATION RATE: 20 BRPM | HEIGHT: 60 IN | HEART RATE: 110 BPM | SYSTOLIC BLOOD PRESSURE: 132 MMHG | DIASTOLIC BLOOD PRESSURE: 78 MMHG

## 2023-03-01 DIAGNOSIS — E78.00 PURE HYPERCHOLESTEROLEMIA: ICD-10-CM

## 2023-03-01 DIAGNOSIS — I10 PRIMARY HYPERTENSION: ICD-10-CM

## 2023-03-01 DIAGNOSIS — F50.89 PICA: ICD-10-CM

## 2023-03-01 DIAGNOSIS — J30.2 SEASONAL ALLERGIES: ICD-10-CM

## 2023-03-01 DIAGNOSIS — R25.2 LEG CRAMPING: ICD-10-CM

## 2023-03-01 DIAGNOSIS — M54.2 ACUTE NECK PAIN: Primary | ICD-10-CM

## 2023-03-01 DIAGNOSIS — J30.2 SEASONAL ALLERGIES: Primary | ICD-10-CM

## 2023-03-01 RX ORDER — LORATADINE 10 MG/1
10 TABLET ORAL DAILY
Qty: 30 TABLET | Refills: 5 | Status: SHIPPED | OUTPATIENT
Start: 2023-03-01 | End: 2023-03-31

## 2023-03-01 RX ORDER — METHOCARBAMOL 500 MG/1
500 TABLET, FILM COATED ORAL
Qty: 30 TABLET | Refills: 0 | Status: SHIPPED | OUTPATIENT
Start: 2023-03-01

## 2023-03-01 NOTE — TELEPHONE ENCOUNTER
Patient said the allergy medication Dr CROSS said he would order was not sent to pharmacy  Can this be sent over?      [facility]

## 2023-03-01 NOTE — ASSESSMENT & PLAN NOTE
Acute neck pain radiating down the left arm  Difficulty sleeping at night because of this  Sensation and strength intact  Limited range of motion to the right and left  Order an x-ray of the cervical spine  We will for patient to physical therapy  Start methocarbamol 500 mg at bedtime as needed  Continue with ice 20 minutes 4-5 times daily

## 2023-03-01 NOTE — PROGRESS NOTES
Name: Jailene Reddy      : 1975      MRN: [id number]  Encounter Provider: Florentino Gottron, MD  Encounter Date: 3/1/2023   Encounter department: [address] [place] [address]     1  Acute neck pain  Assessment & Plan:  Acute neck pain radiating down the left arm  Difficulty sleeping at night because of this  Sensation and strength intact  Limited range of motion to the right and left  Order an x-ray of the cervical spine  We will for patient to physical therapy  Start methocarbamol 500 mg at bedtime as needed  Continue with ice 20 minutes 4-5 times daily  Orders:  -     XR spine cervical complete 4 or 5 vw non injury; Future; Expected date: 2023  -     methocarbamol (ROBAXIN) 500 mg tablet; Take 1 tablet (500 mg total) by mouth daily at bedtime as needed for muscle spasms  -     Ambulatory Referral to Physical Therapy; Future    2  Leg cramping  -     Iron Panel (Includes Ferritin, Iron Sat%, Iron, and TIBC); Future  -     CBC and Platelet; Future  -     Basic metabolic panel; Future    3  Pure hypercholesterolemia  -     Lipid panel; Future    4  Pica  Assessment & Plan:  Craving and eating ice  Will obtain iron panel    Orders:  -     Iron Panel (Includes Ferritin, Iron Sat%, Iron, and TIBC); Future    5  Seasonal allergies  Assessment & Plan:  Continue Flonase and loratadine daily  6  Primary hypertension  Assessment & Plan:  Blood pressure currently stable  Patient discontinued amlodipine  Continue to monitor  Obtain blood work above  Patient will follow-up in 1 month for annual physical       Subjective      Patient presents with:  Neck Pain: Patient complained of pain in her left shoulder, pain goes down to fingers, tingling sensation, she is also experiencing neck pain, head feels heavy, glare on her left eye, runny nose patient stated that it feels like water, x2 weeks    Amenorrhea: Em seen period for February        Review of Systems Constitutional: Negative for fatigue and fever  Urge to eat ice      HENT: Positive for rhinorrhea  Negative for sore throat  Eyes: Positive for discharge  Negative for visual disturbance  Respiratory: Negative for cough, chest tightness and shortness of breath  Cardiovascular: Negative for chest pain, palpitations and leg swelling  Gastrointestinal: Negative for abdominal pain, constipation, diarrhea and nausea  Endocrine: Negative for cold intolerance and heat intolerance  Genitourinary: Negative for flank pain  Musculoskeletal: Positive for neck pain (goes into left arm)  Negative for back pain  Leg cramping     Skin: Negative for rash  Neurological: Negative for headaches  Psychiatric/Behavioral: Negative for behavioral problems and confusion  Current Outpatient Medications on File Prior to Visit   Medication Sig   • [DISCONTINUED] amLODIPine (NORVASC) 2 5 mg tablet Take 1 tablet (2 5 mg total) by mouth daily   • [DISCONTINUED] Potassium 99 MG TABS Take 1 tablet by mouth daily       Objective     /78 (BP Location: Left arm, Patient Position: Sitting, Cuff Size: Standard)   Pulse (!) 110   Temp (!) 97 °F (36 1 °C) (Temporal)   Resp 20   Ht 5' (1 524 m)   Wt 59 9 kg (132 lb)   SpO2 100%   BMI 25 78 kg/m²     Physical Exam  Vitals and nursing note reviewed  Constitutional:       Appearance: Normal appearance  She is well-developed  HENT:      Head: Normocephalic and atraumatic  Nose: Rhinorrhea present  Cardiovascular:      Rate and Rhythm: Normal rate and regular rhythm  Pulmonary:      Effort: Pulmonary effort is normal       Breath sounds: Normal breath sounds  Musculoskeletal:      Comments: Neck: Limited ROM, muscle tenderness  Sensation intact  Neurological:      General: No focal deficit present  Mental Status: She is alert     Psychiatric:         Mood and Affect: Mood normal          Behavior: Behavior normal        Manny CROSS Piper Sotelo MD

## 2023-03-03 ENCOUNTER — APPOINTMENT (OUTPATIENT)
Dept: RADIOLOGY | Age: 48
End: 2023-03-03

## 2023-03-03 ENCOUNTER — APPOINTMENT (OUTPATIENT)
Dept: LAB | Age: 48
End: 2023-03-03

## 2023-03-03 DIAGNOSIS — E61.1 IRON DEFICIENCY: Primary | ICD-10-CM

## 2023-03-03 DIAGNOSIS — E87.6 HYPOKALEMIA: ICD-10-CM

## 2023-03-03 DIAGNOSIS — E78.00 PURE HYPERCHOLESTEROLEMIA: ICD-10-CM

## 2023-03-03 DIAGNOSIS — M54.2 ACUTE NECK PAIN: ICD-10-CM

## 2023-03-03 DIAGNOSIS — F50.89 PICA: ICD-10-CM

## 2023-03-03 DIAGNOSIS — R25.2 LEG CRAMPING: ICD-10-CM

## 2023-03-03 LAB
ANION GAP SERPL CALCULATED.3IONS-SCNC: 2 MMOL/L (ref 4–13)
BUN SERPL-MCNC: 12 MG/DL (ref 5–25)
CALCIUM SERPL-MCNC: 9.5 MG/DL (ref 8.3–10.1)
CHLORIDE SERPL-SCNC: 105 MMOL/L (ref 96–108)
CHOLEST SERPL-MCNC: 232 MG/DL
CO2 SERPL-SCNC: 31 MMOL/L (ref 21–32)
CREAT SERPL-MCNC: 0.77 MG/DL (ref 0.6–1.3)
ERYTHROCYTE [DISTWIDTH] IN BLOOD BY AUTOMATED COUNT: 17.8 % (ref 11.6–15.1)
FERRITIN SERPL-MCNC: 9 NG/ML (ref 8–388)
GFR SERPL CREATININE-BSD FRML MDRD: 92 ML/MIN/1.73SQ M
GLUCOSE P FAST SERPL-MCNC: 101 MG/DL (ref 65–99)
HCT VFR BLD AUTO: 40 % (ref 34.8–46.1)
HDLC SERPL-MCNC: 94 MG/DL
HGB BLD-MCNC: 11.2 G/DL (ref 11.5–15.4)
IRON SATN MFR SERPL: 9 % (ref 15–50)
IRON SERPL-MCNC: 40 UG/DL (ref 50–170)
LDLC SERPL CALC-MCNC: 128 MG/DL (ref 0–100)
MCH RBC QN AUTO: 20.2 PG (ref 26.8–34.3)
MCHC RBC AUTO-ENTMCNC: 28 G/DL (ref 31.4–37.4)
MCV RBC AUTO: 72 FL (ref 82–98)
NONHDLC SERPL-MCNC: 138 MG/DL
PLATELET # BLD AUTO: 216 THOUSANDS/UL (ref 149–390)
POTASSIUM SERPL-SCNC: 3.1 MMOL/L (ref 3.5–5.3)
RBC # BLD AUTO: 5.54 MILLION/UL (ref 3.81–5.12)
SODIUM SERPL-SCNC: 138 MMOL/L (ref 135–147)
TIBC SERPL-MCNC: 466 UG/DL (ref 250–450)
TRIGL SERPL-MCNC: 48 MG/DL
WBC # BLD AUTO: 3.09 THOUSAND/UL (ref 4.31–10.16)

## 2023-03-03 RX ORDER — FERROUS SULFATE TAB EC 324 MG (65 MG FE EQUIVALENT) 324 (65 FE) MG
324 TABLET DELAYED RESPONSE ORAL DAILY
Qty: 30 TABLET | Refills: 2 | Status: SHIPPED | OUTPATIENT
Start: 2023-03-03 | End: 2023-04-02

## 2023-03-03 RX ORDER — MULTIVIT WITH MINERALS/LUTEIN
1000 TABLET ORAL DAILY
Qty: 30 TABLET | Refills: 2 | Status: SHIPPED | OUTPATIENT
Start: 2023-03-03 | End: 2023-04-02

## 2023-03-03 RX ORDER — POTASSIUM CHLORIDE 20 MEQ/1
20 TABLET, EXTENDED RELEASE ORAL 2 TIMES DAILY
Qty: 2 TABLET | Refills: 0 | Status: SHIPPED | OUTPATIENT
Start: 2023-03-03

## 2023-03-08 ENCOUNTER — TELEPHONE (OUTPATIENT)
Dept: FAMILY MEDICINE CLINIC | Facility: CLINIC | Age: 48
End: 2023-03-08

## 2023-03-09 NOTE — TELEPHONE ENCOUNTER
Spoke with patient, gave message  She had questions about the xray and PT  After speaking with Dr Mik Vang he stated the xray was ok and he would like her to try Pt for her ongoing neck pain  Order was placed already  When she return call, please give her the rest of the message

## 2023-03-13 DIAGNOSIS — E87.6 HYPOKALEMIA: ICD-10-CM

## 2023-03-13 RX ORDER — POTASSIUM CHLORIDE 20 MEQ/1
20 TABLET, EXTENDED RELEASE ORAL 2 TIMES DAILY
Qty: 30 TABLET | Refills: 0 | Status: SHIPPED | OUTPATIENT
Start: 2023-03-13
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
## 2023-03-16 ENCOUNTER — EVALUATION (OUTPATIENT)
Dept: PHYSICAL THERAPY | Facility: CLINIC | Age: 48
End: 2023-03-16

## 2023-03-16 DIAGNOSIS — M54.12 CERVICAL RADICULOPATHY: ICD-10-CM

## 2023-03-16 DIAGNOSIS — M54.2 ACUTE NECK PAIN: Primary | ICD-10-CM

## 2023-03-23 ENCOUNTER — OFFICE VISIT (OUTPATIENT)
Dept: PHYSICAL THERAPY | Facility: CLINIC | Age: 48
End: 2023-03-23

## 2023-03-23 DIAGNOSIS — M54.2 ACUTE NECK PAIN: Primary | ICD-10-CM

## 2023-03-23 DIAGNOSIS — M54.12 CERVICAL RADICULOPATHY: ICD-10-CM

## 2023-03-23 NOTE — PROGRESS NOTES
Daily Note     Today's date: 3/23/2023  Patient name: Iris Posada  : 1975  MRN: [id number]  Referring provider: Edwige Leach  Dx:   Encounter Diagnosis     ICD-10-CM    1  Acute neck pain  M54 2       2  Cervical radiculopathy  M54 12                      Subjective: Patient reports her pain is better in her neck  Not as bad as it was last week  She is performing HEP without difficulty  She notes she is still getting a lot of numbness into her fingers bilaterally L>R hand  She notes the numbness into hands is worse at around 3am and feel heavy and burning  She reports deep burning pain into b/l arms "inside her arms" from shoulders down worse L >R  Objective: See treatment diary below      Assessment: Tolerated treatment fair  Patient would benefit from continued PT  She reports sig tenderness with all manuals, especially to occiput and UT (worse on right)  Pt has difficulty explaining where pain is felt and what is bothersome/painful  She moves through exercises with cueing needed for form and carryover  Updated HEP  Plan: Progress treatment as tolerated         Precautions: None       Manuals 3/16 3/23           C/s stretching 3' 5'           UT/levator STM 3' 5'           Sub occipital release 3' 3'           Gentle scalene STM and passive stretching  On R NV 5'           Neuro Re-Ed             Chin tucks NV NV           Sustained c/s head lifts             TB rows NV R TB x1           TB extensions  NV R TB  x15           TB horizontal abd NV R TB x15           Door pec str  20"X3                        Ther Activity             C/S towel Rotation  10x5"             C/s towel extensions  10x5"            Seated t/s extensions over roll NV 10x5"            C/s AROM rotation 10x bl slowly                                                                Ther Ex             Db shoulder flexion             DB shoulder abd              UT stretch NV            Levator stretch  NV Door pec stretch NV            Modalities

## 2023-04-14 PROBLEM — M54.12 CERVICAL RADICULOPATHY: Status: ACTIVE | Noted: 2023-04-14

## 2023-04-14 PROBLEM — D50.8 IRON DEFICIENCY ANEMIA SECONDARY TO INADEQUATE DIETARY IRON INTAKE: Status: ACTIVE | Noted: 2023-04-14

## 2023-04-14 PROBLEM — G56.02 CARPAL TUNNEL SYNDROME OF LEFT WRIST: Status: ACTIVE | Noted: 2022-01-28

## 2023-05-04 NOTE — PROGRESS NOTES
FAMILY MEDICINE PROGRESS NOTE    Date of Service: 23  Primary Care Provider:   Darrian Alberts MD       Name: Bryant Sanders       : 1975       Age:47 y o  Sex: female      MRN: [id number]      Chief Complaint:Follow-up (Follow up on ER visit 23 for Both hands becoming numbness & tingling , but left hand is worse  Neck pain  )       ASSESSMENT and PLAN:  Bryant Sanders is a 56 y o  female with:     Problem List Items Addressed This Visit        Cardiovascular and Mediastinum    Severe uncontrolled hypertension - Primary     BP Readings from Last 3 Encounters:   23 (!) 190/118   23 (!) 187/115   23 (!) 196/102     She did not continue labteolol due to concerns for side effects  Discussed that side effects were more likely due to oxycodone  She has had adverse effects with a number of medications including amlodipine, lisinopril, cannot use thiazides due to hypokalemia  Will trial nifedipine, recommended waiting two weeks if she has any adverse effects since most are likely to resolve in two weeks     Follow-up in two weeks, reviewed return precautions         Relevant Medications    NIFEdipine (PROCARDIA XL) 30 mg 24 hr tablet       Nervous and Auditory    Carpal tunnel syndrome of left wrist     Reviewed wrist splint options on [employer]          Cervical radiculopathy       Other    Hypokalemia     Lab Results   Component Value Date    K 3 4 (L) 2023     Restart daily supplement         Relevant Medications    potassium chloride (K-DUR,KLOR-CON) 20 mEq tablet    Acute neck pain    Iron deficiency anemia secondary to inadequate dietary iron intake     Lab Results   Component Value Date    WBC 4 62 2023    HGB 12 4 2023    HCT 42 0 2023    MCV 74 (L) 2023     2023     Lab Results   Component Value Date    IRON 40 (L) 2023    TIBC 466 (H) 2023    FERRITIN 9 2023     Continue iron supplement          Relevant Medications ferrous sulfate 324 (65 Fe) mg   Other Visit Diagnoses     Iron deficiency        Relevant Medications    ferrous sulfate 324 (65 Fe) mg          SUBJECTIVE:  Teri Rodriguez is a 56 y o  female who presents today with a chief complaint of Follow-up (Follow up on ER visit 4/24/23 for Both hands becoming numbness & tingling , but left hand is worse  Neck pain  )  HPI     Patient was seen three weeks ago with complaint of left arm pain, neck pain  Her blood pressure was significantly elevated in the office and given complaints of arm pain in conjunction with high blood pressure she was sent to the Er for further evaluation  Cardiac work-up was unremarkable  Shoulder Xray within normal limits  She was given prescription for labetalol for blood pressure, oxycodone for neck pain  She is not taking the labetolol, she reports that when she got home from the hospital she had episode of nausea, dizziness which she attributed to the labetolol however she was also taking oxycodone at the time  Her neck pain has improved  She still has some numbness in her both hands, mostly on the left  Review of Systems   Eyes: Negative for visual disturbance  Respiratory: Negative for shortness of breath  Cardiovascular: Negative for chest pain  Musculoskeletal: Negative for neck pain  Neurological: Positive for numbness  Negative for dizziness, light-headedness and headaches  I have reviewed the patient's Past Medical History      Current Outpatient Medications:     ferrous sulfate 324 (65 Fe) mg, Take 1 tablet (324 mg total) by mouth in the morning for 90 doses, Disp: 90 tablet, Rfl: 3    methocarbamol (ROBAXIN) 500 mg tablet, Take 1 tablet (500 mg total) by mouth daily at bedtime as needed for muscle spasms, Disp: 30 tablet, Rfl: 0    NIFEdipine (PROCARDIA XL) 30 mg 24 hr tablet, Take 1 tablet (30 mg total) by mouth daily, Disp: 90 tablet, Rfl: 3    potassium chloride (K-DUR,KLOR-CON) 20 mEq tablet, Take 1 tablet (20 mEq total) by mouth daily, Disp: 90 tablet, Rfl: 3    Ascorbic Acid (vitamin C) 1000 MG tablet, Take 1 tablet (1,000 mg total) by mouth daily, Disp: 30 tablet, Rfl: 2    loratadine (CLARITIN) 10 mg tablet, Take 1 tablet (10 mg total) by mouth daily, Disp: 30 tablet, Rfl: 5    OBJECTIVE:  BP (!) 190/118 (BP Location: Right arm, Patient Position: Sitting, Cuff Size: Standard)   Pulse (!) 108   Temp (!) 96 9 °F (36 1 °C)   Resp 20   Ht 5' (1 524 m)   Wt 59 kg (130 lb)   SpO2 99%   BMI 25 39 kg/m²    BP Readings from Last 3 Encounters:   05/05/23 (!) 190/118   04/14/23 (!) 187/115   04/14/23 (!) 196/102      Wt Readings from Last 3 Encounters:   05/05/23 59 kg (130 lb)   04/14/23 58 1 kg (128 lb)   03/01/23 59 9 kg (132 lb)      Physical Exam  Constitutional:       General: She is not in acute distress  Appearance: Normal appearance  She is normal weight  She is not ill-appearing or toxic-appearing  HENT:      Head: Normocephalic and atraumatic  Right Ear: External ear normal       Left Ear: External ear normal       Nose: Nose normal       Mouth/Throat:      Mouth: Mucous membranes are moist    Eyes:      Extraocular Movements: Extraocular movements intact  Conjunctiva/sclera: Conjunctivae normal    Cardiovascular:      Rate and Rhythm: Normal rate and regular rhythm  Pulses: Normal pulses  Heart sounds: Normal heart sounds  No murmur heard  No gallop  Pulmonary:      Effort: Pulmonary effort is normal  No respiratory distress  Breath sounds: Normal breath sounds  No stridor  No wheezing, rhonchi or rales  Musculoskeletal:         General: Normal range of motion  Cervical back: Normal range of motion and neck supple  Right lower leg: No edema  Left lower leg: No edema  Skin:     Findings: No erythema or rash  Neurological:      General: No focal deficit present  Mental Status: She is alert and oriented to person, place, and time     Psychiatric: "       Mood and Affect: Mood normal          Behavior: Behavior normal          Lab Results   Component Value Date    WBC 4 62 04/14/2023    HGB 12 4 04/14/2023    HCT 42 0 04/14/2023    MCV 74 (L) 04/14/2023     04/14/2023     Lab Results   Component Value Date    SODIUM 138 04/14/2023    K 3 4 (L) 04/14/2023     04/14/2023    CO2 28 04/14/2023    BUN 14 04/14/2023    CREATININE 0 98 04/14/2023    GLUC 87 04/14/2023    CALCIUM 9 6 04/14/2023     Lab Results   Component Value Date    ALT 26 10/20/2021    AST 20 10/20/2021    ALKPHOS 65 10/20/2021    BILITOT 0 59 12/16/2015              Return in about 2 weeks (around 5/19/2023) for Annual physical     Dolores Castillo MD    Note: Portions of the record have been created with voice recognition software  Occasional wrong word or \"sound a like\" substitutions may have occurred due to the inherent limitations of voice recognition software  Read the chart carefully and recognize, using context, where substitutions have occurred    "

## 2023-05-05 ENCOUNTER — OFFICE VISIT (OUTPATIENT)
Dept: FAMILY MEDICINE CLINIC | Facility: CLINIC | Age: 48
End: 2023-05-05

## 2023-05-05 VITALS
DIASTOLIC BLOOD PRESSURE: 118 MMHG | BODY MASS INDEX: 25.52 KG/M2 | SYSTOLIC BLOOD PRESSURE: 190 MMHG | OXYGEN SATURATION: 99 % | TEMPERATURE: 96.9 F | HEART RATE: 108 BPM | RESPIRATION RATE: 20 BRPM | HEIGHT: 60 IN | WEIGHT: 130 LBS

## 2023-05-05 DIAGNOSIS — M54.12 CERVICAL RADICULOPATHY: ICD-10-CM

## 2023-05-05 DIAGNOSIS — E87.6 HYPOKALEMIA: ICD-10-CM

## 2023-05-05 DIAGNOSIS — M54.2 ACUTE NECK PAIN: ICD-10-CM

## 2023-05-05 DIAGNOSIS — I10 SEVERE UNCONTROLLED HYPERTENSION: ICD-10-CM

## 2023-05-05 DIAGNOSIS — D50.8 IRON DEFICIENCY ANEMIA SECONDARY TO INADEQUATE DIETARY IRON INTAKE: ICD-10-CM

## 2023-05-05 DIAGNOSIS — G56.02 CARPAL TUNNEL SYNDROME OF LEFT WRIST: ICD-10-CM

## 2023-05-05 DIAGNOSIS — E61.1 IRON DEFICIENCY: ICD-10-CM

## 2023-05-05 DIAGNOSIS — I10 PRIMARY HYPERTENSION: Primary | ICD-10-CM

## 2023-05-05 RX ORDER — FERROUS SULFATE TAB EC 324 MG (65 MG FE EQUIVALENT) 324 (65 FE) MG
324 TABLET DELAYED RESPONSE ORAL DAILY
Qty: 90 TABLET | Refills: 3 | Status: SHIPPED | OUTPATIENT
Start: 2023-05-05 | End: 2023-08-03

## 2023-05-05 RX ORDER — POTASSIUM CHLORIDE 20 MEQ/1
20 TABLET, EXTENDED RELEASE ORAL DAILY
Qty: 90 TABLET | Refills: 3 | Status: SHIPPED | OUTPATIENT
Start: 2023-05-05

## 2023-05-05 RX ORDER — NIFEDIPINE 30 MG/1
30 TABLET, EXTENDED RELEASE ORAL DAILY
Qty: 90 TABLET | Refills: 3 | Status: SHIPPED | OUTPATIENT
Start: 2023-05-05 | End: 2023-11-01

## 2023-05-05 NOTE — ASSESSMENT & PLAN NOTE
BP Readings from Last 3 Encounters:   05/05/23 (!) 190/118   04/14/23 (!) 187/115   04/14/23 (!) 196/102     She did not continue labteolol due to concerns for side effects  Discussed that side effects were more likely due to oxycodone  She has had adverse effects with a number of medications including amlodipine, lisinopril, cannot use thiazides due to hypokalemia  Will trial nifedipine, recommended waiting two weeks if she has any adverse effects since most are likely to resolve in two weeks     Follow-up in two weeks, reviewed return precautions

## 2023-05-05 NOTE — ASSESSMENT & PLAN NOTE
Lab Results   Component Value Date    WBC 4 62 04/14/2023    HGB 12 4 04/14/2023    HCT 42 0 04/14/2023    MCV 74 (L) 04/14/2023     04/14/2023     Lab Results   Component Value Date    IRON 40 (L) 03/03/2023    TIBC 466 (H) 03/03/2023    FERRITIN 9 03/03/2023     Continue iron supplement

## 2023-05-18 NOTE — PROGRESS NOTES
[facility]      Date of Service: 23  Primary Care Provider:   iDanna Valderrama MD       Name: Jory Gunderson       : 1975       Age:47 y o  Sex: female      MRN: [id number]      Chief Complaint:Follow-up and Hypertension     Assessment and Plan:  56 y o  female exam      1  Health Maintenance  - Colon Cancer Screening: colonoscopy in 2022, 10 year recall   - Cervical Cancer Screening: last PAP in 2019, repeat in   - Breast Cancer Screening: last mammogram May 11, 2023  - Labs: has had recent labs  - Immunizations: Reviewed  Recommend yearly flu vaccine      2  Other diagnoses addressed today:   Problem List Items Addressed This Visit        Cardiovascular and Mediastinum    Primary hypertension     BP Readings from Last 3 Encounters:   23 128/82   23 (!) 190/118   23 (!) 187/115     Controlled today, off of medications  Will continue off of medications, but recommended ongoing monitoring  Discussed that she may benefit from being on a medication         Relevant Medications    Blood Pressure Monitoring (Blood Pressure Cuff) MISC       Other    Hypokalemia     Lab Results   Component Value Date    K 3 4 (L) 2023     continue daily supplement         Relevant Orders    Comprehensive metabolic panel    Pure hypercholesterolemia    Iron deficiency anemia secondary to inadequate dietary iron intake     Lab Results   Component Value Date    WBC 4 62 2023    HGB 12 4 2023    HCT 42 0 2023    MCV 74 (L) 2023     2023     Lab Results   Component Value Date    IRON 40 (L) 2023    TIBC 466 (H) 2023    FERRITIN 9 2023     Continue iron supplement          Relevant Orders    CBC and differential    Iron Panel (Includes Ferritin, Iron Sat%, Iron, and TIBC)   Other Visit Diagnoses     Annual physical exam    -  Primary    Dense breast tissue on mammogram        Relevant Orders    US breast screening bilateral complete (ABUS)           Immunizations and preventive care screenings were discussed with patient today  Appropriate education was printed on patient's after visit summary  Counseling:  Alcohol/drug use: discussed moderation in alcohol intake, the recommendations for healthy alcohol use, and avoidance of illicit drug use  Dental Health: discussed importance of regular tooth brushing, flossing, and dental visits  Injury prevention: discussed safety/seat belts, safety helmets, smoke detectors, carbon dioxide detectors, and smoking near bedding or upholstery  Exercise: the importance of regular exercise/physical activity was discussed  Recommend exercise 3-5 times per week for at least 30 minutes  Follow up next physical in 1 year  Subjective:    Juan R Beal is a 56 y o  female and is here for a comprehensive physical exam      Acute Complaints:    Patient presents today for follow-up, she was seen multiple times in the last several weeks due to multiple symptoms but most concerning uncontrolled hypertension  She was started on nifedipine 30 mg at her last visit on May 5, however she did not take the medication  She instead has been drinking fresh tomato juice which she believes to be a natural remedy for high blood pressure  She dnie chest pain, shortness of breath, headaches, vision changes  Her neck pain has improved  She still has numbness in her hand  Diet and Physical Activity  Diet/Nutrition: does follow a well balanced diet  Exercise: no formal exercise  General Health  Vision: no vision problems and goes for regular eye exams  Dental: regular dental visits        Gyn Health:    OB History        6    Para   4    Term   4            AB   2    Living           SAB   2    IAB        Ectopic        Multiple        Live Births               Obstetric Comments   Depo-provera for contraception  Oral contraception use  Age 26              No LMP recorded (lmp unknown)  (Menstrual status: Amenorrheic other)   LMP February   History of abnormal pap smears  No    Histories Updated and Reviewed 5/19/2023:  Patient's Medications   New Prescriptions    BLOOD PRESSURE MONITORING (BLOOD PRESSURE CUFF) MISC    Use in the morning   Previous Medications    ASCORBIC ACID (VITAMIN C) 1000 MG TABLET    Take 1 tablet (1,000 mg total) by mouth daily    FERROUS SULFATE 324 (65 FE) MG    Take 1 tablet (324 mg total) by mouth in the morning for 90 doses    METHOCARBAMOL (ROBAXIN) 500 MG TABLET    Take 1 tablet (500 mg total) by mouth daily at bedtime as needed for muscle spasms    POTASSIUM CHLORIDE (K-DUR,KLOR-CON) 20 MEQ TABLET    Take 1 tablet (20 mEq total) by mouth daily   Modified Medications    No medications on file   Discontinued Medications    LORATADINE (CLARITIN) 10 MG TABLET    Take 1 tablet (10 mg total) by mouth daily    NIFEDIPINE (PROCARDIA XL) 30 MG 24 HR TABLET    Take 1 tablet (30 mg total) by mouth daily     No Known Allergies  Past Medical History:   Diagnosis Date   • Allergic conjunctivitis     last assessed 5/14/15   • Chest wall contusion     last assessed 11/1/16   • Cyst of right breast     last assesed 10/12/15   • Fibroadenoma of right breast     last assessed 7/21/16   • Hypertension     last assessed 7/12/16   • Syphilis      Social History     Socioeconomic History   • Marital status: Single     Spouse name: Not on file   • Number of children: Not on file   • Years of education: Not on file   • Highest education level: Not on file   Occupational History   • Not on file   Tobacco Use   • Smoking status: Never   • Smokeless tobacco: Never   Vaping Use   • Vaping Use: Never used   Substance and Sexual Activity   • Alcohol use: No   • Drug use: No   • Sexual activity: Not Currently     Partners: Male     Birth control/protection: Pill, Injection   Other Topics Concern   • Not on file   Social History Narrative   • Not on file     Social Determinants of Health     Financial Resource Strain: Not on file   Food Insecurity: Not on file   Transportation Needs: Not on file   Physical Activity: Not on file   Stress: Not on file   Social Connections: Not on file   Intimate Partner Violence: Not on file   Housing Stability: Not on file     Immunization History   Administered Date(s) Administered   • INFLUENZA 10/04/2018, 10/10/2019   • Influenza, injectable, quadrivalent, preservative free 0 5 mL 11/10/2021   • Influenza, seasonal, injectable 10/01/2015       Objective:  /82 (BP Location: Left arm, Patient Position: Sitting, Cuff Size: Standard)   Pulse 84   Temp 97 5 °F (36 4 °C) (Tympanic)   Resp 18   Ht 5' (1 524 m)   Wt 58 1 kg (128 lb)   LMP  (LMP Unknown) Comment: Patient last menstrual cycle was February 2023  SpO2 98%   BMI 25 00 kg/m²   BP Readings from Last 3 Encounters:   05/19/23 128/82   05/05/23 (!) 190/118   04/14/23 (!) 187/115      Wt Readings from Last 3 Encounters:   05/19/23 58 1 kg (128 lb)   05/05/23 59 kg (130 lb)   04/14/23 58 1 kg (128 lb)      Physical Exam  Constitutional:       General: She is not in acute distress  Appearance: Normal appearance  She is normal weight  She is not ill-appearing or toxic-appearing  HENT:      Head: Normocephalic and atraumatic  Right Ear: External ear normal       Left Ear: External ear normal       Nose: Nose normal       Mouth/Throat:      Mouth: Mucous membranes are moist    Eyes:      Extraocular Movements: Extraocular movements intact  Conjunctiva/sclera: Conjunctivae normal    Cardiovascular:      Rate and Rhythm: Normal rate and regular rhythm  Pulses: Normal pulses  Heart sounds: Normal heart sounds  No murmur heard  No friction rub  No gallop  Pulmonary:      Effort: Pulmonary effort is normal  No respiratory distress  Breath sounds: Normal breath sounds  No stridor  No wheezing, rhonchi or rales     Abdominal:      General: There is no "distension  Palpations: Abdomen is soft  Tenderness: There is no abdominal tenderness  Musculoskeletal:         General: Normal range of motion  Cervical back: Normal range of motion and neck supple  No rigidity  Right lower leg: No edema  Left lower leg: No edema  Lymphadenopathy:      Cervical: No cervical adenopathy  Skin:     General: Skin is warm and dry  Findings: No erythema or rash  Neurological:      General: No focal deficit present  Mental Status: She is alert and oriented to person, place, and time  Psychiatric:         Mood and Affect: Mood normal          Behavior: Behavior normal          Patient Care Team:  Yesenia Rodriguez MD as PCP - General (Family Medicine)  Elvia Miles DO as PCP - [address] [place] [address] (RTE)  Mandie Bermudez MD as PCP - PCP-[employer] (RTE)  ANTOINE Mueller MD Patty Decant, MD Hanley Nixon, MD    Note: Portions of the record may have been created with voice recognition software  Occasional wrong word or \"sound a like\" substitutions may have occurred due to the inherent limitations of voice recognition software  Read the chart carefully and recognize, using context, where substitutions have occurred    "

## 2023-05-19 ENCOUNTER — OFFICE VISIT (OUTPATIENT)
Dept: FAMILY MEDICINE CLINIC | Facility: CLINIC | Age: 48
End: 2023-05-19

## 2023-05-19 VITALS
HEART RATE: 84 BPM | DIASTOLIC BLOOD PRESSURE: 82 MMHG | SYSTOLIC BLOOD PRESSURE: 128 MMHG | OXYGEN SATURATION: 98 % | WEIGHT: 128 LBS | BODY MASS INDEX: 25.13 KG/M2 | RESPIRATION RATE: 18 BRPM | TEMPERATURE: 97.5 F | HEIGHT: 60 IN

## 2023-05-19 DIAGNOSIS — I10 PRIMARY HYPERTENSION: ICD-10-CM

## 2023-05-19 DIAGNOSIS — R92.2 DENSE BREAST TISSUE ON MAMMOGRAM: ICD-10-CM

## 2023-05-19 DIAGNOSIS — E87.6 HYPOKALEMIA: ICD-10-CM

## 2023-05-19 DIAGNOSIS — E78.00 PURE HYPERCHOLESTEROLEMIA: ICD-10-CM

## 2023-05-19 DIAGNOSIS — D50.8 IRON DEFICIENCY ANEMIA SECONDARY TO INADEQUATE DIETARY IRON INTAKE: ICD-10-CM

## 2023-05-19 DIAGNOSIS — Z00.00 ANNUAL PHYSICAL EXAM: Primary | ICD-10-CM

## 2023-05-19 RX ORDER — ADHESIVE BANDAGE 3/4"
BANDAGE TOPICAL DAILY
Qty: 1 EACH | Refills: 0 | Status: SHIPPED | OUTPATIENT
Start: 2023-05-19

## 2023-05-19 NOTE — ASSESSMENT & PLAN NOTE
BP Readings from Last 3 Encounters:   05/19/23 128/82   05/05/23 (!) 190/118   04/14/23 (!) 187/115     Controlled today, off of medications  Will continue off of medications, but recommended ongoing monitoring  Discussed that she may benefit from being on a medication

## 2023-06-17 ENCOUNTER — APPOINTMENT (EMERGENCY)
Dept: CT IMAGING | Facility: HOSPITAL | Age: 48
DRG: 069 | End: 2023-06-17
Payer: COMMERCIAL

## 2023-06-17 ENCOUNTER — HOSPITAL ENCOUNTER (INPATIENT)
Facility: HOSPITAL | Age: 48
LOS: 1 days | Discharge: HOME | DRG: 069 | End: 2023-06-20
Attending: EMERGENCY MEDICINE | Admitting: INTERNAL MEDICINE
Payer: COMMERCIAL

## 2023-06-17 DIAGNOSIS — R29.90 STROKE-LIKE SYMPTOMS: ICD-10-CM

## 2023-06-17 DIAGNOSIS — R29.898 LEFT ARM WEAKNESS: Primary | ICD-10-CM

## 2023-06-17 DIAGNOSIS — I10 PRIMARY HYPERTENSION: ICD-10-CM

## 2023-06-17 PROBLEM — E61.1 IRON DEFICIENCY: Chronic | Status: ACTIVE | Noted: 2023-06-17

## 2023-06-17 PROBLEM — N92.6 IRREGULAR MENSES: Status: ACTIVE | Noted: 2023-06-17

## 2023-06-17 LAB
2HR DELTA HS TROPONIN: 1 NG/L
2HR DELTA HS TROPONIN: 1 NG/L
4HR DELTA HS TROPONIN: 1 NG/L
4HR DELTA HS TROPONIN: 1 NG/L
ANION GAP SERPL CALCULATED.3IONS-SCNC: 5 MMOL/L (ref 4–13)
ANION GAP SERPL CALCULATED.3IONS-SCNC: 5 MMOL/L (ref 4–13)
APTT PPP: 30 SECONDS (ref 23–37)
APTT PPP: 30 SECONDS (ref 23–37)
BUN SERPL-MCNC: 13 MG/DL (ref 5–25)
BUN SERPL-MCNC: 13 MG/DL (ref 5–25)
CALCIUM SERPL-MCNC: 8.8 MG/DL (ref 8.4–10.2)
CALCIUM SERPL-MCNC: 8.8 MG/DL (ref 8.4–10.2)
CARDIAC TROPONIN I PNL SERPL HS: 4 NG/L
CARDIAC TROPONIN I PNL SERPL HS: 4 NG/L
CARDIAC TROPONIN I PNL SERPL HS: 5 NG/L
CHLORIDE SERPL-SCNC: 104 MMOL/L (ref 96–108)
CHLORIDE SERPL-SCNC: 104 MMOL/L (ref 96–108)
CO2 SERPL-SCNC: 28 MMOL/L (ref 21–32)
CO2 SERPL-SCNC: 28 MMOL/L (ref 21–32)
CREAT SERPL-MCNC: 0.91 MG/DL (ref 0.6–1.3)
CREAT SERPL-MCNC: 0.91 MG/DL (ref 0.6–1.3)
ERYTHROCYTE [DISTWIDTH] IN BLOOD BY AUTOMATED COUNT: 18.3 % (ref 11.6–15.1)
ERYTHROCYTE [DISTWIDTH] IN BLOOD BY AUTOMATED COUNT: 18.3 % (ref 11.6–15.1)
EXT PREGNANCY TEST URINE: NEGATIVE
EXT PREGNANCY TEST URINE: NEGATIVE
EXT. CONTROL: NORMAL
EXT. CONTROL: NORMAL
GFR SERPL CREATININE-BSD FRML MDRD: 75 ML/MIN/1.73SQ M
GFR SERPL CREATININE-BSD FRML MDRD: 75 ML/MIN/1.73SQ M
GLUCOSE SERPL-MCNC: 107 MG/DL (ref 65–140)
GLUCOSE SERPL-MCNC: 107 MG/DL (ref 65–140)
GLUCOSE SERPL-MCNC: 99 MG/DL (ref 65–140)
GLUCOSE SERPL-MCNC: 99 MG/DL (ref 65–140)
HCT VFR BLD AUTO: 40.8 % (ref 34.8–46.1)
HCT VFR BLD AUTO: 40.8 % (ref 34.8–46.1)
HGB BLD-MCNC: 12.8 G/DL (ref 11.5–15.4)
HGB BLD-MCNC: 12.8 G/DL (ref 11.5–15.4)
INR PPP: 1.06 (ref 0.84–1.19)
INR PPP: 1.06 (ref 0.84–1.19)
MCH RBC QN AUTO: 24.7 PG (ref 26.8–34.3)
MCH RBC QN AUTO: 24.7 PG (ref 26.8–34.3)
MCHC RBC AUTO-ENTMCNC: 31.4 G/DL (ref 31.4–37.4)
MCHC RBC AUTO-ENTMCNC: 31.4 G/DL (ref 31.4–37.4)
MCV RBC AUTO: 79 FL (ref 82–98)
MCV RBC AUTO: 79 FL (ref 82–98)
PLATELET # BLD AUTO: 176 THOUSANDS/UL (ref 149–390)
PLATELET # BLD AUTO: 176 THOUSANDS/UL (ref 149–390)
PLATELET # BLD AUTO: 192 THOUSANDS/UL (ref 149–390)
PLATELET # BLD AUTO: 192 THOUSANDS/UL (ref 149–390)
PMV BLD AUTO: 10 FL (ref 8.9–12.7)
PMV BLD AUTO: 10 FL (ref 8.9–12.7)
PMV BLD AUTO: 10.8 FL (ref 8.9–12.7)
PMV BLD AUTO: 10.8 FL (ref 8.9–12.7)
POTASSIUM SERPL-SCNC: 3.6 MMOL/L (ref 3.5–5.3)
POTASSIUM SERPL-SCNC: 3.6 MMOL/L (ref 3.5–5.3)
PROTHROMBIN TIME: 14 SECONDS (ref 11.6–14.5)
PROTHROMBIN TIME: 14 SECONDS (ref 11.6–14.5)
RBC # BLD AUTO: 5.18 MILLION/UL (ref 3.81–5.12)
RBC # BLD AUTO: 5.18 MILLION/UL (ref 3.81–5.12)
SODIUM SERPL-SCNC: 137 MMOL/L (ref 135–147)
SODIUM SERPL-SCNC: 137 MMOL/L (ref 135–147)
WBC # BLD AUTO: 5.46 THOUSAND/UL (ref 4.31–10.16)
WBC # BLD AUTO: 5.46 THOUSAND/UL (ref 4.31–10.16)

## 2023-06-17 PROCEDURE — 85027 COMPLETE CBC AUTOMATED: CPT | Performed by: EMERGENCY MEDICINE

## 2023-06-17 PROCEDURE — 85049 AUTOMATED PLATELET COUNT: CPT | Performed by: INTERNAL MEDICINE

## 2023-06-17 PROCEDURE — 99244 OFF/OP CNSLTJ NEW/EST MOD 40: CPT | Performed by: PSYCHIATRY & NEUROLOGY

## 2023-06-17 PROCEDURE — 99285 EMERGENCY DEPT VISIT HI MDM: CPT

## 2023-06-17 PROCEDURE — 81025 URINE PREGNANCY TEST: CPT | Performed by: INTERNAL MEDICINE

## 2023-06-17 PROCEDURE — 85730 THROMBOPLASTIN TIME PARTIAL: CPT | Performed by: EMERGENCY MEDICINE

## 2023-06-17 PROCEDURE — 70496 CT ANGIOGRAPHY HEAD: CPT

## 2023-06-17 PROCEDURE — 82728 ASSAY OF FERRITIN: CPT | Performed by: INTERNAL MEDICINE

## 2023-06-17 PROCEDURE — 84484 ASSAY OF TROPONIN QUANT: CPT | Performed by: EMERGENCY MEDICINE

## 2023-06-17 PROCEDURE — 83550 IRON BINDING TEST: CPT | Performed by: INTERNAL MEDICINE

## 2023-06-17 PROCEDURE — 96374 THER/PROPH/DIAG INJ IV PUSH: CPT

## 2023-06-17 PROCEDURE — 82948 REAGENT STRIP/BLOOD GLUCOSE: CPT

## 2023-06-17 PROCEDURE — 70498 CT ANGIOGRAPHY NECK: CPT

## 2023-06-17 PROCEDURE — 93005 ELECTROCARDIOGRAM TRACING: CPT

## 2023-06-17 PROCEDURE — 99223 1ST HOSP IP/OBS HIGH 75: CPT | Performed by: INTERNAL MEDICINE

## 2023-06-17 PROCEDURE — 36415 COLL VENOUS BLD VENIPUNCTURE: CPT | Performed by: EMERGENCY MEDICINE

## 2023-06-17 PROCEDURE — 99284 EMERGENCY DEPT VISIT MOD MDM: CPT | Performed by: PSYCHIATRY & NEUROLOGY

## 2023-06-17 PROCEDURE — 85610 PROTHROMBIN TIME: CPT | Performed by: EMERGENCY MEDICINE

## 2023-06-17 PROCEDURE — 83540 ASSAY OF IRON: CPT | Performed by: INTERNAL MEDICINE

## 2023-06-17 PROCEDURE — 80048 BASIC METABOLIC PNL TOTAL CA: CPT | Performed by: EMERGENCY MEDICINE

## 2023-06-17 RX ORDER — LABETALOL HYDROCHLORIDE 5 MG/ML
10 INJECTION, SOLUTION INTRAVENOUS ONCE
Status: COMPLETED | OUTPATIENT
Start: 2023-06-17 | End: 2023-06-17

## 2023-06-17 RX ORDER — ASPIRIN 81 MG/1
81 TABLET, CHEWABLE ORAL DAILY
Status: DISCONTINUED | OUTPATIENT
Start: 2023-06-18 | End: 2023-06-20 | Stop reason: HOSPADM

## 2023-06-17 RX ORDER — SENNOSIDES 8.6 MG
1 TABLET ORAL
Status: DISCONTINUED | OUTPATIENT
Start: 2023-06-17 | End: 2023-06-20 | Stop reason: HOSPADM

## 2023-06-17 RX ORDER — ATORVASTATIN CALCIUM 40 MG/1
40 TABLET, FILM COATED ORAL EVERY EVENING
Status: DISCONTINUED | OUTPATIENT
Start: 2023-06-17 | End: 2023-06-20 | Stop reason: HOSPADM

## 2023-06-17 RX ORDER — METOPROLOL SUCCINATE 25 MG/1
25 TABLET, EXTENDED RELEASE ORAL DAILY
Status: DISCONTINUED | OUTPATIENT
Start: 2023-06-17 | End: 2023-06-19

## 2023-06-17 RX ORDER — ENOXAPARIN SODIUM 100 MG/ML
40 INJECTION SUBCUTANEOUS DAILY
Status: DISCONTINUED | OUTPATIENT
Start: 2023-06-18 | End: 2023-06-20 | Stop reason: HOSPADM

## 2023-06-17 RX ORDER — ASPIRIN 81 MG/1
324 TABLET, CHEWABLE ORAL ONCE
Status: COMPLETED | OUTPATIENT
Start: 2023-06-17 | End: 2023-06-17

## 2023-06-17 RX ORDER — ONDANSETRON 2 MG/ML
4 INJECTION INTRAMUSCULAR; INTRAVENOUS EVERY 6 HOURS PRN
Status: DISCONTINUED | OUTPATIENT
Start: 2023-06-17 | End: 2023-06-20 | Stop reason: HOSPADM

## 2023-06-17 RX ORDER — ACETAMINOPHEN 325 MG/1
650 TABLET ORAL EVERY 4 HOURS PRN
Status: DISCONTINUED | OUTPATIENT
Start: 2023-06-17 | End: 2023-06-20 | Stop reason: HOSPADM

## 2023-06-17 RX ORDER — HYDRALAZINE HYDROCHLORIDE 20 MG/ML
5 INJECTION INTRAMUSCULAR; INTRAVENOUS EVERY 6 HOURS PRN
Status: DISCONTINUED | OUTPATIENT
Start: 2023-06-17 | End: 2023-06-19

## 2023-06-17 RX ADMIN — IOHEXOL 85 ML: 350 INJECTION, SOLUTION INTRAVENOUS at 11:43

## 2023-06-17 RX ADMIN — METOPROLOL SUCCINATE 25 MG: 25 TABLET, EXTENDED RELEASE ORAL at 17:46

## 2023-06-17 RX ADMIN — ASPIRIN 81 MG 324 MG: 81 TABLET ORAL at 12:53

## 2023-06-17 RX ADMIN — ATORVASTATIN CALCIUM 40 MG: 40 TABLET, FILM COATED ORAL at 21:11

## 2023-06-17 RX ADMIN — LABETALOL HYDROCHLORIDE 10 MG: 5 INJECTION, SOLUTION INTRAVENOUS at 12:46

## 2023-06-17 NOTE — ED PROVIDER NOTES
History  Chief Complaint   Patient presents with   • STROKE Alert     Was at work when had sudden onset dizziness, left sided weakness, and subjective aphasia  Left arm contracted on arrival  LKW today at 1030  Jeannette Disla) Jeannette Disla is a 56 y o  female who identifies as female    They presented to the emergency department on June 17, 2023  Patient presents with:  STROKE Alert  The patient states that she was at work, walking down the hallway at which time she developed acute onset of left arm weakness as well as headache  Patient states that she has never had any symptoms similar to this in the past   Patient states that she does have history of high blood pressure  Patient notes that she called 911 and was evaluated in route noted to have left-sided arm weakness as well this reporting issues talking  Prehospital stroke alert was initiated  Patient was met at [facility]  Allergies include:  Not on File      Immunizations: There is no immunization history on file for this patient  Immunizations Reviewed  None       No past medical history on file  No past surgical history on file  No family history on file  I have reviewed and agree with the history as documented  No existing history information found  No existing history information found  Review of Systems   Neurological: Positive for dizziness, weakness (lue) and headaches  All other systems reviewed and are negative        Physical Exam  ED Triage Vitals   Temperature Pulse Respirations Blood Pressure SpO2   06/17/23 1245 06/17/23 1135 06/17/23 1135 06/17/23 1135 06/17/23 1135   98 3 °F (36 8 °C) 99 18 (!) 214/111 99 %      Temp Source Heart Rate Source Patient Position - Orthostatic VS BP Location FiO2 (%)   06/17/23 1245 06/17/23 1135 06/17/23 1215 06/17/23 1135 --   Oral Monitor Sitting Right leg       Pain Score       06/17/23 1135       8             Orthostatic Vital Signs  Vitals:    06/17/23 1230 06/17/23 1245 06/17/23 1300 06/17/23 1315   BP: (!) 187/111 (!) 206/112 (!) 189/111 (!) 181/103   Pulse: 84 77 78 72   Patient Position - Orthostatic VS: Sitting Sitting Sitting Sitting       Physical Exam  Vitals and nursing note reviewed  Constitutional:       General: She is not in acute distress  Appearance: She is well-developed  HENT:      Head: Normocephalic and atraumatic  Right Ear: External ear normal       Left Ear: External ear normal       Nose: Nose normal       Mouth/Throat:      Mouth: Mucous membranes are moist    Eyes:      Extraocular Movements: Extraocular movements intact  Conjunctiva/sclera: Conjunctivae normal       Pupils: Pupils are equal, round, and reactive to light  Cardiovascular:      Rate and Rhythm: Normal rate and regular rhythm  Heart sounds: No murmur heard  Pulmonary:      Effort: Pulmonary effort is normal  No respiratory distress  Breath sounds: Normal breath sounds  Abdominal:      Palpations: Abdomen is soft  Tenderness: There is no abdominal tenderness  There is no guarding or rebound  Musculoskeletal:         General: No swelling  Cervical back: Neck supple  Skin:     General: Skin is warm and dry  Capillary Refill: Capillary refill takes less than 2 seconds  Neurological:      Mental Status: She is alert and oriented to person, place, and time  Sensory: No sensory deficit  Comments: Left upper extremity with no effort against gravity    Left-sided facial droop appreciated   Psychiatric:         Mood and Affect: Mood normal          ED Medications  Medications   iohexol (OMNIPAQUE) 350 MG/ML injection (SINGLE-DOSE) 85 mL (85 mL Intravenous Given 6/17/23 1143)   labetalol (NORMODYNE) injection 10 mg (10 mg Intravenous Given 6/17/23 1246)   aspirin chewable tablet 324 mg (324 mg Oral Given 6/17/23 1253)       Diagnostic Studies  Results Reviewed     Procedure Component Value Units Date/Time    HS Troponin I 2hr [[id number]] Collected: 06/17/23 1414    Lab Status: No result Specimen: Blood from Arm, Right     HS Troponin I 4hr [[id number]]     Lab Status: No result Specimen: Blood     HS Troponin 0hr (reflex protocol) [[id number]]  (Normal) Collected: 06/17/23 1205    Lab Status: Final result Specimen: Blood from Arm, Right Updated: 06/17/23 1257     hs TnI 0hr 4 ng/L     Basic metabolic panel [[id number]] Collected: 06/17/23 1205    Lab Status: Final result Specimen: Blood from Arm, Right Updated: 06/17/23 1249     Sodium 137 mmol/L      Potassium 3 6 mmol/L      Chloride 104 mmol/L      CO2 28 mmol/L      ANION GAP 5 mmol/L      BUN 13 mg/dL      Creatinine 0 91 mg/dL      Glucose 99 mg/dL      Calcium 8 8 mg/dL      eGFR 75 ml/min/1 73sq m     Narrative:      [place] guidelines for Chronic Kidney Disease (CKD):   •  Stage 1 with normal or high GFR (GFR > 90 mL/min/1 73 square meters)  •  Stage 2 Mild CKD (GFR = 60-89 mL/min/1 73 square meters)  •  Stage 3A Moderate CKD (GFR = 45-59 mL/min/1 73 square meters)  •  Stage 3B Moderate CKD (GFR = 30-44 mL/min/1 73 square meters)  •  Stage 4 Severe CKD (GFR = 15-29 mL/min/1 73 square meters)  •  Stage 5 End Stage CKD (GFR <15 mL/min/1 73 square meters)  Note: GFR calculation is accurate only with a steady state creatinine    Protime-INR [[id number]]  (Normal) Collected: 06/17/23 1205    Lab Status: Final result Specimen: Blood from Arm, Right Updated: 06/17/23 1246     Protime 14 0 seconds      INR 1 06    APTT [[id number]]  (Normal) Collected: 06/17/23 1205    Lab Status: Final result Specimen: Blood from Arm, Right Updated: 06/17/23 1246     PTT 30 seconds     CBC and Platelet [[id number]]  (Abnormal) Collected: 06/17/23 1205    Lab Status: Final result Specimen: Blood from Arm, Right Updated: 06/17/23 1233     WBC 5 46 Thousand/uL      RBC 5 18 Million/uL      Hemoglobin 12 8 g/dL      Hematocrit 40 8 %      MCV 79 fL      MCH 24 7 pg      MCHC 31 4 g/dL      RDW 18 3 %      Platelets 101 Thousands/uL      MPV 10 8 fL     Fingerstick Glucose (POCT) [[id number]]  (Normal) Collected: 06/17/23 1157    Lab Status: Final result Updated: 06/17/23 1202     POC Glucose 107 mg/dl                  CTA stroke alert (head/neck)   Final Result by Kaden Edwards MD (06/17 1200)      No evidence for high-grade stenosis, focal occlusion or vascular aneurysm of the cervical or intracranial vessels  Findings were directly discussed with Shirley Estrada at 11:50 a m  Workstation performed: [id number]         CT stroke alert brain   Final Result by Kaden Edwards MD (06/17 1158)      No mass effect, acute intracranial hemorrhage or evidence of recent infarction  Findings were directly discussed with Shirley Estrada at 11:50 a m        Workstation performed: [id number]               Procedures  ECG 12 Lead Documentation Only    Date/Time: 6/17/2023 12:29 PM    Performed by: Too Ernst MD  Authorized by: Too Ernst MD    Indications / Diagnosis:  Stroke eval  ECG reviewed by me, the ED Provider: yes    Patient location:  ED  Previous ECG:     Previous ECG:  Unavailable    Comparison to cardiac monitor: Yes    Interpretation:     Interpretation: abnormal    Rate:     ECG rate:  101    ECG rate assessment: tachycardic    Rhythm:     Rhythm: sinus tachycardia    Ectopy:     Ectopy: none    QRS:     QRS axis:  Normal    QRS intervals:  Normal  Conduction:     Conduction: normal    ST segments:     ST segments:  Normal  T waves:     T waves: normal    Other findings:     Other findings: LVH    Comments:      Sinus tachycardia 101 bpm, no PACs, no PVCs, increased QRS deflection in leads V3 through V6 consistent with LVH          ED Course             HEART Risk Score    Flowsheet Row Most Recent Value   Heart Score Risk Calculator    History 0 Filed at: 06/17/2023 1342   ECG 0 Filed at: 06/17/2023 1342   Age 1 Filed at: 06/17/2023 1342   Risk Factors 1 Filed at: 06/17/2023 1342   Troponin 0 Filed at: 06/17/2023 1342   HEART Score 2 Filed at: 06/17/2023 1342           Stroke Assessment     Row Name 06/17/23 1156             NIH Stroke Scale    Interval --      Level of Consciousness (1a ) 0      LOC Questions (1b ) 0      LOC Commands (1c ) 0      Best Gaze (2 ) 0      Visual (3 ) 0      Facial Palsy (4 ) 1      Motor Arm, Left (5a ) 3      Motor Arm, Right (5b ) 0      Motor Leg, Left (6a ) 0      Motor Leg, Right (6b ) 0      Limb Ataxia (7 ) 0      Sensory (8 ) 0      Best Language (9 ) 0      Dysarthria (10 ) 0      Extinction and Inattention (11 ) (Formerly Neglect) 0      Total 4                                    Medical Decision Making  Patient presents with:  STROKE Alert: Was at work when had sudden onset dizziness, left sided weakness, and subjective aphasia  Left arm contracted on arrival  LKW today at 1030  Patient seen and examined noted to have left-sided facial droop as well as no effort against gravity of the left upper extremity upon initial evaluation  Temp:  (98 3 °F (36 8 °C)) 98 3 °F (36 8 °C)  HR:  () 72  Resp:  (14-22) 16  BP: (181-231)/(103-155) 181/103    Differential diagnosis includes but is not limited to CVA, ICH, TIA        Due to patient's history and presentation the following laboratory evidence was collected:  Recent Results (from the past 12 hour(s))  -Fingerstick Glucose (POCT):   Collection Time: 06/17/23 11:57 AM       Result                      Value             Ref Range           POC Glucose                 107               65 - 140 mg/*  -Basic metabolic panel:   Collection Time: 06/17/23 12:05 PM       Result                      Value             Ref Range           Sodium                      137               135 - 147 mm*       Potassium                   3 6               3 5 - 5 3 mm*       Chloride                    104               96 - 108 mmo*       CO2                         28 " 21 - 32 mmol*       ANION GAP                   5                 4 - 13 mmol/L       BUN                         13                5 - 25 mg/dL        Creatinine                  0 91              0 60 - 1 30 *       Glucose                     99                65 - 140 mg/*       Calcium                     8 8               8 4 - 10 2 m*       eGFR                        75                ml/min/1 73s*  -CBC and Platelet:   Collection Time: 06/17/23 12:05 PM       Result                      Value             Ref Range           WBC                         5 46              4 31 - 10 16*       RBC                         5 18 (H)          3 81 - 5 12 *       Hemoglobin                  12 8              11 5 - 15 4 *       Hematocrit                  40 8              34 8 - 46 1 %       MCV                         79 (L)            82 - 98 fL          MCH                         24 7 (L)          26 8 - 34 3 *       MCHC                        31 4              31 4 - 37 4 *       RDW                         18 3 (H)          11 6 - 15 1 %       Platelets                   192               149 - 390 Th*       MPV                         10 8              8 9 - 12 7 fL  -Protime-INR:   Collection Time: 06/17/23 12:05 PM       Result                      Value             Ref Range           Protime                     14 0              11 6 - 14 5 *       INR                         1 06              0 84 - 1 19    -APTT:   Collection Time: 06/17/23 12:05 PM       Result                      Value             Ref Range           PTT                         30                23 - 37 seco*  -HS Troponin 0hr (reflex protocol):   Collection Time: 06/17/23 12:05 PM       Result                      Value             Ref Range           hs TnI 0hr                  4                 \"Refer to [facility]*    Due to patient's history and presentation the following imaging was collected:  CTA stroke alert (head/neck)   Final " Result        No evidence for high-grade stenosis, focal occlusion or vascular aneurysm of the cervical or intracranial vessels  Findings were directly discussed with Steff Dumont at 11:50 a m  Workstation performed: [id number]         CT stroke alert brain   Final Result        No mass effect, acute intracranial hemorrhage or evidence of recent infarction  Findings were directly discussed with Steff Dumont at 11:50 a m  Workstation performed: [id number]              EKG: interpreted by myself as above  Patient was administered:      Medication Administration - last 24 hours from 06/16/2023 1414 to   06/17/2023 1414       Date/Time Order Dose Route Action Action by     06/17/2023 1143 EDT iohexol (OMNIPAQUE) 350 MG/ML injection   (SINGLE-DOSE) 85 mL 85 mL Intravenous Given Claudetta Holes     06/17/2023 1246 EDT labetalol (NORMODYNE) injection 10 mg 10 mg   Intravenous Given Forest Jacques RN     06/17/2023 1253 EDT aspirin chewable tablet 324 mg 324 mg Oral Given   Forest Jacques RN        Discussed with neurology, do not recommend TNK at this time  Patient treated with labetalol as above with improving blood pressure  Given patient's resolvent of symptoms will admit to [facility] [address] with consult to neurology  Discussed patient's case with Dr Jose Ulrich [facility]) regarding admission who accepted the patient for further evaluation and management  Left arm weakness: acute illness or injury  Stroke-like symptoms: acute illness or injury that poses a threat to life or bodily functions  Amount and/or Complexity of Data Reviewed  Labs: ordered  Radiology: ordered  Risk  OTC drugs  Prescription drug management  Decision regarding hospitalization              Disposition  Final diagnoses:   Left arm weakness   Stroke-like symptoms     Time reflects when diagnosis was documented in both MDM as applicable and the Disposition within this note     Time User Action Codes Description Comment    6/17/2023 11:37 AM Olivia Resendezr Add [R29 898] Left arm weakness     6/17/2023  2:07 PM de Genevieve Moya Add [R29 90] Stroke-like symptoms       ED Disposition     ED Disposition   Admit    Condition   Stable    Date/Time   Sat Jun 17, 2023  2:07 PM    Comment   Case was discussed with Dr Car Stacy and the patient's admission status was agreed to be Admission Status: observation status to the service of Dr Car Stacy  Follow-up Information    None         Patient's Medications    No medications on file     No discharge procedures on file  PDMP Review     None           ED Provider  Attending physically available and evaluated Kurt Rahman I managed the patient along with the ED Attending      Electronically Signed by         Stephanie oLrenzo MD  06/17/23 8812

## 2023-06-17 NOTE — ASSESSMENT & PLAN NOTE
· Patient told me, that she did not have any menstruation for about 3 months, but had menstruation recently  · Possibility the patient is at premenopausal stage now  · Pregnancy test ordered, follow-up results (being asked in the MRI order, as well as patient will be on atorvastatin which is contraindicated in pregnancy)  · Outpatient follow-up with primary care physician

## 2023-06-17 NOTE — CONSULTS
"Consultation - Stroke   Kurt Rahman 56 y o  female MRN: [id number]  Unit/Bed#: ED-20 Encounter: [id number]      Assessment/Plan     *Stroke-like symptoms  Assessment & Plan  Kurt Rahman is a 56 y o  female w/ PMH HTN, HA, neck pain w/ LUE sensory sx who p/w acute-onset dizziness (presyncopal description), LUE weakness and left facial weakness concerning for acute ischemic stroke  At time of initial imaging, /111 and NIHSS 3 for arm weakness and facial droop but on re-examination 30 minutes after, all sx had resolved  Differential includes hypertensive urgency and cervical radiculopathy w/ atypical presentation and cannot r/o stroke w/o further workup  • Presenting sx: acute-onset dizziness (presyncopal description), LUE weakness and left facial weakness; NIHSS: 3; TNK not given 2/2 sx resolved  • Workup:  o CTH/CTA: negative for acute blood, signs of ischemia, LVO or critical stenosis   o Labs: Hgb A1c pending,  in March, updated labs pending  o MRI: pending  o Echo: pending    No results found for: \"HGBA1C\", \"LDLCALC\"    Plan:  • Continue stroke pathway for now  • AP/AC and high intensity statin  o Aspirin  o Atorvastatin 40 mg  • Permissive HTN x 24 hrs (goal BP < 180/110), labetalol prn  • F/u labs: A1c, FLP, CMP, CBCD  • F/u MRI - obtain MRI brain and CS given radicular symptoms  • F/u TTE  • Continue monitoring on telemetry, frequent neuro checks, stat CTH for acute change  • + BLE VAS and carotid duplex, + thrombophilia workup pending initial findings  • Replete lytes, goal euglycemia (gluc < 180) and normothermia  • PT/OT/PMR/ST w/ swallow eval if sx recur but if patient remains at baseline likely does not need additional therapies  • Stroke education if stroke found  • Case and plan d/w attending  See attestation additional/updated recommendations      Thrombolytic Decision: Patient not a candidate  Symptoms resolved/clearly non disabling      Recommendations for outpatient neurological follow " up have yet to be determined  History of Present Illness     Reason for Consult / Principal Problem: Stroke alert  Hx and PE limited by: N/A  Patient last known well: 10:30  Stroke alert called: 11:28  Neurology time of arrival: immediate   HPI: Kanchan Oneal is a 56 y o  female w/ PMH HTN, HA, neck pain, LUE sensory symptoms who presents with acute onset of dizziness and left-sided weakness when she was at work  Per discussion with patient she was at her regular baseline when she was at work this morning at about 1030 she had a sudden onset of dizziness described as a presyncopal type sensation (felt like she was going to pass out when she stood up), and she was limping but did not feel clearly weak in one leg versus the other  She did have LUE weakness and on evaluation in the ED she had a left-sided facial droop while she was in the scanner which quickly self resolved within the next 15 minutes  /111 on arrival   NIHSS 3 for LUE weakness and left facial weakness which rapidly resolved to an NIHSS of 0  CTH/CTA negative for acute blood, signs of ischemia, LVO or critical stenosis  Did review the option of TNK while she was still having symptoms but this was not indicated once patient was back to baseline (within 45 minutes of arrival at the hospital)  Patient does have a history of neck pain and has had some sensory symptoms down her left arm for which she has been seen in the ED previously  She is not on any medications for her hypertension because she was unable to tolerate the nifedipine  She is not on any anticoagulants  She is not on any other significant medications but has not had an A1c checked recently    Otherwise she is feeling well and back to baseline, but she is amenable to further work-up in the hospital       Inpatient consult to Neurology  Consult performed by: Olvin Torres MD  Consult ordered by: Ruddy Fowlre MD          Review of Systems   Constitutional: Negative for chills, fatigue and fever  HENT: Negative for hearing loss and voice change  Eyes: Negative for photophobia and visual disturbance  Respiratory: Negative for shortness of breath  Cardiovascular: Negative for chest pain and palpitations  Gastrointestinal: Negative for diarrhea, nausea and vomiting  Endocrine: Negative for polyuria  Genitourinary: Negative for dysuria  Musculoskeletal: Positive for neck pain  Neurological: Positive for dizziness, weakness, light-headedness and headaches  Negative for tremors and numbness  Historical Information   No past medical history on file  No past surgical history on file  Social History   Social History     Substance and Sexual Activity   Alcohol Use Not on file     Social History     Substance and Sexual Activity   Drug Use Not on file     No existing history information found  No existing history information found  Social History     Tobacco Use   Smoking Status Not on file   Smokeless Tobacco Not on file     Family History: No family history on file  Review of previous medical records underway    Meds/Allergies   all current active meds have been reviewed, current meds:   Current Facility-Administered Medications   Medication Dose Route Frequency   • aspirin chewable tablet 324 mg  324 mg Oral Once   • labetalol (NORMODYNE) injection 10 mg  10 mg Intravenous Once   , and PTA meds:   None     Not on File    Objective   Vitals:Blood pressure (!) 231/115, pulse 96, temperature 98 3 °F (36 8 °C), temperature source Oral, resp  rate 22, weight 62 kg (136 lb 11 oz), SpO2 96 %  ,There is no height or weight on file to calculate BMI  No intake or output data in the 24 hours ending 06/17/23 1248    Invasive Devices: Invasive Devices     Peripheral Intravenous Line  Duration           Peripheral IV 06/17/23 Right Antecubital <1 day              Physical Exam Vitals reviewed  Constitutional:    Not in acute distress  Normal appearance   Not ill-appearing, toxic-appearing or diaphoretic  HENT:   Normocephalic and atraumatic  External ear normal b/l  Nose normal  No congestion or rhinorrhea  Mucous membranes are moist   Oropharynx is clear  No oropharyngeal exudate or posterior oropharyngeal erythema  Eyes:    No scleral icterus  No discharge b/l  Conjunctivae normal    Pulmonary:  Pulmonary effort is normal  No respiratory distress  GI: abdomen not distended  Musculoskeletal: no gross deformities  Skin:   Skin is not pale  Psychiatric:       Mood normal  Affect congruent    Neurologic Exam:   Mental Status Alert and oriented to person, place, and time  Attention is normal  Speech is fluent w/o dysarthria   Cranial Nerves Visual fields full to confrontation  PERRL, EOMI, no CN III palsy, no CN VI palsy  no nystagmus  Facial sensation, but expression transiently weak on left side  Palate symmetric  Trapezius strength symmetric  Tongue w/o atrophy or fasciculations, no dysarthria  Motor Exam Muscle bulk and tone grossly normal (transient spasticity in LUE during stroke alert); Pronator drift absent b/l on resolution  Strength intact and symmetric BUE/BLE (LUE transiently unable to comply with commands during strength exam - though patient had limited volitional strength on exam, was resisting flexion and extension maneuvers)  Sensory Exam Light touch, pinprick and Temperature intact and symmetric BUE/BLE  Gait, Coordination, and Reflexes FTN normal b/l; no significant tremor observed  Reflexes: Brachioradialis: 2+ b/l    Biceps: 2+ b/l , Patellar: 2+ b/l,  Plantar response downgoing b/l  No clonus or Pickett's  NIHSS:  1a Level of Consciousness: 0 = Alert   1b  LOC Questions: 0 = Answers both correctly   1c  LOC Commands: 0 = Obeys both correctly   2  Best Gaze: 0 = Normal   3  Visual: 0 = No visual field loss   4  Facial Palsy: 1=Minor paralysis (flattened nasolabial fold, asymmetric on smiling)   5a   Motor Right Arm: 0=No drift, limb holds 90 (or 45) "degrees for full 10 seconds   5b  Motor Left Arm: 2=Some effort against gravity, limb cannot get to or maintain (if cured) 90 (or 45) degrees, drifts down to bed, but has some effort against gravity   6a  Motor Right Le=No drift, limb holds 90 (or 45) degrees for full 10 seconds   6b  Motor Left Le=No drift, limb holds 90 (or 45) degrees for full 10 seconds   7  Limb Ataxia:  0=Absent   8  Sensory: 0=Normal; no sensory loss   9  Best Language:  0=No aphasia, normal   10  Dysarthria: 0=Normal articulation   11  Extinction and Inattention (formerly Neglect): 0=No abnormality   Total Score: 3     Time NIHSS was completed: 11:38    Modified [place] Score:  0 (No baseline symptoms/disability)    Lab Results: I have personally reviewed pertinent reports  , CBC:   Results from last 7 days   Lab Units 23  1205   WBC Thousand/uL 5 46   RBC Million/uL 5 18*   HEMOGLOBIN g/dL 12 8   HEMATOCRIT % 40 8   MCV fL 79*   PLATELETS Thousands/uL 192   , BMP/CMP:       Invalid input(s): \"ALBUMIN\", Vitamin B12:   , HgBA1C:   , TSH:   , Coagulation:   Results from last 7 days   Lab Units 23  1205   INR  1 06   , Lipid Profile:   , Ammonia:   , Urinalysis:       Invalid input(s): \"URIBILINOGEN\", Drug Screen:   , Medication Drug Levels:       Invalid input(s): \"CARBAMAZEPINE\", \"LACOSAMIDE\", \"OXCARBAZEPINE\"  Imaging Studies: I have personally reviewed pertinent reports   , I have personally reviewed pertinent films in PACS, and I have personally reviewed pertinent films in PACS with a Radiologist   CTA stroke alert (head/neck)   Final Result by Hiro Guzman MD ( 1200)      No evidence for high-grade stenosis, focal occlusion or vascular aneurysm of the cervical or intracranial vessels  Findings were directly discussed with Jaquelin Elmore at 11:50 a m                             Workstation performed: [id number]         CT stroke alert brain   Final Result by Hiro Guzman MD ( [id number])      No mass effect, " acute intracranial hemorrhage or evidence of recent infarction  Findings were directly discussed with Annelise Durbin at 11:50 a m  Workstation performed: [id number]           EKG, Pathology, and Other Studies: I have personally reviewed pertinent reports  VTE Prophylaxis: Sequential compression device Blaire Fabian)     Code Status: No Order  Advance Directive and Living Will:      Power of :    POLST:      Counseling / Coordination of Care  Total time spent today 47 minutes  Greater than 50% of total time was spent with the patient and / or family counseling and / or coordination of care   A description of the counseling / coordination of care: stroke alert      Gulshan Armenat MD  [id number] [facility] Neurology Residency, PGY-3

## 2023-06-17 NOTE — ASSESSMENT & PLAN NOTE
· As per neurologist, may now lower down patient's blood pressures  · Patient's blood pressure should be lowered down gradually and not abruptly, due to possibility of TIA/CVA, and at the same time, there is possibility that patient has chronic severe hypertension for a long time, as patient's not on blood pressure medications anymore, due to perceived side effects  · According to the patient, she has history of hypertension and was previously on blood pressure medication, however had some adverse reactions/intolerances  I first discussed with her about starting her on amlodipine, however, patient told me that she had heat sensitivity adverse reaction with that medication  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 taking amlodipine, she would feel a very hot sensation, more than usual, inside her chest every time she would be near a stove or heat, thus, that medication was discontinued  I then discussed with her lisinopril, however she told me that this medication made her bleed more with her menstruation and thus that medication was also discontinued  I then discussed with her losartan, and she told me that this medication gave her palpitations and thus this medication was also discontinued  I explained to her that her adverse effects to the above medications were not typical and possibility not really due to those medications  However, patient was hesitant to start any of those medications  Thus I discussed with her metoprolol and she was okay with this medication, as this medication could also address her occasional palpitations  I initially discussed with her metoprolol tartrate, however, patient prefers on a once a day dosing of medications for better compliance  Thus, we have agreed with metoprolol succinate and I will start at its lowest dose of 25 mg once a day  · IV hydralazine on as-needed basis  · Monitor blood pressures closely  · Adjust treatment accordingly

## 2023-06-17 NOTE — ASSESSMENT & PLAN NOTE
· Patient told me that approximately 3 months ago, she was diagnosed with iron deficiency by her primary care physician at [facility] family medicine practice  She was prescribed with an iron pill  She learned that her hemoglobin today is normal and requested a recheck of her iron panel as she is concerned that she may end up with too much iron in the body  · Hemoglobin is normal   · Recheck iron panel    · Hold off iron medication (ferrous sulfate) for now

## 2023-06-17 NOTE — H&P
[facility]  H&P  Name: Daly Suarez 56 y o  female I MRN: [id number]  Unit/Bed#: S -01 I Date of Admission: 6/17/2023   Date of Service: 6/17/2023 I Hospital Day: 0      Assessment/Plan   * Stroke-like symptoms  Assessment & Plan  · Patient came in with left hand weakness, dizziness and problems speaking, described as slow in in her speech, that happened earlier this morning  All of these symptoms had resolved  · Possible TIA  · Neurologist consulted  Appreciate recommendations  Continue stroke pathway for now  · Aspirin and atorvastatin  From my discussion with the patient, she does not have any intentions of getting pregnant and thus no contraindication for the statin medication at this point  Follow-up pregnancy test results  · As per neurologist, okay with lowering the blood pressure at this point  Thus with possibility of CVA/TIA, I will lower down patient's blood pressure gradually and not abruptly  This was discussed with the patient, okay to start with Toprol-XL 25 mg once a day for now  Patient will also be on IV blood pressure medication as needed basis  Please see further discussions and plans under hypertension  · Neurochecks  · MRI of the brain  (Follow-up pregnancy test result, as MRI order asked if patient is pregnant or not)  · Echocardiogram (aside from TIA/stroke work-up, patient also had chest pain 2 days ago and I heard a questionable faint heart murmur)  · A1c, fasting lipid profile  · PT, OT, ST evaluations and management  Hypertension  Assessment & Plan  · As per neurologist, may now lower down patient's blood pressures  · Patient's blood pressure should be lowered down gradually and not abruptly, due to possibility of TIA/CVA, and at the same time, there is possibility that patient has chronic severe hypertension for a long time, as patient's not on blood pressure medications anymore, due to perceived side effects    · According to the patient, she has history of hypertension and was previously on blood pressure medication, however had some adverse reactions/intolerances  I first discussed with her about starting her on amlodipine, however, patient told me that she had heat sensitivity adverse reaction with that medication  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 taking amlodipine, she would feel a very hot sensation, more than usual, inside her chest every time she would be near a stove or heat, thus, that medication was discontinued  I then discussed with her lisinopril, however she told me that this medication made her bleed more with her menstruation and thus that medication was also discontinued  I then discussed with her losartan, and she told me that this medication gave her palpitations and thus this medication was also discontinued  I explained to her that her adverse effects to the above medications were not typical and possibility not really due to those medications  However, patient was hesitant to start any of those medications  Thus I discussed with her metoprolol and she was okay with this medication, as this medication could also address her occasional palpitations  I initially discussed with her metoprolol tartrate, however, patient prefers on a once a day dosing of medications for better compliance  Thus, we have agreed with metoprolol succinate and I will start at its lowest dose of 25 mg once a day  · IV hydralazine on as-needed basis  · Monitor blood pressures closely  · Adjust treatment accordingly  History of iron deficiency  Assessment & Plan  · Patient told me that approximately 3 months ago, she was diagnosed with iron deficiency by her primary care physician at [facility]  She was prescribed with an iron pill    She learned that her hemoglobin today is normal and requested a recheck of her iron panel as she is concerned that she may end up with too much iron in the body  · Hemoglobin is normal   · Recheck iron panel  · Hold off iron medication (ferrous sulfate) for now    Irregular menses  Assessment & Plan  · Patient told me, that she did not have any menstruation for about 3 months, but had menstruation recently  · Possibility the patient is at premenopausal stage now  · Pregnancy test ordered, follow-up results (being asked in the MRI order, as well as patient will be on atorvastatin which is contraindicated in pregnancy)  · Outpatient follow-up with primary care physician  VTE Pharmacologic Prophylaxis: VTE Score: 6 High Risk (Score >/= 5) - Pharmacological DVT Prophylaxis Ordered: enoxaparin (Lovenox)  Sequential Compression Devices Ordered  Code Status: Level 1 - Full Code   Discussion with family: Patient declined call to   Anticipated Length of Stay: Patient will be admitted on an observation basis with an anticipated length of stay of less than 2 midnights secondary to Above findings and plans  Total Time Spent on Date of Encounter in care of patient: 75 minutes This time was spent on one or more of the following: performing physical exam; counseling and coordination of care; obtaining or reviewing history; documenting in the medical record; reviewing/ordering tests, medications or procedures; communicating with other healthcare professionals and discussing with patient's family/caregivers  Chief Complaint: Stroke-like symptoms with left hand weakness and problems speaking  History of Present Illness:  Shabnam Baer is a 56 y o  female with a PMH significant for hypertension not on any medications and iron deficiency who presents with stroke-like symptoms  According to the patient, she was at work earlier this morning, when all of a sudden, she became dizzy and had left hand weakness and problems speaking  She told me, that her  strength had decreased, but did not admit to arm or forearm weakness    She also told me that she had difficulty speaking, which she described to be slowness in her speech at the time  Patient also had some headache at the time  Thus she called 911 and was evaluated and root to have left-sided arm weakness as well as problems speaking  ER physician on his examination appreciated left upper extremity with no effort against gravity and a left facial droop was appreciated  When asked, patient was not aware that she had left facial droop earlier today  After some time in the emergency room, all of patient's symptoms had resolved  At the time I saw the patient, she was completely asymptomatic  Patient denied any more left hand weakness, problems speaking or any dizziness or headaches  Patient told me that 2 days ago, she had an episode of chest pains, but this has spontaneously resolved  No chest pains at this point  Patient also complains of palpitations when I was with her today, however, when I checked patient's telemetry patient had normal sinus rhythm  Thus I reassured the patient about her normal heart rate and rhythm and patient felt better  Patient admitted to me that she had history of hypertension before but had adverse effects/intolerances to some blood pressure medications and thus she is not taking blood pressure medication anymore  She told me that amlodipine caused her to have heat sensitivity, and that is important for her as she works in [employer], for which patient felt severe heat in her chest every time she got near the stove  She told me that lisinopril causes her to bleed more in her menses than her usual and that losartan gave her palpitations  Thus she does not want to be on these medications again  Patient denies any numbness or any problems swallowing or any loss of consciousness or any shortness of breath        Review of Systems:  Review of Systems     10 point review systems done and they were negative except for the ones I mentioned in my history of present illness and patient told me that for 3 months, she did not have any menstruation until recently  No other bleeding  Patient denied any fever, chills or any cough or colds  Patient denied any nausea or vomiting or any abdominal pains  Patient denied any problems urinating or moving his bowels  For the other symptoms, please see notes above  Past Medical and Surgical History:   Past Medical History:   Diagnosis Date   • Hypertension    • Iron deficiency        History reviewed  No pertinent surgical history  Meds/Allergies:  Prior to Admission medications    Not on File     I have reviewed home medications using recent Epic encounter  Allergies: No Known Allergies    Social History:  Marital Status: Single   Patient Pre-hospital Living Situation: Home  Patient Pre-hospital Level of Mobility: walks  Substance Use History:   Social History     Substance and Sexual Activity   Alcohol Use None     Social History     Tobacco Use   Smoking Status Not on file   Smokeless Tobacco Not on file     Social History     Substance and Sexual Activity   Drug Use Not on file       Family History:  History reviewed  No pertinent family history  Physical Exam:     Vitals:   Blood Pressure: (!) 183/117 (06/17/23 1544)  Pulse: 67 (06/17/23 1500)  Temperature: 98 4 °F (36 9 °C) (06/17/23 1544)  Temp Source: Oral (06/17/23 1500)  Respirations: 18 (06/17/23 1544)  Weight - Scale: 62 kg (136 lb 11 oz) (06/17/23 1210)  SpO2: 96 % (06/17/23 1500)    Physical Exam  Vitals and nursing note reviewed  Constitutional:       General: She is not in acute distress  Appearance: She is not ill-appearing, toxic-appearing or diaphoretic  HENT:      Head: Normocephalic and atraumatic  Mouth/Throat:      Mouth: Mucous membranes are moist       Pharynx: Oropharynx is clear  No oropharyngeal exudate or posterior oropharyngeal erythema  Eyes:      General: No scleral icterus  Right eye: No discharge           Left eye: No discharge  Extraocular Movements: Extraocular movements intact  Conjunctiva/sclera: Conjunctivae normal       Pupils: Pupils are equal, round, and reactive to light  Cardiovascular:      Rate and Rhythm: Normal rate and regular rhythm  Heart sounds: No friction rub  No gallop  Comments: Questionable faint heart murmur appreciated  Pulmonary:      Effort: Pulmonary effort is normal  No respiratory distress  Breath sounds: Normal breath sounds  Abdominal:      General: Bowel sounds are normal  There is no distension  Palpations: Abdomen is soft  Tenderness: There is no abdominal tenderness  There is no guarding  Musculoskeletal:      Right lower leg: No edema  Left lower leg: No edema  Skin:     General: Skin is warm  Coloration: Skin is not pale  Findings: No erythema or rash  Neurological:      General: No focal deficit present  Mental Status: She is alert and oriented to person, place, and time  Cranial Nerves: No cranial nerve deficit  Sensory: No sensory deficit  Motor: No weakness  Psychiatric:         Behavior: Behavior normal          Thought Content: Thought content normal       Comments: Anxious                Additional Data:     Lab Results:  Results from last 7 days   Lab Units 06/17/23  1205   WBC Thousand/uL 5 46   HEMOGLOBIN g/dL 12 8   HEMATOCRIT % 40 8   PLATELETS Thousands/uL 192     Results from last 7 days   Lab Units 06/17/23  1205   SODIUM mmol/L 137   POTASSIUM mmol/L 3 6   CHLORIDE mmol/L 104   CO2 mmol/L 28   BUN mg/dL 13   CREATININE mg/dL 0 91   ANION GAP mmol/L 5   CALCIUM mg/dL 8 8   GLUCOSE RANDOM mg/dL 99     Results from last 7 days   Lab Units 06/17/23  1205   INR  1 06     Results from last 7 days   Lab Units 06/17/23  1157   POC GLUCOSE mg/dl 107               Lines/Drains:  Invasive Devices     Peripheral Intravenous Line  Duration           Peripheral IV 06/17/23 Right Antecubital <1 day Imaging: Reviewed radiology reports from this admission including: CT head and CTA of the head and neck  CTA stroke alert (head/neck)   Final Result by Vinicio Vásquez MD (06/17 1200)      No evidence for high-grade stenosis, focal occlusion or vascular aneurysm of the cervical or intracranial vessels  Findings were directly discussed with Bridgette Simon at 11:50 a m  Workstation performed: [id number]         CT stroke alert brain   Final Result by Vinicio Vásquez MD (06/17 1158)      No mass effect, acute intracranial hemorrhage or evidence of recent infarction  Findings were directly discussed with Bridgette Simon at 11:50 a m  Workstation performed: [id number]         MRI Inpatient Order    (Results Pending)       EKG and Other Studies Reviewed on Admission:   · EKG: Sinus Tachycardia  /min, possible left atrial enlargement, LVH, nonspecific T wave abnormality, no previous EKG for comparison       ** Please Note: This note has been constructed using a voice recognition system   **

## 2023-06-17 NOTE — ED ATTENDING ATTESTATION
6/17/2023  Mitchell FRIAS MD, saw and evaluated the patient  I have discussed the patient with the resident/non-physician practitioner and agree with the resident's/non-physician practitioner's findings, Plan of Care, and MDM as documented in the resident's/non-physician practitioner's note, except where noted  All available labs and Radiology studies were reviewed  I was present for key portions of any procedure(s) performed by the resident/non-physician practitioner and I was immediately available to provide assistance  At this point I agree with the current assessment done in the Emergency Department  I have conducted an independent evaluation of this patient a history and physical is as follows:    78-year-old female with history of hypertension presenting for evaluation of sudden onset of dizziness with difficulty using the left arm and subjective word finding difficulty that started abruptly at 10:30 AM this morning while at work  Patient also reports an 8 out of 10 occipital headache  Prehospital stroke alert was activated  I evaluated the patient at bedside immediately prior to CT scan  She is noted to have no effort against gravity with the left arm  No appreciable word finding difficulty and cranial nerves are intact  Dr Karlee Lees with neurology at bedside    Physical Exam  Constitutional:       General: She is not in acute distress  Appearance: She is well-developed  She is not diaphoretic  HENT:      Head: Normocephalic and atraumatic  Right Ear: External ear normal       Left Ear: External ear normal       Nose: Nose normal    Eyes:      Conjunctiva/sclera: Conjunctivae normal    Cardiovascular:      Rate and Rhythm: Normal rate and regular rhythm  Heart sounds: Normal heart sounds  No murmur heard  No friction rub  No gallop  Pulmonary:      Effort: Pulmonary effort is normal  No respiratory distress  Breath sounds: Normal breath sounds   No wheezing or rales    Abdominal:      General: Bowel sounds are normal  There is no distension  Palpations: Abdomen is soft  Tenderness: There is no abdominal tenderness  There is no guarding  Musculoskeletal:         General: No deformity  Normal range of motion  Cervical back: Normal range of motion and neck supple  Skin:     General: Skin is warm and dry  Neurological:      Mental Status: She is alert and oriented to person, place, and time  Motor: No abnormal muscle tone  Comments: Initial exam with inability to move the L arm and droop to the L corner of the mouth  Subsequent exam : Face symmetric, tongue midline, 5/5 strength in the proximal and distal upper and lower extremities bilaterally with intact sensation to light touch throughout  CN II-XII intact  Normal finger-to-nose, rapid alternating movements, and heel-to-shin bilaterally  Normal speech  Psychiatric:         Mood and Affect: Mood normal            ED Course  ED Course as of 06/17/23 1336   Sat Jun 17, 2023   1205 Stroke alert imaging normal   I personally interpreted the patient's EKG which reveals sinus tachycardia 101 bpm, normal axis, prolonged QTc to 497 ms, T wave inversion in lead aVL, no ST segment deviation, consistent with left ventricular hypertrophy  1240 On reexamination, the patient's previously noted deficits have completely resolved  She no longer has any left arm weakness, facial droop, or word finding difficulty  NIH stroke scale is now 0  She remains hypertensive with blood pressure of 231/115 but denies any headache or vision changes  Patient evaluated by neurology, recommend stroke pathway admission with administration of full-strength aspirin  Allow permissive hypertension up to 491 systolic  Will give 10 mg of IV labetalol and reassess blood pressure  Stroke alert imaging shows no acute findings  1333 /103 following 10 mg of IV labetalol  Pt is stable for admission to [facility]  Critical Care Time  CriticalCare Time    Date/Time: 6/17/2023 1:35 PM    Performed by: Choco Shah MD  Authorized by: Choco Shah MD    Critical care provider statement:     Critical care time (minutes):  35    Critical care time was exclusive of:  Separately billable procedures and treating other patients and teaching time    Critical care was necessary to treat or prevent imminent or life-threatening deterioration of the following conditions: Concern for stroke with consideration of thrombolytics      Critical care was time spent personally by me on the following activities:  Obtaining history from patient or surrogate, development of treatment plan with patient or surrogate, discussions with consultants, evaluation of patient's response to treatment, examination of patient, review of old charts, re-evaluation of patient's condition, ordering and review of radiographic studies, ordering and review of laboratory studies and ordering and performing treatments and interventions    I assumed direction of critical care for this patient from another provider in my specialty: no

## 2023-06-17 NOTE — ASSESSMENT & PLAN NOTE
Lauren Dailey is a 56 y o  female w/ PMH HTN, HA, neck pain w/ LUE sensory sx who p/w acute-onset dizziness (presyncopal description), LUE weakness and left facial weakness concerning for acute ischemic stroke  At time of initial imaging, /111 and NIHSS 3 for arm weakness and facial droop but on re-examination 30 minutes after, all sx had resolved  Differential includes hypertensive urgency and cervical radiculopathy w/ atypical presentation and cannot r/o stroke w/o further workup  • Presenting sx: acute-onset dizziness (presyncopal description), LUE weakness and left facial weakness; NIHSS: 3; TNK not given 2/2 sx resolved  • Workup:  o CTH/CTA: negative for acute blood, signs of ischemia, LVO or critical stenosis   o Labs: Hgb A1c pending,  in March, updated labs pending  o MRI: pending formal read but no clear diffusion restriction on my evaluation; f/u MRI CS  o Echo: pending    Lab Results   Component Value Date    HGBA1C 5 8 (H) 06/18/2023    LDLCALC 119 (H) 06/18/2023     Plan:  • Continue stroke evaluation for now  • AP/AC and high intensity statin  o Aspirin - would continue for now despite MRI negative - suspicion for TIA remains, particularly with risk factors  o Atorvastatin 40 mg - particularly given elevated LDL  • Trend towards normotension at this time  • F/u MRI - obtain MRI brain and CS given radicular symptoms, MRI brain on wet read negative for diffusion restriction indicative of acute stroke but await formal read  • F/u TTE  • Continue monitoring on telemetry, frequent neuro checks, stat CTH for acute change  • Replete lytes, goal euglycemia (gluc < 180) and normothermia  • PT/OT/PMR/ST w/ swallow eval if sx recur but if patient remains at baseline likely does not need additional therapies  • Stroke education if stroke found  • Case and plan d/w attending   See attestation additional/updated recommendations

## 2023-06-17 NOTE — ASSESSMENT & PLAN NOTE
· Patient came in with left hand weakness, dizziness and problems speaking, described as slow in in her speech, that happened earlier this morning  All of these symptoms had resolved  · Possible TIA  · Neurologist consulted  Appreciate recommendations  Continue stroke pathway for now  · Aspirin and atorvastatin  From my discussion with the patient, she does not have any intentions of getting pregnant and thus no contraindication for the statin medication at this point  Follow-up pregnancy test results  · As per neurologist, okay with lowering the blood pressure at this point  Thus with possibility of CVA/TIA, I will lower down patient's blood pressure gradually and not abruptly  This was discussed with the patient, okay to start with Toprol-XL 25 mg once a day for now  Patient will also be on IV blood pressure medication as needed basis  Please see further discussions and plans under hypertension  · Neurochecks  · MRI of the brain  (Follow-up pregnancy test result, as MRI order asked if patient is pregnant or not)  · Echocardiogram (aside from TIA/stroke work-up, patient also had chest pain 2 days ago and I heard a questionable faint heart murmur)  · A1c, fasting lipid profile  · PT, OT, ST evaluations and management  Accepted

## 2023-06-17 NOTE — PLAN OF CARE
Problem: Neurological Deficit  Goal: Neurological status is stable or improving  Description: Interventions:  - Monitor and assess patient's level of consciousness, motor function, sensory function, and level of assistance needed for ADLs  - Monitor and report changes from baseline  Collaborate with interdisciplinary team to initiate plan and implement interventions as ordered  - Provide and maintain a safe environment  - Consider seizure precautions  - Consider fall precautions  - Consider aspiration precautions  - Consider bleeding precautions  Outcome: Progressing     Problem: Activity Intolerance/Impaired Mobility  Goal: Mobility/activity is maintained at optimum level for patient  Description: Interventions:  - Assess and monitor patient  barriers to mobility and need for assistive/adaptive devices  - Assess patient's emotional response to limitations  - Collaborate with interdisciplinary team and initiate plans and interventions as ordered  - Encourage independent activity per ability   - Maintain proper body alignment  - Perform active/passive rom as tolerated/ordered  - Plan activities to conserve energy   - Turn patient as appropriate  Outcome: Progressing     Problem: Communication Impairment  Goal: Ability to express needs and understand communication  Description: Assess patient's communication skills and ability to understand information  Patient will demonstrate use of effective communication techniques, alternative methods of communication and understanding even if not able to speak  - Encourage communication and provide alternate methods of communication as needed  - Collaborate with case management/ for discharge needs  - Include patient/family/caregiver in decisions related to communication    Outcome: Progressing     Problem: Nutrition  Goal: Nutrition/Hydration status is improving  Description: Monitor and assess patient's nutrition/hydration status for malnutrition (ex- brittle hair, bruises, dry skin, pale skin and conjunctiva, muscle wasting, smooth red tongue, and disorientation)  Collaborate with interdisciplinary team and initiate plan and interventions as ordered  Monitor patient's weight and dietary intake as ordered or per policy  Utilize nutrition screening tool and intervene per policy  Determine patient's food preferences and provide high-protein, high-caloric foods as appropriate  - Assist patient with eating   - Allow adequate time for meals   - Encourage patient to take dietary supplement as ordered  - Collaborate with clinical nutritionist   - Include patient/family/caregiver in decisions related to nutrition  Outcome: Progressing     Problem: NEUROSENSORY - ADULT  Goal: Achieves stable or improved neurological status  Description: INTERVENTIONS  - Monitor and report changes in neurological status  - Monitor vital signs such as temperature, blood pressure, glucose, and any other labs ordered   - Initiate measures to prevent increased intracranial pressure  - Monitor for seizure activity and implement precautions if appropriate      Outcome: Progressing  Goal: Achieves maximal functionality and self care  Description: INTERVENTIONS  - Monitor swallowing and airway patency with patient fatigue and changes in neurological status  - Encourage and assist patient to increase activity and self care     - Encourage visually impaired, hearing impaired and aphasic patients to use assistive/communication devices  Outcome: Progressing     Problem: CARDIOVASCULAR - ADULT  Goal: Maintains optimal cardiac output and hemodynamic stability  Description: INTERVENTIONS:  - Monitor I/O, vital signs and rhythm  - Monitor for S/S and trends of decreased cardiac output  - Administer and titrate ordered vasoactive medications to optimize hemodynamic stability  - Assess quality of pulses, skin color and temperature  - Assess for signs of decreased coronary artery perfusion  - Instruct patient to report change in severity of symptoms  Outcome: Progressing  Goal: Absence of cardiac dysrhythmias or at baseline rhythm  Description: INTERVENTIONS:  - Continuous cardiac monitoring, vital signs, obtain 12 lead EKG if ordered  - Administer antiarrhythmic and heart rate control medications as ordered  - Monitor electrolytes and administer replacement therapy as ordered  Outcome: Progressing

## 2023-06-18 ENCOUNTER — APPOINTMENT (OUTPATIENT)
Dept: NON INVASIVE DIAGNOSTICS | Facility: HOSPITAL | Age: 48
DRG: 069 | End: 2023-06-18
Payer: COMMERCIAL

## 2023-06-18 ENCOUNTER — APPOINTMENT (OUTPATIENT)
Dept: MRI IMAGING | Facility: HOSPITAL | Age: 48
DRG: 069 | End: 2023-06-18
Payer: COMMERCIAL

## 2023-06-18 LAB
ANION GAP SERPL CALCULATED.3IONS-SCNC: 5 MMOL/L (ref 4–13)
AORTIC ROOT: 3.9 CM
APICAL FOUR CHAMBER EJECTION FRACTION: 65 %
ASCENDING AORTA: 3.4 CM
BUN SERPL-MCNC: 9 MG/DL (ref 5–25)
CALCIUM SERPL-MCNC: 8.9 MG/DL (ref 8.4–10.2)
CHLORIDE SERPL-SCNC: 106 MMOL/L (ref 96–108)
CHOLEST SERPL-MCNC: 190 MG/DL
CO2 SERPL-SCNC: 29 MMOL/L (ref 21–32)
CREAT SERPL-MCNC: 0.7 MG/DL (ref 0.6–1.3)
E WAVE DECELERATION TIME: 287 MS
ERYTHROCYTE [DISTWIDTH] IN BLOOD BY AUTOMATED COUNT: 18.5 % (ref 11.6–15.1)
EST. AVERAGE GLUCOSE BLD GHB EST-MCNC: 120 MG/DL
FERRITIN SERPL-MCNC: 11 NG/ML (ref 11–307)
FRACTIONAL SHORTENING: 32 (ref 28–44)
GFR SERPL CREATININE-BSD FRML MDRD: 104 ML/MIN/1.73SQ M
GLUCOSE SERPL-MCNC: 105 MG/DL (ref 65–140)
HBA1C MFR BLD: 5.8 %
HCT VFR BLD AUTO: 41 % (ref 34.8–46.1)
HDLC SERPL-MCNC: 63 MG/DL
HGB BLD-MCNC: 13.1 G/DL (ref 11.5–15.4)
INTERVENTRICULAR SEPTUM IN DIASTOLE (PARASTERNAL SHORT AXIS VIEW): 1.5 CM
INTERVENTRICULAR SEPTUM: 1.5 CM (ref 0.6–1.1)
IRON SATN MFR SERPL: 15 % (ref 15–50)
IRON SERPL-MCNC: 61 UG/DL (ref 50–170)
LAAS-AP2: 18.3 CM2
LAAS-AP4: 11.1 CM2
LDLC SERPL CALC-MCNC: 119 MG/DL (ref 0–100)
LEFT ATRIUM SIZE: 3 CM
LEFT INTERNAL DIMENSION IN SYSTOLE: 2.3 CM (ref 2.1–4)
LEFT VENTRICULAR INTERNAL DIMENSION IN DIASTOLE: 3.4 CM (ref 3.5–6)
LEFT VENTRICULAR POSTERIOR WALL IN END DIASTOLE: 1.5 CM
LEFT VENTRICULAR STROKE VOLUME: 30 ML
LVSV (TEICH): 30 ML
MCH RBC QN AUTO: 25 PG (ref 26.8–34.3)
MCHC RBC AUTO-ENTMCNC: 32 G/DL (ref 31.4–37.4)
MCV RBC AUTO: 78 FL (ref 82–98)
MV E'TISSUE VEL-SEP: 4 CM/S
MV PEAK A VEL: 0.73 M/S
MV PEAK E VEL: 54 CM/S
MV STENOSIS PRESSURE HALF TIME: 83 MS
MV VALVE AREA P 1/2 METHOD: 2.65
PLATELET # BLD AUTO: 183 THOUSANDS/UL (ref 149–390)
PMV BLD AUTO: 10.8 FL (ref 8.9–12.7)
POTASSIUM SERPL-SCNC: 3.3 MMOL/L (ref 3.5–5.3)
RBC # BLD AUTO: 5.24 MILLION/UL (ref 3.81–5.12)
RIGHT ATRIUM AREA SYSTOLE A4C: 13.8 CM2
RIGHT VENTRICLE ID DIMENSION: 3 CM
SL CV LEFT ATRIUM LENGTH A2C: 5.1 CM
SL CV PED ECHO LEFT VENTRICLE DIASTOLIC VOLUME (MOD BIPLANE) 2D: 47 ML
SL CV PED ECHO LEFT VENTRICLE SYSTOLIC VOLUME (MOD BIPLANE) 2D: 17 ML
SODIUM SERPL-SCNC: 140 MMOL/L (ref 135–147)
TIBC SERPL-MCNC: 411 UG/DL (ref 250–450)
TR MAX PG: 11 MMHG
TR PEAK VELOCITY: 1.7 M/S
TRICUSPID ANNULAR PLANE SYSTOLIC EXCURSION: 1.9 CM
TRICUSPID VALVE PEAK REGURGITATION VELOCITY: 1.65 M/S
TRIGL SERPL-MCNC: 40 MG/DL
WBC # BLD AUTO: 3.79 THOUSAND/UL (ref 4.31–10.16)

## 2023-06-18 PROCEDURE — 99233 SBSQ HOSP IP/OBS HIGH 50: CPT | Performed by: PHYSICIAN ASSISTANT

## 2023-06-18 PROCEDURE — 99214 OFFICE O/P EST MOD 30 MIN: CPT | Performed by: PSYCHIATRY & NEUROLOGY

## 2023-06-18 PROCEDURE — 83036 HEMOGLOBIN GLYCOSYLATED A1C: CPT | Performed by: INTERNAL MEDICINE

## 2023-06-18 PROCEDURE — 80048 BASIC METABOLIC PNL TOTAL CA: CPT | Performed by: INTERNAL MEDICINE

## 2023-06-18 PROCEDURE — 70551 MRI BRAIN STEM W/O DYE: CPT

## 2023-06-18 PROCEDURE — 80061 LIPID PANEL: CPT | Performed by: INTERNAL MEDICINE

## 2023-06-18 PROCEDURE — 85027 COMPLETE CBC AUTOMATED: CPT | Performed by: INTERNAL MEDICINE

## 2023-06-18 PROCEDURE — 93306 TTE W/DOPPLER COMPLETE: CPT

## 2023-06-18 RX ORDER — POTASSIUM CHLORIDE 20 MEQ/1
20 TABLET, EXTENDED RELEASE ORAL ONCE
Status: COMPLETED | OUTPATIENT
Start: 2023-06-18 | End: 2023-06-18

## 2023-06-18 RX ADMIN — ACETAMINOPHEN 650 MG: 325 TABLET ORAL at 23:15

## 2023-06-18 RX ADMIN — METOPROLOL SUCCINATE 25 MG: 25 TABLET, EXTENDED RELEASE ORAL at 08:16

## 2023-06-18 RX ADMIN — POTASSIUM CHLORIDE 20 MEQ: 1500 TABLET, EXTENDED RELEASE ORAL at 08:16

## 2023-06-18 RX ADMIN — HYDRALAZINE HYDROCHLORIDE 5 MG: 20 INJECTION INTRAMUSCULAR; INTRAVENOUS at 21:18

## 2023-06-18 RX ADMIN — ENOXAPARIN SODIUM 40 MG: 40 INJECTION SUBCUTANEOUS at 08:16

## 2023-06-18 RX ADMIN — ACETAMINOPHEN 650 MG: 325 TABLET ORAL at 07:34

## 2023-06-18 RX ADMIN — ASPIRIN 81 MG 81 MG: 81 TABLET ORAL at 08:16

## 2023-06-18 RX ADMIN — ATORVASTATIN CALCIUM 40 MG: 40 TABLET, FILM COATED ORAL at 17:45

## 2023-06-18 NOTE — SPEECH THERAPY NOTE
Speech Language/Pathology  Speech/Language Pathology  Assessment    Patient Name: Grant Schaefer  DKARQ'R Date: 6/18/2023     Problem List  Principal Problem:    Stroke-like symptoms  Active Problems:    Hypertension    History of iron deficiency    Irregular menses    Past Medical History  Past Medical History:   Diagnosis Date   • Hypertension    • Iron deficiency      Past Surgical History  History reviewed  No pertinent surgical history  Grant Schaefer is a 56 y o  female with a PMH significant for hypertension not on any medications and iron deficiency who presents with stroke-like symptoms  According to the patient, she was at work earlier this morning, when all of a sudden, she became dizzy and had left hand weakness and problems speaking  She told me, that her  strength had decreased, but did not admit to arm or forearm weakness  She also told me that she had difficulty speaking, which she described to be slowness in her speech at the time  Patient also had some headache at the time  Thus she called 911 and was evaluated and root to have left-sided arm weakness as well as problems speaking  ER physician on his examination appreciated left upper extremity with no effort against gravity and a left facial droop was appreciated  When asked, patient was not aware that she had left facial droop earlier today  After some time in the emergency room, all of patient's symptoms had resolved  At the time I saw the patient, she was completely asymptomatic  Patient denied any more left hand weakness, problems speaking or any dizziness or headaches  Consult received for speech/swallow eval on stroke pathway  Pt passed nsg swallow screen; tolerating regular diet w/o s/s dysphagia or aspiration  No speech/language deficits reported  NIH score is 0  MRI: 6/18/23 No mass effect, acute intracranial hemorrhage or evidence of recent infarction  No need for formal speech/swallow eval at this time   Reconsult if needed      Amber Chong [facility]  Speech Pathologist  Available via  tiger text

## 2023-06-18 NOTE — PLAN OF CARE
Problem: Neurological Deficit  Goal: Neurological status is stable or improving  Description: Interventions:  - Monitor and assess patient's level of consciousness, motor function, sensory function, and level of assistance needed for ADLs  - Monitor and report changes from baseline  Collaborate with interdisciplinary team to initiate plan and implement interventions as ordered  - Provide and maintain a safe environment  - Consider seizure precautions  - Consider fall precautions  - Consider aspiration precautions  - Consider bleeding precautions  Outcome: Progressing     Problem: Activity Intolerance/Impaired Mobility  Goal: Mobility/activity is maintained at optimum level for patient  Description: Interventions:  - Assess and monitor patient  barriers to mobility and need for assistive/adaptive devices  - Assess patient's emotional response to limitations  - Collaborate with interdisciplinary team and initiate plans and interventions as ordered  - Encourage independent activity per ability   - Maintain proper body alignment  - Perform active/passive rom as tolerated/ordered  - Plan activities to conserve energy   - Turn patient as appropriate  Outcome: Progressing     Problem: Communication Impairment  Goal: Ability to express needs and understand communication  Description: Assess patient's communication skills and ability to understand information  Patient will demonstrate use of effective communication techniques, alternative methods of communication and understanding even if not able to speak  - Encourage communication and provide alternate methods of communication as needed  - Collaborate with case management/ for discharge needs  - Include patient/family/caregiver in decisions related to communication    Outcome: Progressing     Problem: Nutrition  Goal: Nutrition/Hydration status is improving  Description: Monitor and assess patient's nutrition/hydration status for malnutrition (ex- brittle hair, bruises, dry skin, pale skin and conjunctiva, muscle wasting, smooth red tongue, and disorientation)  Collaborate with interdisciplinary team and initiate plan and interventions as ordered  Monitor patient's weight and dietary intake as ordered or per policy  Utilize nutrition screening tool and intervene per policy  Determine patient's food preferences and provide high-protein, high-caloric foods as appropriate  - Assist patient with eating   - Allow adequate time for meals   - Encourage patient to take dietary supplement as ordered  - Collaborate with clinical nutritionist   - Include patient/family/caregiver in decisions related to nutrition  Outcome: Progressing     Problem: NEUROSENSORY - ADULT  Goal: Achieves stable or improved neurological status  Description: INTERVENTIONS  - Monitor and report changes in neurological status  - Monitor vital signs such as temperature, blood pressure, glucose, and any other labs ordered   - Initiate measures to prevent increased intracranial pressure  - Monitor for seizure activity and implement precautions if appropriate      Outcome: Progressing  Goal: Achieves maximal functionality and self care  Description: INTERVENTIONS  - Monitor swallowing and airway patency with patient fatigue and changes in neurological status  - Encourage and assist patient to increase activity and self care     - Encourage visually impaired, hearing impaired and aphasic patients to use assistive/communication devices  Outcome: Progressing     Problem: CARDIOVASCULAR - ADULT  Goal: Maintains optimal cardiac output and hemodynamic stability  Description: INTERVENTIONS  - Monitor I/O, vital signs and rhythm  - Monitor for S/S and trends of decreased cardiac output  - Administer and titrate ordered vasoactive medications to optimize hemodynamic stability  - Assess quality of pulses, skin color and temperature  - Assess for signs of decreased coronary artery perfusion  - Instruct patient to report change in severity of symptoms  Outcome: Progressing  Goal: Absence of cardiac dysrhythmias or at baseline rhythm  Description: INTERVENTIONS:  - Continuous cardiac monitoring, vital signs, obtain 12 lead EKG if ordered  - Administer antiarrhythmic and heart rate control medications as ordered  - Monitor electrolytes and administer replacement therapy as ordered  Outcome: Progressing

## 2023-06-18 NOTE — PROGRESS NOTES
NEUROLOGY PROGRESS NOTE     Name: Ben Cunningham   Age & Sex: 56 y o  female   MRN: [id number]  Unit/Bed#: S -01   Encounter: [id number]    ASSESSMENT & PLAN     * Stroke-like symptoms  Assessment & Plan  Ben Cunningham is a 56 y o  female w/ PMH HTN, HA, neck pain w/ LUE sensory sx who p/w acute-onset dizziness (presyncopal description), LUE weakness and left facial weakness concerning for acute ischemic stroke  At time of initial imaging, /111 and NIHSS 3 for arm weakness and facial droop but on re-examination 30 minutes after, all sx had resolved   Differential includes hypertensive urgency and cervical radiculopathy w/ atypical presentation and cannot r/o stroke w/o further workup  • Presenting sx: acute-onset dizziness (presyncopal description), LUE weakness and left facial weakness; NIHSS: 3; TNK not given 2/2 sx resolved  • Workup:  o CTH/CTA: negative for acute blood, signs of ischemia, LVO or critical stenosis   o Labs: Hgb A1c pending,  in March, updated labs pending  o MRI: pending formal read but no clear diffusion restriction on my evaluation; f/u MRI CS  o Echo: pending    Lab Results   Component Value Date    HGBA1C 5 8 (H) 06/18/2023    LDLCALC 119 (H) 06/18/2023     Plan:  • Continue stroke evaluation for now  • AP/AC and high intensity statin  o Aspirin - would continue for now despite MRI negative - suspicion for TIA remains, particularly with risk factors  o Atorvastatin 40 mg - particularly given elevated LDL  • Trend towards normotension at this time  • F/u MRI - obtain MRI brain and CS given radicular symptoms, MRI brain on wet read negative for diffusion restriction indicative of acute stroke but await formal read  • F/u TTE  • Continue monitoring on telemetry, frequent neuro checks, stat CTH for acute change  • Replete lytes, goal euglycemia (gluc < 180) and normothermia  • PT/OT/PMR/ST w/ swallow eval if sx recur but if patient remains at baseline likely does not need additional therapies  • Stroke education if stroke found  • Case and plan d/w attending  See attestation additional/updated recommendations    Recommendations for outpatient neurological follow up have yet to be determined  Pending for d/c: MRI, echo    SUBJECTIVE     Patient was seen and examined  No critical events overnight  Patient appears well and is back to baseline  Denies N/V/F/C, abdominal pain, dizziness, HA, visual changes, new weakness or sensory changes, bowel or bladder dysfunction     ROS negative unless otherwise noted    OBJECTIVE     Patient ID: Kiki Newton is a 56 y o  female  Vitals:    23 [phone] 23 0806 23 0816 23 0914   BP: (!) 199/121 (!) 189/110  (!) 158/102   BP Location:  Right arm     Pulse:   69    Resp: 16      Temp: 98 5 °F (36 9 °C)      TempSrc:       SpO2:       Weight:            Temperature: Temp (24hrs), Av 2 °F (36 8 °C), Min:97 3 °F (36 3 °C), Max:98 5 °F (36 9 °C)   Temperature: 98 5 °F (36 9 °C)    Physical Exam Vitals reviewed  Constitutional:    Not in acute distress  Normal appearance  Not ill-appearing, toxic-appearing or diaphoretic  HENT:   Normocephalic and atraumatic  External ear normal b/l  Nose normal  No congestion or rhinorrhea  Mucous membranes are moist   Oropharynx is clear  No oropharyngeal exudate or posterior oropharyngeal erythema  Eyes:    No scleral icterus  No discharge b/l  Conjunctivae normal    Pulmonary:  Pulmonary effort is normal  No respiratory distress  GI: abdomen not distended  Musculoskeletal: no gross deformities  Skin:   Skin is not pale  Psychiatric:       Mood normal  Affect congruent    Neurologic Exam   Mental Status Alert and oriented to person, place, and time  Attention is normal  Speech is fluent w/o dysarthria   Cranial Nerves Visual fields full to confrontation  PERRL, brisk reactivity, intact consensual response b/l, EOMI, no CN III palsy, no CN VI palsy  no nystagmus   Facial "sensation symmetric  Facial expression full, symmetric  Hearing roughly symmetric  Palate symmetric  Trapezius strength symmetric  No dysarthria, tongue symmetric w/o atrophy or fasciculations  Motor Exam Muscle bulk and tone grossly normal; Pronator drift absent b/l  Strength intact and symmetric BUE/BLE   Sensory Exam Light touch, vibration and Temperature intact and symmetric BUE/BLE  Gait, Coordination, and Reflexes FTN normal b/l; no significant tremor observed  Reflexes: Brachioradialis: 2+ b/l    Biceps: 2+ b/l    Patellar: 2+ b/l     Plantar response downgoing b/l  No clonus  No Pickett's  Gait: normal casual gait with average stance, stride length, arm swing    LABORATORY DATA     Labs: I have personally reviewed pertinent reports  Results from last 7 days   Lab Units 06/18/23  0444 06/17/23  1918 06/17/23  1205   WBC Thousand/uL 3 79*  --  5 46   HEMOGLOBIN g/dL 13 1  --  12 8   HEMATOCRIT % 41 0  --  40 8   PLATELETS Thousands/uL 183 176 192      Results from last 7 days   Lab Units 06/18/23  0444 06/17/23  1205   POTASSIUM mmol/L 3 3* 3 6   CHLORIDE mmol/L 106 104   CO2 mmol/L 29 28   BUN mg/dL 9 13   CREATININE mg/dL 0 70 0 91   CALCIUM mg/dL 8 9 8 8              Results from last 7 days   Lab Units 06/17/23  1205   INR  1 06   PTT seconds 30     Lab Results   Component Value Date    HGBA1C 5 8 (H) 06/18/2023    LDLCALC 119 (H) 06/18/2023             ABG:No results found for: \"PHART\", \"SHH9ZQH\", \"PO2ART\", \"UJS1OMJ\", \"P5RKPUGG\", \"BEART\", \"SOURCE\"    IMAGING & DIAGNOSTIC TESTING     Radiology Results: I have personally reviewed pertinent reports  and I have personally reviewed pertinent films in PACS  CTA stroke alert (head/neck)   Final Result by Fernando Viramontes MD (06/17 1200)      No evidence for high-grade stenosis, focal occlusion or vascular aneurysm of the cervical or intracranial vessels              Findings were directly discussed with Hubert Carrizales at 11:50 a m                          " Workstation performed: [id number]         CT stroke alert brain   Final Result by Andrea Cole MD (06/17 9809)      No mass effect, acute intracranial hemorrhage or evidence of recent infarction  Findings were directly discussed with Michelle Cherry at 11:50 a m  Workstation performed: [id number]         MRI brain wo contrast    (Results Pending)       Other Diagnostic Testing: I have personally reviewed pertinent reports        ACTIVE MEDICATIONS     Current Facility-Administered Medications   Medication Dose Route Frequency   • acetaminophen (TYLENOL) tablet 650 mg  650 mg Oral Q4H PRN   • aspirin chewable tablet 81 mg  81 mg Oral Daily   • atorvastatin (LIPITOR) tablet 40 mg  40 mg Oral QPM   • enoxaparin (LOVENOX) subcutaneous injection 40 mg  40 mg Subcutaneous Daily   • hydrALAZINE (APRESOLINE) injection 5 mg  5 mg Intravenous Q6H PRN   • metoprolol succinate (TOPROL-XL) 24 hr tablet 25 mg  25 mg Oral Daily   • ondansetron (ZOFRAN) injection 4 mg  4 mg Intravenous Q6H PRN   • senna (SENOKOT) tablet 8 6 mg  1 tablet Oral HS PRN       VTE Pharmacologic Prophylaxis: Enoxaparin (Lovenox)  VTE Mechanical Prophylaxis: sequential compression device    ==========  Juan Carson MD  Resident, Neurology, PGY-3  [address]

## 2023-06-18 NOTE — ASSESSMENT & PLAN NOTE
· Patient told me that approximately 3 months ago, she was diagnosed with iron deficiency by her primary care physician at [facility] practice  She was prescribed with an iron pill  She learned that her hemoglobin today is normal and requested a recheck of her iron panel as she is concerned that she may end up with too much iron in the body  · Hemoglobin is normal   · Recheck iron panel   Iron panel normal  · Hold off iron medication (ferrous sulfate) for now

## 2023-06-18 NOTE — PROGRESS NOTES
[facility]  Progress Note  Name: Carolann Carson  MRN: [id number]  Unit/Bed#: S -01 I Date of Admission: 6/17/2023   Date of Service: 6/18/2023 I Hospital Day: 0    Assessment/Plan   Irregular menses  Assessment & Plan  · Patient told me, that she did not have any menstruation for about 3 months, but had menstruation recently  · Possibility the patient is at premenopausal stage now  · Pregnancy test ordered, follow-up results-negative (being asked in the MRI order, as well as patient will be on atorvastatin which is contraindicated in pregnancy)  · Outpatient follow-up with primary care physician  History of iron deficiency  Assessment & Plan  · Patient told me that approximately 3 months ago, she was diagnosed with iron deficiency by her primary care physician at [facility] family medicine practice  She was prescribed with an iron pill  She learned that her hemoglobin today is normal and requested a recheck of her iron panel as she is concerned that she may end up with too much iron in the body  · Hemoglobin is normal   · Recheck iron panel  Iron panel normal  · Hold off iron medication (ferrous sulfate) for now    Hypertension  Assessment & Plan  · As per neurologist, may now lower down patient's blood pressures  · Patient's blood pressure should be lowered down gradually and not abruptly, due to possibility of TIA/CVA, and at the same time, there is possibility that patient has chronic severe hypertension for a long time, as patient's not on blood pressure medications anymore, due to perceived side effects  · According to the patient, she has history of hypertension and was previously on blood pressure medication, however had some adverse reactions/intolerances  I first discussed with her about starting her on amlodipine, however, patient told me that she had heat sensitivity adverse reaction with that medication    She told me that she works in [employer] and when she started taking amlodipine, she would feel a very hot sensation, more than usual, inside her chest every time she would be near a stove or heat, thus, that medication was discontinued  I then discussed with her lisinopril, however she told me that this medication made her bleed more with her menstruation and thus that medication was also discontinued  I then discussed with her losartan, and she told me that this medication gave her palpitations and thus this medication was also discontinued  I explained to her that her adverse effects to the above medications were not typical and possibility not really due to those medications  However, patient was hesitant to start any of those medications  Thus I discussed with her metoprolol and she was okay with this medication, as this medication could also address her occasional palpitations  I initially discussed with her metoprolol tartrate, however, patient prefers on a once a day dosing of medications for better compliance  Thus, we have agreed with metoprolol succinate and I will start at its lowest dose of 25 mg once a day  · IV hydralazine on as-needed basis  · Monitor blood pressures closely  · Adjust treatment accordingly  · BP today slightly improved 171/105, was as high as 231/115 yesterday  BP being lowered gradually  Metoprolol started yesterday  Has IV hydralazine PRN  Continue to monitor closely    * Stroke-like symptoms  Assessment & Plan  · Patient came in with left hand weakness, dizziness and problems speaking, described as slow in in her speech, that happened earlier this morning  All of these symptoms had resolved  · Possible TIA  · Neurologist consulted  Appreciate recommendations  Continue stroke pathway for now  MRI pending  · Aspirin and atorvastatin  From my discussion with the patient, she does not have any intentions of getting pregnant and thus no contraindication for the statin medication at this point    Follow-up pregnancy test results- negative  · As per neurologist, okay with lowering the blood pressure at this point  Thus with possibility of CVA/TIA, I will lower down patient's blood pressure gradually and not abruptly  This was discussed with the patient, okay to start with Toprol-XL 25 mg once a day for now  Patient will also be on IV blood pressure medication as needed basis  Please see further discussions and plans under hypertension  · Neurochecks  · MRI of the brain  (Follow-up pregnancy test result- negative, as MRI order asked if patient is pregnant or not)  · Echocardiogram (aside from TIA/stroke work-up, patient also had chest pain 2 days ago and I heard a questionable faint heart murmur)  ECHO pending  · A1c, fasting lipid profile  · PT, OT, ST evaluations and management  · Does complain of headache this AM, better with tylenol             VTE Pharmacologic Prophylaxis:   Pharmacologic: Enoxaparin (Lovenox)  Mechanical VTE Prophylaxis in Place: No        Education and Discussions with Family / Patient: patient updated    Time Spent for Care: 30 minutes  More than 50% of total time spent on counseling and coordination of care as described above  Current Length of Stay: 0 day(s)    Current Patient Status: Observation   Certification Statement: The patient will continue to require additional inpatient hospital stay due to MRI, ECHO pending  BP still elevated requiring IV medication    Discharge Plan: home when ready, likely in next 24 hours    Code Status: Level 1 - Full Code      Subjective:   56year old female admitted last evening with stroke like symptoms including dizziness, speech difficulty and left hand weakness  She states all of these symptoms have since resolved  She was noted to have significantly elevated BP's, improving today   She did have a headache today which is better with tylenol    Objective:     Vitals:   Temp (24hrs), Av 2 °F (36 8 °C), Min:97 3 °F (36 3 °C), Max:98 5 °F (36 9 °C)    Temp:  [97 3 °F (36 3 °C)-98 5 °F (36 9 °C)] 98 5 °F (36 9 °C)  HR:  [] 66  Resp:  [14-22] 16  BP: (171-231)/(103-155) 199/121  SpO2:  [96 %-99 %] 96 %  There is no height or weight on file to calculate BMI  Input and Output Summary (last 24 hours): Intake/Output Summary (Last 24 hours) at 6/18/2023 0803  Last data filed at 6/17/2023 1215  Gross per 24 hour   Intake --   Output 1000 ml   Net -1000 ml       Physical Exam:     Physical Exam  Vitals reviewed  Constitutional:       General: She is not in acute distress  Appearance: She is not ill-appearing or diaphoretic  HENT:      Head: Normocephalic and atraumatic  Nose: Nose normal       Mouth/Throat:      Pharynx: Oropharynx is clear  Eyes:      Conjunctiva/sclera: Conjunctivae normal    Cardiovascular:      Rate and Rhythm: Normal rate  Pulmonary:      Effort: Pulmonary effort is normal  No respiratory distress  Abdominal:      General: Bowel sounds are normal  There is no distension  Palpations: Abdomen is soft  Tenderness: There is no abdominal tenderness  Musculoskeletal:         General: No deformity or signs of injury  Skin:     General: Skin is warm and dry  Findings: No bruising or erythema  Neurological:      General: No focal deficit present  Mental Status: She is alert and oriented to person, place, and time     Psychiatric:         Mood and Affect: Mood normal          Behavior: Behavior normal            Additional Data:     Labs:    Results from last 7 days   Lab Units 06/18/23  0444   WBC Thousand/uL 3 79*   HEMOGLOBIN g/dL 13 1   HEMATOCRIT % 41 0   PLATELETS Thousands/uL 183     Results from last 7 days   Lab Units 06/18/23  0444   SODIUM mmol/L 140   POTASSIUM mmol/L 3 3*   CHLORIDE mmol/L 106   CO2 mmol/L 29   BUN mg/dL 9   CREATININE mg/dL 0 70   ANION GAP mmol/L 5   CALCIUM mg/dL 8 9   GLUCOSE RANDOM mg/dL 105     Results from last 7 days   Lab Units 06/17/23  1205   INR  1 06 Results from last 7 days   Lab Units 06/17/23  1157   POC GLUCOSE mg/dl 107                   * I Have Reviewed All Lab Data Listed Above  * Additional Pertinent Lab Tests Reviewed: All [place] Admission Reviewed        Recent Cultures (last 7 days):           Last 24 Hours Medication List:   Current Facility-Administered Medications   Medication Dose Route Frequency Provider Last Rate   • acetaminophen  650 mg Oral Q4H PRN Jimbo Ho MD     • aspirin  81 mg Oral Daily Denise Ramsay MD     • atorvastatin  40 mg Oral QPM Denise Ramsay MD     • enoxaparin  40 mg Subcutaneous Daily Denise Ramsay MD     • hydrALAZINE  5 mg Intravenous Q6H PRN Denise Ramsay MD     • metoprolol succinate  25 mg Oral Daily Denise Ramsay MD     • ondansetron  4 mg Intravenous Q6H PRN Denise Ramsay MD     • potassium chloride  20 mEq Oral Once Ketty, Minturn and CompanyANTOINE     • senna  1 tablet Oral HS PRN Guy Dennis MD          Today, Patient Was Seen By: Get Bowles PA-C    ** Please Note: Dictation voice to text software may have been used in the creation of this document   **

## 2023-06-18 NOTE — ASSESSMENT & PLAN NOTE
· Patient told me, that she did not have any menstruation for about 3 months, but had menstruation recently  · Possibility the patient is at premenopausal stage now  · Pregnancy test ordered, follow-up results-negative (being asked in the MRI order, as well as patient will be on atorvastatin which is contraindicated in pregnancy)  · Outpatient follow-up with primary care physician

## 2023-06-18 NOTE — ASSESSMENT & PLAN NOTE
· As per neurologist, may now lower down patient's blood pressures  · Patient's blood pressure should be lowered down gradually and not abruptly, due to possibility of TIA/CVA, and at the same time, there is possibility that patient has chronic severe hypertension for a long time, as patient's not on blood pressure medications anymore, due to perceived side effects  · According to the patient, she has history of hypertension and was previously on blood pressure medication, however had some adverse reactions/intolerances  I first discussed with her about starting her on amlodipine, however, patient told me that she had heat sensitivity adverse reaction with that medication  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 taking amlodipine, she would feel a very hot sensation, more than usual, inside her chest every time she would be near a stove or heat, thus, that medication was discontinued  I then discussed with her lisinopril, however she told me that this medication made her bleed more with her menstruation and thus that medication was also discontinued  I then discussed with her losartan, and she told me that this medication gave her palpitations and thus this medication was also discontinued  I explained to her that her adverse effects to the above medications were not typical and possibility not really due to those medications  However, patient was hesitant to start any of those medications  Thus I discussed with her metoprolol and she was okay with this medication, as this medication could also address her occasional palpitations  I initially discussed with her metoprolol tartrate, however, patient prefers on a once a day dosing of medications for better compliance  Thus, we have agreed with metoprolol succinate and I will start at its lowest dose of 25 mg once a day  · IV hydralazine on as-needed basis  · Monitor blood pressures closely  · Adjust treatment accordingly    · BP today slightly improved 171/105, was as high as 231/115 yesterday  BP being lowered gradually  Metoprolol started yesterday  Has IV hydralazine PRN   Continue to monitor closely

## 2023-06-18 NOTE — UTILIZATION REVIEW
Initial Clinical Review    Admission: Date/Time/Statement:   Admission Orders (From admission, onward)     Ordered        06/17/23 1408  Place in Observation  Once                      Orders Placed This Encounter   Procedures   • Place in Observation     Standing Status:   Standing     Number of Occurrences:   1     Order Specific Question:   Level of Care     Answer:   Med Surg [16]     ED Arrival Information     Expected   -    Arrival   6/17/2023 11:34    Acuity   Emergent            Means of arrival   Ambulance    Escorted by   [facility]    Admission type   Emergency            Arrival complaint   -           Chief Complaint   Patient presents with   • STROKE Alert     Was at work when had sudden onset dizziness, left sided weakness, and subjective aphasia  Left arm contracted on arrival  LKW today at 1030  Initial Presentation: 56 y o  female with hx HTN-not on medication due to adverse effect/intolerance , GLENROY, neck pain, cervical radiculopathy who presents to ED from home via EMS with stroke-like symptoms  Pt developed  sudden  Dizziness,  left hand weakness and problems speaking, slowed speech along with headache   On exam in ED,BP elevated, pt anxious, L facial droop and LUE weakness  After some time in ED, all symptoms resolved including HA  Pt reports she had chest pain 2 days ago that spontaneously resolved  Pt reporting palpitations with telemetry showing sinus rhythm  CT head and CTA H/N show nothing acute   Pt given IV Labetalol, ASA  in ED  PT admitted as OBS to telemetry with stroke-like symptoms-possible TIA  HTN  Plan - stroke path- telemetry, neuro consult, MRI brain, echo, check A1c and lipids  ASA and statin   Start Toprol XL daily   PRN IV antihypertensive  Monitor BP  Neuro checks  PT/OT/ST  Neurology consult-DDX- hypertensive urgency and cervical radiculopathy w/ atypical presentation and cannot r/o stroke     Stroke alert was called initial NIH was 3, subsequently during evaluation her symptoms resolved  CT/CTA of the head and neck showed no evidence of any acute intracranial abnormalities or LVO  Recommend lowering the blood pressure as well as admitting patient on stroke pathway   MRI brain and C spine  ECHO  A1c, lipids, start ASA and statin  Permissive HTN x 24 hrs (goal BP < 180/110), labetalol prn  Telemetry, neuro checks    Date: 6/18  Neuro-Pt at baseline neuro status, alert, orintedFacial sensation symmetric  Facial expression full, symmetric  Normal gait  Strength intact and symmetric BUE/BLE   MRI brain awaiting formal read, appears negative for diffusion restriction indicative of acute stroke   PLan - continue ASA for now, continue statin- elevated LDL  Trend towards normotension   Recommend MRI C spine , continue stroke eval for now   F/U echo  Medicine- BP today slightly improved 171/105, was as high as 231/115 yesterday  BP being lowered gradually  Metoprolol started yesterday  Has IV hydralazine PRN  Continue to monitor closely  Pt reports HA, better w/ tylenol         ED Triage Vitals   Temperature Pulse Respirations Blood Pressure SpO2   06/17/23 1245 06/17/23 1135 06/17/23 1135 06/17/23 1135 06/17/23 1135   98 3 °F (36 8 °C) 99 18 (!) 214/111 99 %      Temp Source Heart Rate Source Patient Position - Orthostatic VS BP Location FiO2 (%)   06/17/23 1245 06/17/23 1135 06/17/23 1215 06/17/23 1135 --   Oral Monitor Sitting Right leg       Pain Score       06/17/23 1135       8          Wt Readings from Last 1 Encounters:   06/17/23 62 kg (136 lb 11 oz)     Additional Vital Signs:   Date/Time Temp Pulse Resp BP MAP (mmHg) SpO2   06/18/23 02:56:39 98 3 °F (36 8 °C) 66 18 171/105 Abnormal  127 --   06/18/23 01:02:13 98 4 °F (36 9 °C) 64 19 178/106 Abnormal  130 --   06/17/23 2300 98 2 °F (36 8 °C) 63 18 184/105 Abnormal  131 --   06/17/23 2100 97 3 °F (36 3 °C) Abnormal  65 18 173/112 Abnormal  132 --   06/17/23 15:44:36 98 4 °F (36 9 °C) -- 18 183/117 Abnormal  139 --   06/17/23 1500 98 °F (36 7 °C) 67 18 191/105 Abnormal  141 96 %   06/17/23 1430 -- 78 20 206/124 Abnormal  158 97 %   06/17/23 1400 -- 70 20 197/113 Abnormal  -- 96 %   06/17/23 1345 -- 65 17 173/107 Abnormal  -- 96 %   06/17/23 1330 -- 70 18 175/108 Abnormal  -- 96 %   06/17/23 1315 -- 72 16 181/103 Abnormal  -- 96 %   06/17/23 1300 -- 78 14 189/111 Abnormal  -- 96 %   06/17/23 12:45:17 98 3 °F (36 8 °C) -- -- -- -- --   06/17/23 1245 -- 77 16 206/112 Abnormal  -- 96 %   06/17/23 1230 -- 84 22 187/111 Abnormal  -- 96 %   06/17/23 1215 -- 96 22 231/115 Abnormal  -- 96 %   06/17/23 1200 -- 104 18 216/121 Abnormal  -- 97 %   06/17/23 1155 -- 106 Abnormal  18 210/155 Abnormal  175 96 %   06/17/23 1145 -- 100 18 221/103 Abnormal  149 99 %     Date and Time Eye Opening Best Verbal Response Best Motor Response Dunbar Coma Scale Score   06/18/23 0300 4 5 6 15   06/18/23 0100 4 5 6 15   06/17/23 2300 4 5 6 15   06/17/23 2100 4 5 6 15   06/17/23 1900 4 5 6 15   06/17/23 1800 4 5 6 15   06/17/23 1700 4 5 6 15   06/17/23 1600 4 5 6 15   06/17/23 1400 4 5 6 15   06/17/23 1345 4 5 6 15   06/17/23 1330 4 5 6 15   06/17/23 1315 4 5 6 15   06/17/23 1300 4 5 6 15   06/17/23 1245 4 5 6 15   06/17/23 1230 4 5 6 15   06/17/23 1215 4 5 6 15   06/17/23 1200 4 5 6 15   06/17/23 1145 4 5 6 15   06/17/23 1135 4 5 6 15     Date and Time R Pupil Size (mm) L Pupil Size (mm) R Pupil Reaction L Pupil Reaction   06/18/23 0300 2 2 Brisk Brisk   06/18/23 0100 2 2 Brisk Brisk   06/17/23 2300 2 2 Brisk Brisk   06/17/23 2100 2 2 Brisk Brisk   06/17/23 1900 2 2 Brisk Brisk   06/17/23 1800 2 2 Brisk Brisk   06/17/23 1700 2 2 Brisk Brisk   06/17/23 1600 2 2 Brisk Brisk   06/17/23 1400 3 3 Brisk Brisk   06/17/23 1345 3 3 Brisk Brisk   06/17/23 1330 3 3 Brisk Brisk   06/17/23 1315 3 3 Brisk Brisk   06/17/23 1300 3 3 Brisk Brisk   06/17/23 1245 3 3 Brisk Brisk   06/17/23 1230 3 3 Brisk Brisk   06/17/23 1215 3 3 Brisk Brisk 06/17/23 1200 3 3 Brisk Brisk   06/17/23 1145 3 3 Brisk Brisk   06/17/23 1135 3 3 Brisk Brisk           Pertinent Labs/Diagnostic Test Results:    6/17 ECG-    ECG rate:  101     ECG rate assessment: tachycardic     Rhythm:     Rhythm: sinus tachycardia     Ectopy:     Ectopy: none     QRS:     QRS axis:  Normal     QRS intervals:  Normal   Conduction:     Conduction: normal     ST segments:     ST segments:  Normal   T waves:     T waves: normal     Other findings:     Other findings: LVH     Comments:      Sinus tachycardia 101 bpm, no PACs, no PVCs, increased QRS deflection   in leads V3 through V6 consistent with LVH                CTA stroke alert (head/neck)   Final Result by Rebecca Majano MD (06/17 1200)      No evidence for high-grade stenosis, focal occlusion or vascular aneurysm of the cervical or intracranial vessels  Findings were directly discussed with Snajeev Elliott at 11:50 a m  Workstation performed: [id number]         CT stroke alert brain   Final Result by Rebecca Majano MD (06/17 1158)      No mass effect, acute intracranial hemorrhage or evidence of recent infarction  Findings were directly discussed with Sanjeev Elliott at 11:50 a m  Workstation performed: [id number]         [address]    (6/18/23)  No mass effect, acute intracranial hemorrhage or evidence of recent infarction      Nonspecific scattered white matter FLAIR signal hyperintensity  Differential diagnosis should include demyelinating disease, early chronic microvascular ischemic change, sequelae of Lyme disease as well as vasculitis and other inflammatory etiologies               Results from last 7 days   Lab Units 06/18/23  0444 06/17/23  1918 06/17/23  1205   WBC Thousand/uL 3 79*  --  5 46   HEMOGLOBIN g/dL 13 1  --  12 8   HEMATOCRIT % 41 0  --  40 8   PLATELETS Thousands/uL 183 176 192         Results from last 7 days   Lab Units 06/18/23  0444 06/17/23  1205   SODIUM "mmol/L 140 137   POTASSIUM mmol/L 3 3* 3 6   CHLORIDE mmol/L 106 104   CO2 mmol/L 29 28   ANION GAP mmol/L 5 5   BUN mg/dL 9 13   CREATININE mg/dL 0 70 0 91   EGFR ml/min/1 73sq m 104 75   CALCIUM mg/dL 8 9 8 8         Results from last 7 days   Lab Units 06/17/23  1157   POC GLUCOSE mg/dl 107     Results from last 7 days   Lab Units 06/18/23  0444 06/17/23  1205   GLUCOSE RANDOM mg/dL 105 99             No results found for: \"BETA-HYDROXYBUTYRATE\"                   Results from last 7 days   Lab Units 06/17/23  1628 06/17/23  1414 06/17/23  1205   HS TNI 0HR ng/L  --   --  4   HS TNI 2HR ng/L  --  5  --    HSTNI D2 ng/L  --  1  --    HS TNI 4HR ng/L 5  --   --    HSTNI D4 ng/L 1  --   --          Results from last 7 days   Lab Units 06/17/23  1205   PROTIME seconds 14 0   INR  1 06   PTT seconds 30                             Results from last 7 days   Lab Units 06/17/23  1205   FERRITIN ng/mL 11   IRON SATURATION % 15   IRON ug/dL 61   TIBC ug/dL 411             ED Treatment:   Medication Administration from 06/17/2023 1125 to 06/17/2023 1538       Date/Time Order Dose Route Action     06/17/2023 1143 EDT iohexol (OMNIPAQUE) 350 MG/ML injection (SINGLE-DOSE) 85 mL 85 mL Intravenous Given     06/17/2023 1246 EDT labetalol (NORMODYNE) injection 10 mg 10 mg Intravenous Given     06/17/2023 1253 EDT aspirin chewable tablet 324 mg 324 mg Oral Given        Past Medical History:   Diagnosis Date   • Hypertension    • Iron deficiency      Present on Admission:  • Stroke-like symptoms  • Hypertension  • History of iron deficiency  • Irregular menses      Admitting Diagnosis: Left arm weakness [R29 898]  Stroke-like symptoms [R29 90]  Age/Sex: 56 y o  female  Admission Orders:  Scheduled Medications:  aspirin, 81 mg, Oral, Daily  atorvastatin, 40 mg, Oral, QPM  enoxaparin, 40 mg, Subcutaneous, Daily  metoprolol succinate, 25 mg, Oral, Daily    potassium chloride (K-DUR,KLOR-CON) CR tablet 20 mEq  Dose: 20 mEq  Freq:  Once " Route: PO  Start: 06/18/23 0800 End: 06/18/23 0816  Continuous IV Infusions:     PRN Meds:  acetaminophen, 650 mg, Oral, Q4H PRN x1 6/18  hydrALAZINE, 5 mg, Intravenous, Q6H PRN  ondansetron, 4 mg, Intravenous, Q6H PRN  senna, 1 tablet, Oral, HS PRN    cardiovasc , lo fat diet   telemetry   neuro checks   NIHSS   SCD's   OOB as melody w/ assist    IP CONSULT TO NEUROLOGY  IP CONSULT TO CASE MANAGEMENT  IP CONSULT TO NUTRITION SERVICES    Network Utilization Review Department  ATTENTION: Please call with any questions or concerns to [phone] and carefully listen to the prompts so that you are directed to the right person  All voicemails are confidential   Shelda Barefoot all requests for admission clinical reviews, approved or denied determinations and any other requests to dedicated fax number below belonging to the campus where the patient is receiving treatment   List of dedicated fax numbers for the Facilities:  [address] DENIALS (Administrative/Medical Necessity) [phone]   [address] (Maternity/NICU/Pediatrics) [phone]   7 Sara Sánchez [phone]   [facility] 77 [phone]   [address] [place] [address] Javier Tay 28 [phone]   [address] [place] [address] [phone]

## 2023-06-18 NOTE — ASSESSMENT & PLAN NOTE
· Patient came in with left hand weakness, dizziness and problems speaking, described as slow in in her speech, that happened earlier this morning  All of these symptoms had resolved  · Possible TIA  · Neurologist consulted  Appreciate recommendations  Continue stroke pathway for now  MRI pending  · Aspirin and atorvastatin  From my discussion with the patient, she does not have any intentions of getting pregnant and thus no contraindication for the statin medication at this point  Follow-up pregnancy test results- negative  · As per neurologist, okay with lowering the blood pressure at this point  Thus with possibility of CVA/TIA, I will lower down patient's blood pressure gradually and not abruptly  This was discussed with the patient, okay to start with Toprol-XL 25 mg once a day for now  Patient will also be on IV blood pressure medication as needed basis  Please see further discussions and plans under hypertension  · Neurochecks  · MRI of the brain  (Follow-up pregnancy test result- negative, as MRI order asked if patient is pregnant or not)  · Echocardiogram (aside from TIA/stroke work-up, patient also had chest pain 2 days ago and I heard a questionable faint heart murmur)  ECHO pending  · A1c, fasting lipid profile  · PT, OT, ST evaluations and management    · Does complain of headache this AM, better with tylenol

## 2023-06-18 NOTE — CASE MANAGEMENT
Case Management Assessment & Discharge Planning Note    Patient name Memo Ibrahim S /S -01 MRN [id number]  : 1975 Date 2023       Current Admission Date: 2023  Current Admission Diagnosis:Stroke-like symptoms   Patient Active Problem List    Diagnosis Date Noted   • Stroke-like symptoms 2023   • Hypertension 2023   • History of iron deficiency 2023   • Irregular menses 2023      LOS (days): 0  Geometric Mean LOS (GMLOS) (days):   Days to GMLOS:     OBJECTIVE:              Current admission status: Observation  Referral Reason: Stroke    Preferred Pharmacy: No Pharmacies Listed  Primary Care Provider: Niko Knight MD    [facility] Insurance: [employer] [facility]  Secondary Insurance:     ASSESSMENT:    Readmission Root Cause  30 Day Readmission: No    Patient Information  Admitted from[de-identified] Home  Mental Status: Alert  During Assessment patient was accompanied by: Daughter  Assessment information provided by[de-identified] Patient  Primary Caregiver: Self  Support Systems: Self, Spouse/significant other, Family members  [place] of Residence: [address] [place] [address] do you live in?: [address] [facility] entry access options  Select all that apply : Stairs  Number of steps to enter home  : 1  Do the steps have railings?: Yes  Type of Current Residence: [address] [place] home  Upon entering residence, is there a bedroom on the main floor (no further steps)?: Yes  Upon entering residence, is there a bathroom on the main floor (no further steps)?: Yes  In the last 12 months, was there a time when you were not able to pay the mortgage or rent on time?: No  In the last 12 months, how many places have you lived?: 1  In the last 12 months, was there a time when you did not have a steady place to sleep or slept in a shelter (including now)?: No  Homeless/housing insecurity resource given?: No  Living Arrangements: Lives w/ Spouse/significant other, Lives w/ Extended Family  Is patient a ?: No    Activities of Daily Living Prior to Admission  Functional Status: Independent  Completes ADLs independently?: Yes  Ambulates independently?: Yes  Does patient use assisted devices?: No  Does patient currently own DME?: No  Does patient have a history of Outpatient Therapy (PT/OT)?: Yes  Does the patient have a history of Short-Term Rehab?: No  Does patient have a history of HHC?: No  Does patient currently have Guillermo Ghosh?: No    Patient Information Continued  Income Source: Employed  Does patient have prescription coverage?: Yes  Within the past 12 months, you worried that your food would run out before you got the money to buy more : Never true  Within the past 12 months, the food you bought just didn't last and you didn't have money to get more : Never true  Food insecurity resource given?: No  Does patient receive dialysis treatments?: No  Does patient have a history of substance abuse?: No  Does patient have a history of Mental Health Diagnosis?: No    PHQ 2/9 Screening   Reviewed PHQ 2/9 Depression Screening Score?: No    Means of Transportation  Means of Transport to Appts[de-identified] Drives Self  In the past 12 months, has lack of transportation kept you from medical appointments or from getting medications?: No  In the past 12 months, has lack of transportation kept you from meetings, work, or from getting things needed for daily living?: No  Was application for public transport provided?: No    DISCHARGE DETAILS:    Discharge planning discussed with[de-identified] Patient  Freedom of Choice: Yes     CM contacted family/caregiver?: Yes  Were Treatment Team discharge recommendations reviewed with patient/caregiver?: Yes  Did patient/caregiver verbalize understanding of patient care needs?: Yes  Were patient/caregiver advised of the risks associated with not following Treatment Team discharge recommendations?: Yes    Contacts  Patient Contacts: Friends bedside  Relationship to Patient[de-identified] Friend  Contact Method:  In Person  Reason/Outcome: Emergency Contact, Discharge [address]         Is the patient interested in [facility] at discharge?: No    DME Referral Provided  Referral made for DME?: No    Other Referral/Resources/Interventions Provided:  Interventions: Declines resources, None Indicated    Would you like to participate in our [address] service program?  : No - Declined    Treatment Team Recommendation: Home  Discharge Destination Plan[de-identified] Home  Transport at Discharge : Family

## 2023-06-19 LAB
ANION GAP SERPL CALCULATED.3IONS-SCNC: 7 MMOL/L (ref 4–13)
ATRIAL RATE: 101 BPM
BUN SERPL-MCNC: 13 MG/DL (ref 5–25)
CALCIUM SERPL-MCNC: 8.9 MG/DL (ref 8.4–10.2)
CHLORIDE SERPL-SCNC: 99 MMOL/L (ref 96–108)
CO2 SERPL-SCNC: 24 MMOL/L (ref 21–32)
CREAT SERPL-MCNC: 0.61 MG/DL (ref 0.6–1.3)
ERYTHROCYTE [DISTWIDTH] IN BLOOD BY AUTOMATED COUNT: 18.5 % (ref 11.6–15.1)
GFR SERPL CREATININE-BSD FRML MDRD: 109 ML/MIN/1.73SQ M
GLUCOSE SERPL-MCNC: 100 MG/DL (ref 65–140)
GLUCOSE SERPL-MCNC: 119 MG/DL (ref 65–140)
GLUCOSE SERPL-MCNC: 130 MG/DL (ref 65–140)
HCT VFR BLD AUTO: 42.2 % (ref 34.8–46.1)
HGB BLD-MCNC: 13.3 G/DL (ref 11.5–15.4)
MCH RBC QN AUTO: 24.5 PG (ref 26.8–34.3)
MCHC RBC AUTO-ENTMCNC: 31.5 G/DL (ref 31.4–37.4)
MCV RBC AUTO: 78 FL (ref 82–98)
P AXIS: 56 DEGREES
PLATELET # BLD AUTO: 205 THOUSANDS/UL (ref 149–390)
PMV BLD AUTO: 9.9 FL (ref 8.9–12.7)
POTASSIUM SERPL-SCNC: 3.6 MMOL/L (ref 3.5–5.3)
PR INTERVAL: 196 MS
QRS AXIS: -4 DEGREES
QRSD INTERVAL: 100 MS
QT INTERVAL: 384 MS
QTC INTERVAL: 497 MS
RBC # BLD AUTO: 5.42 MILLION/UL (ref 3.81–5.12)
SODIUM SERPL-SCNC: 130 MMOL/L (ref 135–147)
T WAVE AXIS: 79 DEGREES
VENTRICULAR RATE: 101 BPM
WBC # BLD AUTO: 7.26 THOUSAND/UL (ref 4.31–10.16)

## 2023-06-19 PROCEDURE — 93010 ELECTROCARDIOGRAM REPORT: CPT | Performed by: INTERNAL MEDICINE

## 2023-06-19 PROCEDURE — 85027 COMPLETE CBC AUTOMATED: CPT | Performed by: PHYSICIAN ASSISTANT

## 2023-06-19 PROCEDURE — 82948 REAGENT STRIP/BLOOD GLUCOSE: CPT

## 2023-06-19 PROCEDURE — 80048 BASIC METABOLIC PNL TOTAL CA: CPT | Performed by: PHYSICIAN ASSISTANT

## 2023-06-19 PROCEDURE — 99232 SBSQ HOSP IP/OBS MODERATE 35: CPT

## 2023-06-19 RX ORDER — BUTALBITAL, ACETAMINOPHEN AND CAFFEINE 50; 325; 40 MG/1; MG/1; MG/1
1 TABLET ORAL EVERY 4 HOURS PRN
Status: DISCONTINUED | OUTPATIENT
Start: 2023-06-19 | End: 2023-06-20 | Stop reason: HOSPADM

## 2023-06-19 RX ORDER — LABETALOL HYDROCHLORIDE 5 MG/ML
10 INJECTION, SOLUTION INTRAVENOUS EVERY 6 HOURS PRN
Status: DISCONTINUED | OUTPATIENT
Start: 2023-06-19 | End: 2023-06-20 | Stop reason: HOSPADM

## 2023-06-19 RX ORDER — METOPROLOL SUCCINATE 50 MG/1
50 TABLET, EXTENDED RELEASE ORAL DAILY
Status: DISCONTINUED | OUTPATIENT
Start: 2023-06-19 | End: 2023-06-20 | Stop reason: HOSPADM

## 2023-06-19 RX ADMIN — ASPIRIN 81 MG 81 MG: 81 TABLET ORAL at 07:56

## 2023-06-19 RX ADMIN — METOPROLOL SUCCINATE 50 MG: 50 TABLET, EXTENDED RELEASE ORAL at 07:56

## 2023-06-19 RX ADMIN — ENOXAPARIN SODIUM 40 MG: 40 INJECTION SUBCUTANEOUS at 07:57

## 2023-06-19 RX ADMIN — ACETAMINOPHEN 650 MG: 325 TABLET ORAL at 06:02

## 2023-06-19 RX ADMIN — ATORVASTATIN CALCIUM 40 MG: 40 TABLET, FILM COATED ORAL at 16:54

## 2023-06-19 RX ADMIN — BUTALBITAL, ACETAMINOPHEN, AND CAFFEINE 1 TABLET: 325; 50; 40 TABLET ORAL at 08:06

## 2023-06-19 NOTE — PROGRESS NOTES
[facility]  Progress Note  Name: Elner Schwab  MRN: [id number]  Unit/Bed#: S -01 I Date of Admission: 6/17/2023   Date of Service: 6/19/2023 I Hospital Day: 0    Assessment/Plan   * Stroke-like symptoms  Assessment & Plan  · Patient came in with left hand weakness, dizziness and problems speaking, described as slow in in her speech  All of these symptoms had resolved  · Possible TIA  · Neurologist consulted  Believe patient likely had a TIA  Cleared for discharge follow up outpatient in 3-4 weeks  · Continue aspirin and atorvastatin  · Normotension recommended at this time increase Toprol to 50 mg daily  · Neurochecks  · MRI of the brain unremarkable for acute stroke  · Echocardiogram severe concentric hypertrophy  Systolic function is normal  Wall motion is normal  Diastolic function is mildly abnormal  · A1c 5 8  · PT, OT, ST no rehab needs      Hypertension  Assessment & Plan  · Normotension recommended  · Patient's blood pressure is being lowered down gradually and not abruptly, due to possibility of TIA/CVA, and at the same time, there is possibility that patient has chronic severe hypertension for a long time, as patient's not on blood pressure medications anymore, due to perceived side effects  · Patient reported a history of  hypertension and was previously on blood pressure medication, however had some adverse reactions/intolerances  · Amlodipine caused a heat sensitivity adverse reaction with that medication  · Lisinopril made her bleed more with her menstruation and thus that medication was also discontinued  · Losartan gave her palpitations and thus this medication was also discontinued  · Continue metoprolol succinate increase from 25 mg to 50 mg   · IV hydralazine caused patient to have headache last, patient refusing to take again will add labetalol as needed instead  · Monitor blood pressures closely  · Adjust treatment accordingly      Irregular menses  Assessment & Plan  · Patient told me, that she did not have any menstruation for about 3 months, but had menstruation recently  · Possibility the patient is at premenopausal stage now  · Pregnancy test negative   · Outpatient follow-up with primary care physician  History of iron deficiency  Assessment & Plan  · Patient was recently diagnosed with iron deficiency by her primary care physician at [facility] family medicine practice  She was prescribed with an iron pill  · Hemoglobin is normal   · Iron panel normal  · Hold off iron medication (ferrous sulfate) for now         VTE Pharmacologic Prophylaxis: VTE Score: 6 High Risk (Score >/= 5) - Pharmacological DVT Prophylaxis Ordered: enoxaparin (Lovenox)  Sequential Compression Devices Ordered  Patient Centered Rounds: I performed bedside rounds with nursing staff today  Discussions with Specialists or Other Care Team Provider: none    Education and Discussions with Family / Patient: Updated  (sponser from [employer]) via phone  Total Time Spent on Date of Encounter in care of patient: 45 minutes This time was spent on one or more of the following: performing physical exam; counseling and coordination of care; obtaining or reviewing history; documenting in the medical record; reviewing/ordering tests, medications or procedures; communicating with other healthcare professionals and discussing with patient's family/caregivers  Current Length of Stay: 0 day(s)  Current Patient Status: Observation   Certification Statement: The patient will continue to require additional inpatient hospital stay due to hypertension requiring medication titatrion   Discharge Plan: Anticipate discharge tomorrow to home  Code Status: Level 1 - Full Code    Subjective:   Patient was seen laying in bed today  She reports having a burning sensation after receiving hydralazine last night  She describes a frontal headache and dizziness   This resolved but the headache remains though it is improved from when it began patient denies any other acute complaints    Objective:     Vitals:   Temp (24hrs), Av °F (36 7 °C), Min:97 6 °F (36 4 °C), Max:98 3 °F (36 8 °C)    Temp:  [97 6 °F (36 4 °C)-98 3 °F (36 8 °C)] 97 6 °F (36 4 °C)  HR:  [65-82] 65  Resp:  [18-20] 20  BP: (133-191)/() 180/110  SpO2:  [94 %-98 %] 98 %  Body mass index is 25 7 kg/m²  Input and Output Summary (last 24 hours):   No intake or output data in the 24 hours ending 23 0858    Physical Exam:   Physical Exam  Vitals and nursing note reviewed  Constitutional:       Appearance: Normal appearance  HENT:      Head: Normocephalic and atraumatic  Eyes:      General: No scleral icterus  Cardiovascular:      Rate and Rhythm: Normal rate and regular rhythm  Pulmonary:      Effort: Pulmonary effort is normal       Breath sounds: Normal breath sounds  Abdominal:      General: Abdomen is flat  Bowel sounds are normal       Palpations: Abdomen is soft  Musculoskeletal:      Right lower leg: No edema  Left lower leg: No edema  Skin:     General: Skin is warm and dry  Neurological:      General: No focal deficit present  Mental Status: Mental status is at baseline        Comments: Sensation intact to light touch, finger to nose intact, tongue is midline   Psychiatric:         Mood and Affect: Mood normal          Additional Data:     Labs:  Results from last 7 days   Lab Units 23  0541   WBC Thousand/uL 7 26   HEMOGLOBIN g/dL 13 3   HEMATOCRIT % 42 2   PLATELETS Thousands/uL 205     Results from last 7 days   Lab Units 23  0541   SODIUM mmol/L 130*   POTASSIUM mmol/L 3 6   CHLORIDE mmol/L 99   CO2 mmol/L 24   BUN mg/dL 13   CREATININE mg/dL 0 61   ANION GAP mmol/L 7   CALCIUM mg/dL 8 9   GLUCOSE RANDOM mg/dL 130     Results from last 7 days   Lab Units 23  1205   INR  1 06     Results from last 7 days   Lab Units 23  1157   POC GLUCOSE mg/dl 107 Results from last 7 days   Lab Units 06/18/23  0444   HEMOGLOBIN A1C % 5 8*           Lines/Drains:  Invasive Devices     Peripheral Intravenous Line  Duration           Peripheral IV 06/17/23 Right Antecubital 1 day                      Imaging: Reviewed radiology reports from this admission including: MRI brain    Recent Cultures (last 7 days):         Last 24 Hours Medication List:   Current Facility-Administered Medications   Medication Dose Route Frequency Provider Last Rate   • acetaminophen  650 mg Oral Q4H PRN Jimbo Renee MD     • aspirin  81 mg Oral Daily Svitlana Ramsay MD     • atorvastatin  40 mg Oral QPM Jimbo Renee MD     • butalbital-acetaminophen-caffeine  1 tablet Oral Q4H PRN Jeimy Stephenson PA-C     • enoxaparin  40 mg Subcutaneous Daily Svitlana Ramsay MD     • labetalol  10 mg Intravenous Q6H PRN Jeimy Stephenson PA-C     • metoprolol succinate  50 mg Oral Daily Sandra Ornelas PA-C     • ondansetron  4 mg Intravenous Q6H PRN Svitlana Ramsay MD     • senna  1 tablet Oral HS PRN Edith Mcneil MD          Today, Patient Was Seen By: Jeimy Stephenson PA-C    **Please Note: This note may have been constructed using a voice recognition system  **

## 2023-06-19 NOTE — OCCUPATIONAL THERAPY NOTE
Occupational Therapy Screen Note         Patient Name: Mar Palm  [id number] Date: 6/19/2023 06/19/23 0748   OT Last Visit   OT Visit Date 06/19/23   Note Type   Note type Screen   Additional Comments OT orders received  Chart review performed  Based on conversation with PT Prem Jones, pt appears to be performing at functional baseline  Pt has been (I) in room and getting to/from bathroom without assistance  Pt does not demonstrate need for skilled OT at this time  Pt will not be seen further by OT services  DC OT orders  If pt's functional status declines please re-consult       Galilea Lozano MS, OTR/L

## 2023-06-19 NOTE — ASSESSMENT & PLAN NOTE
· Patient came in with left hand weakness, dizziness and problems speaking, described as slow in in her speech  All of these symptoms had resolved  · Possible TIA  · Neurologist consulted  Believe patient likely had a TIA  Cleared for discharge follow up outpatient in 3-4 weeks  · Continue aspirin and atorvastatin  · Normotension recommended at this time increase Toprol to 50 mg daily  · Neurochecks  · MRI of the brain unremarkable for acute stroke  · Echocardiogram severe concentric hypertrophy   Systolic function is normal  Wall motion is normal  Diastolic function is mildly abnormal  · A1c 5 8  · PT, OT, ST no rehab needs

## 2023-06-19 NOTE — ASSESSMENT & PLAN NOTE
· Normotension recommended  · Patient's blood pressure is being lowered down gradually and not abruptly, due to possibility of TIA/CVA, and at the same time, there is possibility that patient has chronic severe hypertension for a long time, as patient's not on blood pressure medications anymore, due to perceived side effects  · Patient reported a history of  hypertension and was previously on blood pressure medication, however had some adverse reactions/intolerances  · Amlodipine caused a heat sensitivity adverse reaction with that medication  · Lisinopril made her bleed more with her menstruation and thus that medication was also discontinued  · Losartan gave her palpitations and thus this medication was also discontinued  · Continue metoprolol succinate increase from 25 mg to 50 mg   · IV hydralazine caused patient to have headache last, patient refusing to take again will add labetalol as needed instead  · Monitor blood pressures closely  · Adjust treatment accordingly

## 2023-06-19 NOTE — ASSESSMENT & PLAN NOTE
· Patient told me, that she did not have any menstruation for about 3 months, but had menstruation recently  · Possibility the patient is at premenopausal stage now  · Pregnancy test negative   · Outpatient follow-up with primary care physician

## 2023-06-19 NOTE — ASSESSMENT & PLAN NOTE
· Patient was recently diagnosed with iron deficiency by her primary care physician at [facility] practice  She was prescribed with an iron pill     · Hemoglobin is normal   · Iron panel normal  · Hold off iron medication (ferrous sulfate) for now

## 2023-06-19 NOTE — UTILIZATION REVIEW
Initial Clinical Review completed by Allegra Allan RN Utilization Review     Admission: Date/Time/Statement:       Admission Orders (From admission, onward)       Ordered         06/17/23 1408   Place in Observation  Once                               Orders Placed This Encounter   Procedures   • Place in Observation       Standing Status:   Standing       Number of Occurrences:   1       Order Specific Question:   Level of Care       Answer:   Med Surg [16]               ED Arrival Information               Expected   -    Arrival   6/17/2023 11:34    Acuity   Emergent              Means of arrival   Ambulance    Escorted by   [address]    Admission type   Emergency              Arrival complaint   -                  Chief Complaint   Patient presents with   • STROKE Alert       Was at work when had sudden onset dizziness, left sided weakness, and subjective aphasia  Left arm contracted on arrival  Cristi Alert today at 1030          Initial Presentation: 56 y o  female with hx HTN-not on medication due to adverse effect/intolerance , GLENROY, neck pain, cervical radiculopathy who presents to ED from home via EMS with stroke-like symptoms  Pt developed  sudden  Dizziness,  left hand weakness and problems speaking, slowed speech along with headache   On exam in ED,BP elevated, pt anxious, L facial droop and LUE weakness  After some time in ED, all symptoms resolved including HA  Pt reports she had chest pain 2 days ago that spontaneously resolved  Pt reporting palpitations with telemetry showing sinus rhythm  CT head and CTA H/N show nothing acute   Pt given IV Labetalol, ASA  in ED  PT admitted as OBS to telemetry with stroke-like symptoms-possible TIA  HTN  Plan - stroke path- telemetry, neuro consult, MRI brain, echo, check A1c and lipids  ASA and statin   Start Toprol XL daily   PRN IV antihypertensive  Monitor BP  Neuro checks    PT/OT/ST      Neurology consult-DDX- hypertensive urgency and cervical radiculopathy w/ atypical presentation and cannot r/o stroke   Stroke alert was called initial NIH was 3, subsequently during evaluation her symptoms resolved   CT/CTA of the head and neck showed no evidence of any acute intracranial abnormalities or LVO  Recommend lowering the blood pressure as well as admitting patient on stroke pathway   MRI brain and C spine  ECHO  A1c, lipids, start ASA and statin  Permissive HTN x 24 hrs (goal BP < 180/110), labetalol prn  Telemetry, neuro checks     Date: 6/18  Neuro-Pt at baseline neuro status, alert, orintedFacial sensation symmetric  Facial expression full, symmetric  Normal gait   Strength intact and symmetric BUE/BLE   MRI brain awaiting formal read, appears negative for diffusion restriction indicative of acute stroke   PLan - continue ASA for now, continue statin- elevated LDL  Trend towards normotension   Recommend MRI C spine , continue stroke eval for now   F/U echo  Medicine- BP today slightly improved 171/105, was as high as 231/115 yesterday  BP being lowered gradually  Metoprolol started yesterday  Has IV hydralazine PRN  Continue to monitor closely   Pt reports HA, better w/ tylenol                 ED Triage Vitals   Temperature Pulse Respirations Blood Pressure SpO2   06/17/23 1245 06/17/23 1135 06/17/23 1135 06/17/23 1135 06/17/23 1135   98 3 °F (36 8 °C) 99 18 (!) 214/111 99 %       Temp Source Heart Rate Source Patient Position - Orthostatic VS BP Location FiO2 (%)   06/17/23 1245 06/17/23 1135 06/17/23 1215 06/17/23 1135 --   Oral Monitor Sitting Right leg         Pain Score           06/17/23 1135           8                  Wt Readings from Last 1 Encounters:   06/17/23 62 kg (136 lb 11 oz)      Additional Vital Signs:   Date/Time Temp Pulse Resp BP MAP (mmHg) SpO2   06/18/23 02:56:39 98 3 °F (36 8 °C) 66 18 171/105 Abnormal  127 --   06/18/23 01:02:13 98 4 °F (36 9 °C) 64 19 178/106 Abnormal  130 --   06/17/23 2300 98 2 °F (36 8 °C) 63 18 184/105 Abnormal  131 --   06/17/23 2100 97 3 °F (36 3 °C) Abnormal  65 18 173/112 Abnormal  132 --   06/17/23 15:44:36 98 4 °F (36 9 °C) -- 18 183/117 Abnormal  139 --   06/17/23 1500 98 °F (36 7 °C) 67 18 191/105 Abnormal  141 96 %   06/17/23 1430 -- 78 20 206/124 Abnormal  158 97 %   06/17/23 1400 -- 70 20 197/113 Abnormal  -- 96 %   06/17/23 1345 -- 65 17 173/107 Abnormal  -- 96 %   06/17/23 1330 -- 70 18 175/108 Abnormal  -- 96 %   06/17/23 1315 -- 72 16 181/103 Abnormal  -- 96 %   06/17/23 1300 -- 78 14 189/111 Abnormal  -- 96 %   06/17/23 12:45:17 98 3 °F (36 8 °C) -- -- -- -- --   06/17/23 1245 -- 77 16 206/112 Abnormal  -- 96 %   06/17/23 1230 -- 84 22 187/111 Abnormal  -- 96 %   06/17/23 1215 -- 96 22 231/115 Abnormal  -- 96 %   06/17/23 1200 -- 104 18 216/121 Abnormal  -- 97 %   06/17/23 1155 -- 106 Abnormal  18 210/155 Abnormal  175 96 %   06/17/23 1145 -- 100 18 221/103 Abnormal  149 99 %      Date and Time Eye Opening Best Verbal Response Best Motor Response [place] Coma Scale Score   06/18/23 0300 4 5 6 15   06/18/23 0100 4 5 6 15   06/17/23 2300 4 5 6 15   06/17/23 2100 4 5 6 15   06/17/23 1900 4 5 6 15   06/17/23 1800 4 5 6 15   06/17/23 1700 4 5 6 15   06/17/23 1600 4 5 6 15   06/17/23 1400 4 5 6 15   06/17/23 1345 4 5 6 15   06/17/23 1330 4 5 6 15   06/17/23 1315 4 5 6 15   06/17/23 1300 4 5 6 15   06/17/23 1245 4 5 6 15   06/17/23 1230 4 5 6 15   06/17/23 1215 4 5 6 15   06/17/23 1200 4 5 6 15   06/17/23 1145 4 5 6 15   06/17/23 1135 4 5 6 15      Date and Time R Pupil Size (mm) L Pupil Size (mm) R Pupil Reaction L Pupil Reaction   06/18/23 0300 2 2 Brisk Brisk   06/18/23 0100 2 2 Brisk Brisk   06/17/23 2300 2 2 Brisk Brisk   06/17/23 2100 2 2 Brisk Brisk   06/17/23 1900 2 2 Brisk Brisk   06/17/23 1800 2 2 Brisk Brisk   06/17/23 1700 2 2 Brisk Brisk   06/17/23 1600 2 2 Brisk Brisk   06/17/23 1400 3 3 Brisk Brisk   06/17/23 1345 3 3 Brisk Brisk   06/17/23 1330 3 3 Brisk Brisk   06/17/23 1315 3 3 Brisk Brisk   06/17/23 1300 3 3 Brisk Brisk   06/17/23 1245 3 3 Brisk Brisk   06/17/23 1230 3 3 Brisk Brisk   06/17/23 1215 3 3 Brisk Brisk   06/17/23 1200 3 3 Brisk Brisk   06/17/23 1145 3 3 Brisk Brisk   06/17/23 1135 3 3 Brisk Brisk               Pertinent Labs/Diagnostic Test Results:    6/17 ECG-        ECG rate:  101     ECG rate assessment: tachycardic     Rhythm:     Rhythm: sinus tachycardia     Ectopy:     Ectopy: none     QRS:     QRS axis:  Normal     QRS intervals:  Normal   Conduction:     Conduction: normal     ST segments:     ST segments:  Normal   T waves:     T waves: normal     Other findings:     Other findings: LVH     Comments:      Sinus tachycardia 101 bpm, no PACs, no PVCs, increased QRS deflection   in leads V3 through V6 consistent with LVH                    CTA stroke alert (head/neck)   Final Result by Mary Betts MD (06/17 1200)       No evidence for high-grade stenosis, focal occlusion or vascular aneurysm of the cervical or intracranial vessels                Findings were directly discussed with Keegan Feliz at 11:50 a m                                    Workstation performed: [id number]           CT stroke alert brain   Final Result by Mary Betts MD (06/17 1158)       No mass effect, acute intracranial hemorrhage or evidence of recent infarction        Findings were directly discussed with Keegan Feliz at 11:50 a m        Workstation performed: [id number]           MRI-brain  Inpatient Order    (6/18/23)  No mass effect, acute intracranial hemorrhage or evidence of recent infarction      Nonspecific scattered white matter FLAIR signal hyperintensity  Differential diagnosis should include demyelinating disease, early chronic microvascular ischemic change, sequelae of Lyme disease as well as vasculitis and other inflammatory etiologies                         Results from last 7 days   Lab Units 06/18/23  0444 06/17/23  1918 06/17/23  1205   WBC Thousand/uL 3 79*  --  5 46 HEMOGLOBIN g/dL 13 1  --  12 8   HEMATOCRIT % 41 0  --  40 8   PLATELETS Thousands/uL 183 176 192                Results from last 7 days   Lab Units 06/18/23  0444 06/17/23  1205   SODIUM mmol/L 140 137   POTASSIUM mmol/L 3 3* 3 6   CHLORIDE mmol/L 106 104   CO2 mmol/L 29 28   ANION GAP mmol/L 5 5   BUN mg/dL 9 13   CREATININE mg/dL 0 70 0 91   EGFR ml/min/1 73sq m 104 75   CALCIUM mg/dL 8 9 8 8               Results from last 7 days   Lab Units 06/17/23  1157   POC GLUCOSE mg/dl 107            Results from last 7 days   Lab Units 06/18/23  0444 06/17/23  1205   GLUCOSE RANDOM mg/dL 105 99                       Results from last 7 days   Lab Units 06/17/23  1628 06/17/23  1414 06/17/23  1205   HS TNI 0HR ng/L  --   --  4   HS TNI 2HR ng/L  --  5  --    HSTNI D2 ng/L  --  1  --    HS TNI 4HR ng/L 5  --   --    HSTNI D4 ng/L 1  --   --                Results from last 7 days   Lab Units 06/17/23  1205   PROTIME seconds 14 0   INR   1 06   PTT seconds 30                 Results from last 7 days   Lab Units 06/17/23  1205   FERRITIN ng/mL 11   IRON SATURATION % 15   IRON ug/dL 61   TIBC ug/dL 411              ED Treatment:             Medication Administration from 06/17/2023 1125 to 06/17/2023 1538        Date/Time Order Dose Route Action       06/17/2023 1143 EDT iohexol (OMNIPAQUE) 350 MG/ML injection (SINGLE-DOSE) 85 mL 85 mL Intravenous Given       06/17/2023 1246 EDT labetalol (NORMODYNE) injection 10 mg 10 mg Intravenous Given       06/17/2023 1253 EDT aspirin chewable tablet 324 mg 324 mg Oral Given          Medical History        Past Medical History:   Diagnosis Date   • Hypertension     • Iron deficiency           Present on Admission:  • Stroke-like symptoms  • Hypertension  • History of iron deficiency  • Irregular menses        Admitting Diagnosis: Left arm weakness [R29 898]  Stroke-like symptoms [R29 90]  Age/Sex: 56 y o  female  Admission Orders:  Scheduled Medications:  aspirin, 81 mg, Oral, Daily  atorvastatin, 40 mg, Oral, QPM  enoxaparin, 40 mg, Subcutaneous, Daily  metoprolol succinate, 25 mg, Oral, Daily     potassium chloride (K-DUR,KLOR-CON) CR tablet 20 mEq  Dose: 20 mEq  Freq: Once Route: PO  Start: 06/18/23 0800 End: 06/18/23 0816  Continuous IV Infusions:  PRN Meds:  acetaminophen, 650 mg, Oral, Q4H PRN x1 6/18  hydrALAZINE, 5 mg, Intravenous, Q6H PRN  ondansetron, 4 mg, Intravenous, Q6H PRN  senna, 1 tablet, Oral, HS PRN     cardiovasc , lo fat diet   telemetry   neuro checks   NIHSS   SCD's   OOB as melody w/ assist     IP CONSULT TO NEUROLOGY  IP CONSULT TO CASE MANAGEMENT  IP CONSULT TO NUTRITION SERVICES        Initial Inpatient Review    Observation 6/17 1408 changed to inpatient 6/19 1409  Pt requiring continued stay for hypertension requiring medication titration  Date: 6/19-6/20/23                          Current Patient Class: IP  Current Level of Care: Med Surg    Admission Orders (From admission, onward)     Ordered        06/19/23 1409  Inpatient Admission  Once            06/17/23 1408  Place in Observation  Once                      Orders Placed This Encounter   Procedures   • Inpatient Admission     Standing Status:   Standing     Number of Occurrences:   1     Order Specific Question:   Level of Care     Answer:   Med Surg [16]     Order Specific Question:   Estimated length of stay     Answer:   More than 2 Midnights     Order Specific Question:   Certification     Answer:   I certify that inpatient services are medically necessary for this patient for a duration of greater than two midnights  See H&P and MD Progress Notes for additional information about the patient's course of treatment  HPI:47 y o  female initially admitted on 6/19     Assessment/Plan:      6/19 Observation changed to inpatient  Internal medicine: MRI of the brain unremarkable for acute stroke  Echocardiogram severe concentric hypertrophy   Systolic function is normal  Wall motion is normal  Diastolic function is mildly abnormal  Patient reported a history of  hypertension and was previously on blood pressure medication, however had some adverse reactions/intolerances  Pt with adverse reactions to amlodipine, lisinopril and losartan  Continue metoprolol succinate increase from 25 mg to 50 mg  IV hydralazine caused patient to have headache last, patient refusing to take again will add labetalol as needed instead  Hold off iron medication (ferrous sulfate) for now  Date: 6/20 Day 2:    Internal medicine: Neurology feels that pt had a TIA  MRI of the brain unremarkable for acute stroke  Toprol increased to 50 mg daily  Neuro checks  Continue ASA and statin  Echocardiogram severe concentric hypertrophy  Systolic function is normal  Wall motion is normal  Diastolic function is mildly abnormal  Continue holding iron medication  Continue monitoring blood pressures closely       Vital Signs:     Date/Time Temp Pulse Resp BP MAP (mmHg) SpO2 O2 Device   06/20/23 0741 98 2 °F (36 8 °C) 69 18 142/83 -- 93 % None (Room air)   06/19/23 2100 98 1 °F (36 7 °C) 68 16 152/104 Abnormal  -- 96 % None (Room air)   06/19/23 1900 98 2 °F (36 8 °C) 75 16 164/102 Abnormal  -- 97 % None (Room air)   06/19/23 1502 98 4 °F (36 9 °C) 73 18 162/105 Abnormal  -- 95 % None (Room air)   06/19/23 1108 -- 75 -- 165/105 Abnormal  -- 98 % --   06/19/23 0902 -- -- -- 163/87 -- -- --   06/19/23 0746 97 6 °F (36 4 °C) 65 -- -- -- 98 % --   06/19/23 0740 -- 75 -- 180/110 Abnormal  -- 95 % None (Room air)   06/19/23 0710 -- -- -- 191/119 Abnormal  149 -- --   06/19/23 06:01:55 -- -- -- 156/110 Abnormal  125 -- --   06/19/23 02:47:50 -- -- -- 146/103 Abnormal  117 -- --   06/19/23 00:28:03 -- -- -- 152/95 114 -- --   06/18/23 22:57:01 -- 82 20 133/94 107 96 % None (Room air)   06/18/23 21:45:29 -- 72 18 163/102 Abnormal  122 -- --   06/18/23 21:11:55 98 3 °F (36 8 °C) -- -- 184/114 Abnormal  137 -- --   06/18/23 1700 98 2 °F (36 8 °C) 71 -- 159/114 Abnormal  129 94 % None (Room air)       Pertinent Labs/Diagnostic Results:     6/18 Echo: Interpretation Summary       •  Left Ventricle: Left ventricular cavity size is normal  Wall thickness is moderately increased  There is severe concentric hypertrophy  Systolic function is normal  Wall motion is normal  Diastolic function is mildly abnormal, consistent with grade I (abnormal) relaxation  There is a prominent myocardial speckle pattern  •  Mitral Valve: There is mild regurgitation      In the absence of longstanding hypertension, consider infiltrative disease process  Results from last 7 days   Lab Units 06/20/23  0539 06/19/23  0541 06/18/23  0444 06/17/23  1918 06/17/23  1205   WBC Thousand/uL 3 07* 7 26 3 79*  --  5 46   HEMOGLOBIN g/dL 13 5 13 3 13 1  --  12 8   HEMATOCRIT % 43 7 42 2 41 0  --  40 8   PLATELETS Thousands/uL 181 205 183   < > 192    < > = values in this interval not displayed           Results from last 7 days   Lab Units 06/20/23  0539 06/19/23  0541 06/18/23  0444 06/17/23  1205   SODIUM mmol/L 140 130* 140 137   POTASSIUM mmol/L 4 3 3 6 3 3* 3 6   CHLORIDE mmol/L 108 99 106 104   CO2 mmol/L 26 24 29 28   ANION GAP mmol/L 6 7 5 5   BUN mg/dL 19 13 9 13   CREATININE mg/dL 0 88 0 61 0 70 0 91   EGFR ml/min/1 73sq m 79 109 104 75   CALCIUM mg/dL 9 0 8 9 8 9 8 8         Results from last 7 days   Lab Units 06/19/23  1123 06/19/23  0743 06/17/23  1157   POC GLUCOSE mg/dl 100 119 107     Results from last 7 days   Lab Units 06/20/23  0539 06/19/23  0541 06/18/23  0444 06/17/23  1205   GLUCOSE RANDOM mg/dL 86 130 105 99         Results from last 7 days   Lab Units 06/18/23  0444   HEMOGLOBIN A1C % 5 8*   EAG mg/dl 120         Results from last 7 days   Lab Units 06/17/23  1628 06/17/23  1414 06/17/23  1205   HS TNI 0HR ng/L  --   --  4   HS TNI 2HR ng/L  --  5  --    HSTNI D2 ng/L  --  1  --    HS TNI 4HR ng/L 5  --   --    HSTNI D4 ng/L 1  --   --          Results from last 7 days   Lab Units 06/17/23  1205   PROTIME seconds 14 0   INR  1 06   PTT seconds 30           Results from last 7 days   Lab Units 06/17/23  1205   FERRITIN ng/mL 11   IRON SATURATION % 15   IRON ug/dL 61   TIBC ug/dL 411         Medications:   Scheduled Medications:  aspirin, 81 mg, Oral, Daily  atorvastatin, 40 mg, Oral, QPM  enoxaparin, 40 mg, Subcutaneous, Daily  metoprolol succinate, 50 mg, Oral, Daily      Continuous IV Infusions:     PRN Meds:  acetaminophen, 650 mg, Oral, Q4H PRN  butalbital-acetaminophen-caffeine, 1 tablet, Oral, Q4H PRN  labetalol, 10 mg, Intravenous, Q6H PRN  ondansetron, 4 mg, Intravenous, Q6H PRN  senna, 1 tablet, Oral, HS PRN        Discharge Plan: [facility]    Network Utilization Review Department  ATTENTION: Please call with any questions or concerns to [phone] and carefully listen to the prompts so that you are directed to the right person  All voicemails are confidential   Marialuisa Patton all requests for admission clinical reviews, approved or denied determinations and any other requests to dedicated fax number below belonging to the campus where the patient is receiving treatment   List of dedicated fax numbers for the Facilities:  [address] DENIALS (Administrative/Medical Necessity) [phone]   [address] (Maternity/NICU/Pediatrics) [phone]   [address] Sara Sánchez [phone]   [facility] [phone]   [address] [address] [facility] [phone]   [address] Emily Cowan [facility] 134 [address] [facility] [phone]

## 2023-06-20 ENCOUNTER — TRANSITIONAL CARE MANAGEMENT (OUTPATIENT)
Dept: FAMILY MEDICINE CLINIC | Facility: CLINIC | Age: 48
End: 2023-06-20

## 2023-06-20 VITALS
DIASTOLIC BLOOD PRESSURE: 97 MMHG | HEIGHT: 61 IN | BODY MASS INDEX: 25.68 KG/M2 | WEIGHT: 136 LBS | OXYGEN SATURATION: 97 % | SYSTOLIC BLOOD PRESSURE: 142 MMHG | HEART RATE: 65 BPM | TEMPERATURE: 98.2 F | RESPIRATION RATE: 17 BRPM

## 2023-06-20 LAB
ANION GAP SERPL CALCULATED.3IONS-SCNC: 6 MMOL/L (ref 4–13)
BUN SERPL-MCNC: 19 MG/DL (ref 5–25)
CALCIUM SERPL-MCNC: 9 MG/DL (ref 8.4–10.2)
CHLORIDE SERPL-SCNC: 108 MMOL/L (ref 96–108)
CO2 SERPL-SCNC: 26 MMOL/L (ref 21–32)
CREAT SERPL-MCNC: 0.88 MG/DL (ref 0.6–1.3)
ERYTHROCYTE [DISTWIDTH] IN BLOOD BY AUTOMATED COUNT: 18.6 % (ref 11.6–15.1)
GFR SERPL CREATININE-BSD FRML MDRD: 79 ML/MIN/1.73SQ M
GLUCOSE SERPL-MCNC: 86 MG/DL (ref 65–140)
HCT VFR BLD AUTO: 43.7 % (ref 34.8–46.1)
HGB BLD-MCNC: 13.5 G/DL (ref 11.5–15.4)
MCH RBC QN AUTO: 24.3 PG (ref 26.8–34.3)
MCHC RBC AUTO-ENTMCNC: 30.9 G/DL (ref 31.4–37.4)
MCV RBC AUTO: 79 FL (ref 82–98)
PLATELET # BLD AUTO: 181 THOUSANDS/UL (ref 149–390)
PMV BLD AUTO: 10.4 FL (ref 8.9–12.7)
POTASSIUM SERPL-SCNC: 4.3 MMOL/L (ref 3.5–5.3)
RBC # BLD AUTO: 5.55 MILLION/UL (ref 3.81–5.12)
SODIUM SERPL-SCNC: 140 MMOL/L (ref 135–147)
WBC # BLD AUTO: 3.07 THOUSAND/UL (ref 4.31–10.16)

## 2023-06-20 PROCEDURE — 80048 BASIC METABOLIC PNL TOTAL CA: CPT

## 2023-06-20 PROCEDURE — 99239 HOSP IP/OBS DSCHRG MGMT >30: CPT | Performed by: NURSE PRACTITIONER

## 2023-06-20 PROCEDURE — 85027 COMPLETE CBC AUTOMATED: CPT

## 2023-06-20 RX ORDER — ASPIRIN 81 MG/1
81 TABLET, CHEWABLE ORAL DAILY
Qty: 30 TABLET | Refills: 0 | Status: SHIPPED | OUTPATIENT
Start: 2023-06-21 | End: 2023-07-21

## 2023-06-20 RX ORDER — ATORVASTATIN CALCIUM 40 MG/1
40 TABLET, FILM COATED ORAL EVERY EVENING
Qty: 30 TABLET | Refills: 0 | Status: SHIPPED | OUTPATIENT
Start: 2023-06-20 | End: 2023-07-20

## 2023-06-20 RX ORDER — METOPROLOL SUCCINATE 50 MG/1
50 TABLET, EXTENDED RELEASE ORAL DAILY
Qty: 30 TABLET | Refills: 0 | Status: SHIPPED | OUTPATIENT
Start: 2023-06-21 | End: 2023-06-22

## 2023-06-20 RX ADMIN — METOPROLOL SUCCINATE 50 MG: 50 TABLET, EXTENDED RELEASE ORAL at 08:27

## 2023-06-20 RX ADMIN — ENOXAPARIN SODIUM 40 MG: 40 INJECTION SUBCUTANEOUS at 08:27

## 2023-06-20 RX ADMIN — ASPIRIN 81 MG 81 MG: 81 TABLET ORAL at 08:27

## 2023-06-20 NOTE — ASSESSMENT & PLAN NOTE
· Now improved with 50 mg Toprol XL daily  · Patient's blood pressure is being lowered down gradually and not abruptly, due to possibility of TIA/CVA, and at the same time, there is possibility that patient has chronic severe hypertension for a long time, as patient's not on blood pressure medications anymore, due to perceived side effects  · Patient reported a history of  hypertension and was previously on blood pressure medication, however had some adverse reactions/intolerances  · Amlodipine caused a heat sensitivity adverse reaction with that medication    · Lisinopril made her bleed more with her menstruation and thus that medication was also discontinued  · Losartan gave her palpitations and thus this medication was also discontinued

## 2023-06-20 NOTE — ASSESSMENT & PLAN NOTE
· Patient came in with left hand weakness, dizziness and problems speaking, described as slow in in her speech  All of these symptoms had resolved  · Neurology consult, appreciate input  · Cleared for discharge with follow-up in 3-4 wks  · Continue aspirin and atorvastatin  · MRI of the brain unremarkable for acute stroke  · Echocardiogram severe concentric hypertrophy   Systolic function is normal  Wall motion is normal  Diastolic function is mildly abnormal  · A1c 5 8  · PT/OT  · Home with no needs

## 2023-06-20 NOTE — ASSESSMENT & PLAN NOTE
· Patient was recently diagnosed with iron deficiency by her primary care physician at [facility] practice  She was prescribed with an iron pill     · Hemoglobin is normal   · Iron panel normal  · Hold off iron medication (ferrous sulfate) for now

## 2023-06-20 NOTE — DISCHARGE SUMMARY
[facility]  Discharge- Memo Pu 1975, 56 y o  female MRN: [id number]  Unit/Bed#: S -Karen Encounter: [id number]  Primary Care Provider: Niko Knight MD   Date and time admitted to hospital: 6/17/2023 11:34 AM    * Stroke-like symptoms  Assessment & Plan  · Patient came in with left hand weakness, dizziness and problems speaking, described as slow in in her speech  All of these symptoms had resolved  · Neurology consult, appreciate input  · Cleared for discharge with follow-up in 3-4 wks  · Continue aspirin and atorvastatin  · MRI of the brain unremarkable for acute stroke  · Echocardiogram severe concentric hypertrophy  Systolic function is normal  Wall motion is normal  Diastolic function is mildly abnormal  · A1c 5 8  · PT/OT  · Home with no needs    Irregular menses  Assessment & Plan  · Patient told me, that she did not have any menstruation for about 3 months, but had menstruation recently  · Possibility the patient is at premenopausal stage now  · Pregnancy test negative   · Outpatient follow-up with primary care physician  History of iron deficiency  Assessment & Plan  · Patient was recently diagnosed with iron deficiency by her primary care physician at [facility]  She was prescribed with an iron pill  · Hemoglobin is normal   · Iron panel normal  · Hold off iron medication (ferrous sulfate) for now    Hypertension  Assessment & Plan  · Now improved with 50 mg Toprol XL daily  · Patient's blood pressure is being lowered down gradually and not abruptly, due to possibility of TIA/CVA, and at the same time, there is possibility that patient has chronic severe hypertension for a long time, as patient's not on blood pressure medications anymore, due to perceived side effects  · Patient reported a history of  hypertension and was previously on blood pressure medication, however had some adverse reactions/intolerances      · Amlodipine caused a heat sensitivity adverse reaction with that medication  · Lisinopril made her bleed more with her menstruation and thus that medication was also discontinued  · Losartan gave her palpitations and thus this medication was also discontinued    Medical Problems     Resolved Problems  Date Reviewed: 6/20/2023   None       Discharging Physician / Practitioner: LOLY Zazueta  PCP: Ronit Gordillo MD  Admission Date:   Admission Orders (From admission, onward)     Ordered        06/19/23 1409  Inpatient Admission  Once            06/17/23 1408  Place in Observation  Once                      Discharge Date: 06/20/23    Consultations During Hospital Stay:  [address] [facility]  IP CONSULT TO CASE MANAGEMENT  IP CONSULT TO NUTRITION SERVICES    Procedures Performed:   · None    Significant Findings / Test Results:   · CT Head (6/17/23):  No mass effect, acute intracranial hemorrhage or evidence of recent infarction  · CTA Head/Neck (6/17/23): No evidence for high-grade stenosis, focal occlusion or vascular aneurysm of the cervical or intracranial vessels  · MRI Brain (6/18/23): No mass effect, acute intracranial hemorrhage or evidence of recent infarction  Nonspecific scattered white matter FLAIR signal hyperintensity  Differential diagnosis should include demyelinating disease, early chronic microvascular ischemic change, sequelae of Lyme disease as well as vasculitis and other inflammatory etiologies  Incidental Findings:   · None    Test Results Pending at Discharge (will require follow up):    · None     Outpatient Tests Requested:  · Follow up with PCP within 1 wk    Complications:  None    Reason for Admission: Stroke-like symptoms    Hospital Course:   Geovanny Yao is a 56 y o  female patient past medical history of hypertension iron deficiency anemia who originally presented to the hospital on 6/17/2023 due to strokelike symptoms that began prior to coming into the hospital where she "became dizzy and had left hand weakness and problems speaking  Neurology was consulted and patient was placed on the stroke pathway  Blood pressure was quite elevated on admission but was cleared for normotension by neurology and patient was initiated on Toprol XL 25 mg initially with suboptimal control of her blood pressure  The dose was increased to 50 mg daily and her blood pressures did improve  Symptoms fully have resolved  Patient was cleared for discharge  Please see above list of diagnoses and related plan for additional information  Condition at Discharge: stable    Discharge Day Visit / Exam:   Subjective:  Patient resting in bed  Reports no complaints, no chest pain, shortness of breath, fever, or chills  Vitals: Blood Pressure: 142/83 (06/20/23 0741)  Pulse: 69 (06/20/23 0741)  Temperature: 98 2 °F (36 8 °C) (06/20/23 0741)  Temp Source: Oral (06/20/23 0741)  Respirations: 18 (06/20/23 0741)  Height: 5' 1\" (154 9 cm) (06/18/23 1530)  Weight - Scale: 61 7 kg (136 lb) (06/18/23 1530)  SpO2: 93 % (06/20/23 0741)     Exam:   Physical Exam  Vitals and nursing note reviewed  Constitutional:       General: She is not in acute distress  Appearance: She is normal weight  She is not ill-appearing  Cardiovascular:      Rate and Rhythm: Normal rate  Pulses: Normal pulses  Pulmonary:      Effort: Pulmonary effort is normal    Abdominal:      Palpations: Abdomen is soft  Musculoskeletal:         General: Normal range of motion  Skin:     General: Skin is warm and dry  Neurological:      Mental Status: She is alert and oriented to person, place, and time  Psychiatric:         Mood and Affect: Mood normal         Discussion with Family: Patient declined call to   Discharge instructions/Information to patient and family:   See after visit summary for information provided to patient and family        Provisions for Follow-Up Care:  See after visit summary for " information related to follow-up care and any pertinent home health orders  Disposition:   Home    Planned Readmission: None     Discharge Statement:  I spent 35 minutes discharging the patient  This time was spent on the day of discharge  I had direct contact with the patient on the day of discharge  Greater than 50% of the total time was spent examining patient, answering all patient questions, arranging and discussing plan of care with patient as well as directly providing post-discharge instructions  Additional time then spent on discharge activities  Discharge Medications:  See after visit summary for reconciled discharge medications provided to patient and/or family        **Please Note: This note may have been constructed using a voice recognition system**

## 2023-06-20 NOTE — PLAN OF CARE
Problem: Neurological Deficit  Goal: Neurological status is stable or improving  Description: Interventions:  - Monitor and assess patient's level of consciousness, motor function, sensory function, and level of assistance needed for ADLs  - Monitor and report changes from baseline  Collaborate with interdisciplinary team to initiate plan and implement interventions as ordered  - Provide and maintain a safe environment  - Consider seizure precautions  - Consider fall precautions  - Consider aspiration precautions  - Consider bleeding precautions  Outcome: Adequate for Discharge     Problem: Activity Intolerance/Impaired Mobility  Goal: Mobility/activity is maintained at optimum level for patient  Description: Interventions:  - Assess and monitor patient  barriers to mobility and need for assistive/adaptive devices  - Assess patient's emotional response to limitations  - Collaborate with interdisciplinary team and initiate plans and interventions as ordered  - Encourage independent activity per ability   - Maintain proper body alignment  - Perform active/passive rom as tolerated/ordered  - Plan activities to conserve energy   - Turn patient as appropriate  Outcome: Adequate for Discharge     Problem: Communication Impairment  Goal: Ability to express needs and understand communication  Description: Assess patient's communication skills and ability to understand information  Patient will demonstrate use of effective communication techniques, alternative methods of communication and understanding even if not able to speak  - Encourage communication and provide alternate methods of communication as needed  - Collaborate with case management/ for discharge needs  - Include patient/family/caregiver in decisions related to communication    Outcome: Adequate for Discharge     Problem: Nutrition  Goal: Nutrition/Hydration status is improving  Description: Monitor and assess patient's nutrition/hydration status for malnutrition (ex- brittle hair, bruises, dry skin, pale skin and conjunctiva, muscle wasting, smooth red tongue, and disorientation)  Collaborate with interdisciplinary team and initiate plan and interventions as ordered  Monitor patient's weight and dietary intake as ordered or per policy  Utilize nutrition screening tool and intervene per policy  Determine patient's food preferences and provide high-protein, high-caloric foods as appropriate  - Assist patient with eating   - Allow adequate time for meals   - Encourage patient to take dietary supplement as ordered  - Collaborate with clinical nutritionist   - Include patient/family/caregiver in decisions related to nutrition  Outcome: Adequate for Discharge     Problem: NEUROSENSORY - ADULT  Goal: Achieves stable or improved neurological status  Description: INTERVENTIONS  - Monitor and report changes in neurological status  - Monitor vital signs such as temperature, blood pressure, glucose, and any other labs ordered   - Initiate measures to prevent increased intracranial pressure  - Monitor for seizure activity and implement precautions if appropriate      Outcome: Adequate for Discharge  Goal: Achieves maximal functionality and self care  Description: INTERVENTIONS  - Monitor swallowing and airway patency with patient fatigue and changes in neurological status  - Encourage and assist patient to increase activity and self care     - Encourage visually impaired, hearing impaired and aphasic patients to use assistive/communication devices  Outcome: Adequate for Discharge     Problem: CARDIOVASCULAR - ADULT  Goal: Maintains optimal cardiac output and hemodynamic stability  Description: INTERVENTIONS:  - Monitor I/O, vital signs and rhythm  - Monitor for S/S and trends of decreased cardiac output  - Administer and titrate ordered vasoactive medications to optimize hemodynamic stability  - Assess quality of pulses, skin color and temperature  - Assess for signs of decreased coronary artery perfusion  - Instruct patient to report change in severity of symptoms  Outcome: Adequate for Discharge  Goal: Absence of cardiac dysrhythmias or at baseline rhythm  Description: INTERVENTIONS:  - Continuous cardiac monitoring, vital signs, obtain 12 lead EKG if ordered  - Administer antiarrhythmic and heart rate control medications as ordered  - Monitor electrolytes and administer replacement therapy as ordered  Outcome: Adequate for Discharge

## 2023-06-20 NOTE — APP STUDENT NOTE
ROSE STUDENT  Inpatient Progress Note for TRAINING ONLY  Not Part of Legal Medical Record       Progress Note - Johnny Rodrigues 56 y o  female MRN: [id number]    Unit/Bed#: S -01 Encounter: [id number]      Assessment/Plan:  Stroke-like symptoms  - Pt presented with LUE weakness, dizziness, and slowed speech  Symptoms resolved prior to arrival in ED  - CTH/CTA: negative for acute blood, signs of ischemia, LVO or critical stenosis   - MRI: chronic microvascular angiopathy but no evidence of acute stroke  - ECHO- concentric hypertrophy with normal systolic function and mild diastolic dysfunction  - Appreciate neurology input- will follow up in 3-4 weeks after d/c today  - Continue ASA, Atorvastatin, and Toprol-XL 50mg at home  - PT/OT/Speech not indicated due to patient at baseline status    Hypertension  - BP now acceptable with Toprol XL 50mg daily  - BP was lowered slowly over course of admission due to possibility of CVA/TIA  • Patient reported a history of  hypertension and was previously on blood pressure medication, however had some adverse reactions/intolerances  ? Amlodipine caused a heat sensitivity adverse reaction with that medication  ? Lisinopril made her bleed more with her menstruation and thus that medication was also discontinued  ? Losartan gave her palpitations and thus this medication was also discontinued    History of iron deficiency  - Pt recently diagnosed with iron deficiency  - Hgb and Iron panel normal during admission  - Hold iron medication for now      Subjective:   Johnny Rodrigues is a 56 y o  female with PMH HTN and iron deficiency who is HD3 for stoke like symptoms  PT presented to the ED with dizziness, LUE weakness, and slowed speech  All symptoms resolved prior to arrival  Pt reports feeling well this morning with no problems overnight  Pt denies any headaches, dizziness, or weakness at this time      Objective:     Vitals: Blood pressure 142/83, pulse 69, temperature 98 2 °F "(36 8 °C), temperature source Oral, resp  rate 18, height 5' 1\" (1 549 m), weight 61 7 kg (136 lb), SpO2 93 %  ,Body mass index is 25 7 kg/m²  No intake or output data in the 24 hours ending 06/20/23 1030    Physical Exam:   Physical Exam  Vitals and nursing note reviewed  Constitutional:       General: She is not in acute distress  Appearance: Normal appearance  She is not ill-appearing or diaphoretic  HENT:      Head: Normocephalic and atraumatic  Mouth/Throat:      Mouth: Mucous membranes are moist    Eyes:      Conjunctiva/sclera: Conjunctivae normal    Cardiovascular:      Rate and Rhythm: Normal rate and regular rhythm  Heart sounds: Normal heart sounds  Pulmonary:      Effort: No respiratory distress  Breath sounds: Normal breath sounds  No wheezing, rhonchi or rales  Chest:      Chest wall: No tenderness  Abdominal:      Palpations: Abdomen is soft  Tenderness: There is no abdominal tenderness  Musculoskeletal:      Cervical back: Normal range of motion  Right lower leg: No edema  Left lower leg: No edema  Skin:     General: Skin is warm and dry  Neurological:      General: No focal deficit present  Mental Status: She is alert and oriented to person, place, and time  Motor: No weakness  Psychiatric:         Mood and Affect: Mood normal            Invasive Devices     Peripheral Intravenous Line  Duration           Peripheral IV 06/17/23 Right Antecubital 2 days                Lab, Imaging and other studies: I have personally reviewed pertinent reports      VTE Pharmacologic Prophylaxis: Enoxaparin (Lovenox)    "

## 2023-06-21 NOTE — UTILIZATION REVIEW
NOTIFICATION OF ADMISSION DISCHARGE   This is a Notification of Discharge from [address]  Please be advised that this patient has been discharge from our facility  Below you will find the admission and discharge date and time including the patient’s disposition  UTILIZATION REVIEW CONTACT:  Noel Shah  Utilization   Network Utilization Review Department  Phone: [phone] x carefully listen to the prompts  All voicemails are confidential   Email: [email or web address]     ADMISSION INFORMATION  PRESENTATION DATE: 6/17/2023 11:34 AM  OBERVATION ADMISSION DATE:   INPATIENT ADMISSION DATE: 6/19/23  2:10 PM   DISCHARGE DATE: 6/20/2023  1:00 PM   DISPOSITION:Home/Self Care    IMPORTANT INFORMATION:  Send all requests for admission clinical reviews, approved or denied determinations and any other requests to dedicated fax number below belonging to the campus where the patient is receiving treatment   List of dedicated fax numbers:  [address] DENIALS (Administrative/Medical Necessity) [phone]   [address] (Maternity/NICU/Pediatrics) [phone]   [place] [facility] [phone]   [place] [address] [phone]   [address] [phone]   220 [facility] [phone] 90 [facility] [phone]   [address] [place], [place] [address] [address] [phone]   [facility]  [phone]   [facility] [phone]   412 [facility] [address] [facility] [phone]

## 2023-06-22 ENCOUNTER — OFFICE VISIT (OUTPATIENT)
Dept: FAMILY MEDICINE CLINIC | Facility: CLINIC | Age: 48
End: 2023-06-22
Payer: COMMERCIAL

## 2023-06-22 ENCOUNTER — TRANSITIONAL CARE MANAGEMENT (OUTPATIENT)
Dept: FAMILY MEDICINE CLINIC | Facility: CLINIC | Age: 48
End: 2023-06-22

## 2023-06-22 VITALS
HEIGHT: 61 IN | SYSTOLIC BLOOD PRESSURE: 148 MMHG | HEART RATE: 71 BPM | WEIGHT: 129.5 LBS | TEMPERATURE: 97.8 F | DIASTOLIC BLOOD PRESSURE: 90 MMHG | BODY MASS INDEX: 24.45 KG/M2 | OXYGEN SATURATION: 99 %

## 2023-06-22 DIAGNOSIS — E61.1 IRON DEFICIENCY: Chronic | ICD-10-CM

## 2023-06-22 DIAGNOSIS — Z83.2 FAMILY HISTORY OF THALASSEMIA: ICD-10-CM

## 2023-06-22 DIAGNOSIS — R29.90 STROKE-LIKE SYMPTOMS: Primary | ICD-10-CM

## 2023-06-22 DIAGNOSIS — I10 PRIMARY HYPERTENSION: ICD-10-CM

## 2023-06-22 PROCEDURE — 99496 TRANSJ CARE MGMT HIGH F2F 7D: CPT | Performed by: FAMILY MEDICINE

## 2023-06-22 PROCEDURE — 1111F DSCHRG MED/CURRENT MED MERGE: CPT | Performed by: FAMILY MEDICINE

## 2023-06-22 RX ORDER — HYDROCHLOROTHIAZIDE 25 MG/1
25 TABLET ORAL DAILY
Qty: 30 TABLET | Refills: 1 | Status: SHIPPED | OUTPATIENT
Start: 2023-06-22 | End: 2023-06-22

## 2023-06-22 RX ORDER — SPIRONOLACTONE 25 MG/1
25 TABLET ORAL DAILY
Qty: 30 TABLET | Refills: 0 | Status: SHIPPED | OUTPATIENT
Start: 2023-06-22

## 2023-06-22 RX ORDER — METOPROLOL SUCCINATE 50 MG/1
50 TABLET, EXTENDED RELEASE ORAL 2 TIMES DAILY
Qty: 60 TABLET | Refills: 0 | Status: SHIPPED | OUTPATIENT
Start: 2023-06-22 | End: 2023-07-22

## 2023-06-22 RX ORDER — LOSARTAN POTASSIUM 50 MG/1
50 TABLET ORAL DAILY
Qty: 30 TABLET | Refills: 2 | Status: SHIPPED | OUTPATIENT
Start: 2023-06-22 | End: 2023-06-22

## 2023-06-22 NOTE — PROGRESS NOTES
Name: Grant Schaefer      : 1975      MRN: [id number]  Encounter Provider: Nayla Ziegler MD  Encounter Date: 2023   Encounter department: [address] [place] [address]     1  Stroke-like symptoms  -     Ambulatory Referral to Neurology; Future    2  Primary hypertension  -     metoprolol succinate (TOPROL-XL) 50 mg 24 hr tablet; Take 1 tablet (50 mg total) by mouth 2 (two) times a day  -     spironolactone (ALDACTONE) 25 mg tablet; Take 1 tablet (25 mg total) by mouth daily    3  Family history of thalassemia    4  History of iron deficiency      TCM visit today  Remains on atorvastatin and baby aspirin  Blood pressure remains elevated  Per neurology notes in the hospital she needs a 4-week follow-up  Patient unable to take losartan due to palpitations  Unable to take amlodipine due to flushing     We will change metoprolol to 50 mg twice daily  We will add spironolactone 25 mg in the morning  She can stop the potassium pills  If this is unable to control her blood pressure may need to consider referral to cardiology  Patient understands and agrees with this plan  We will see her back in 1 month         Subjective      TCM Call     Date and time call was made  2023  2:24 PM    Hospital care reviewed  Records reviewed    Patient was hospitialized at  [address] [place] [address]    Date of Admission  23    Date of discharge  23    Diagnosis  Stroke-like symptoms    Disposition  Home    Were the patients medications reviewed and updated  Yes    Current Symptoms  None      TCM Call     Post hospital issues  None    Should patient be enrolled in anticoag monitoring? No    Scheduled for follow up?   Yes    Did you obtain your prescribed medications  Yes    Do you need help managing your prescriptions or medications  No    Is transportation to your appointment needed  No    I have advised the patient to call PCP with any new or worsening symptoms    [place], [place] Living Arrangements  Family members    Support System  Family    The type of support provided  Emotional; Physical    Do you have social support  Yes, as much as I need    Are you recieving any outpatient services  No    Are you recieving home care services  No    Are you using any community resources  No    Current waiver services  No    Have you fallen in the last 12 months  No    Interperter language line needed  No    Counseling  Patient    Counseling topics  Prognosis; Activities of daily living      At work dizzy and numbness and weakness on her left side  She was taken to the hospital in an ambulance  Review of Systems   Constitutional: Negative for fatigue and fever  HENT: Negative for sore throat  Eyes: Negative for visual disturbance  Respiratory: Negative for cough, chest tightness and shortness of breath  Cardiovascular: Negative for chest pain, palpitations and leg swelling  Gastrointestinal: Negative for abdominal pain, constipation, diarrhea and nausea  Endocrine: Negative for cold intolerance and heat intolerance  Genitourinary: Negative for flank pain  Musculoskeletal: Negative for back pain and neck pain  Skin: Negative for rash  Neurological: Negative for headaches  Psychiatric/Behavioral: Negative for behavioral problems and confusion  Current Outpatient Medications on File Prior to Visit   Medication Sig   • aspirin 81 mg chewable tablet Chew 1 tablet (81 mg total) daily Do not start before June 21, 2023  • atorvastatin (LIPITOR) 40 mg tablet Take 1 tablet (40 mg total) by mouth every evening   • [DISCONTINUED] metoprolol succinate (TOPROL-XL) 50 mg 24 hr tablet Take 1 tablet (50 mg total) by mouth daily Do not start before June 21, 2023     • [DISCONTINUED] potassium chloride (K-DUR,KLOR-CON) 20 mEq tablet Take 1 tablet (20 mEq total) by mouth daily   • Ascorbic Acid (vitamin C) 1000 MG tablet Take 1 tablet (1,000 mg total) by mouth daily   • Blood Pressure "Monitoring (Blood Pressure Cuff) MISC Use in the morning (Patient not taking: Reported on 6/22/2023)   • ferrous sulfate 324 (65 Fe) mg Take 1 tablet (324 mg total) by mouth in the morning for 90 doses (Patient not taking: Reported on 6/22/2023)   • methocarbamol (ROBAXIN) 500 mg tablet Take 1 tablet (500 mg total) by mouth daily at bedtime as needed for muscle spasms (Patient not taking: Reported on 6/22/2023)       Objective     /90 (BP Location: Left arm, Patient Position: Sitting, Cuff Size: Standard)   Pulse 71   Temp 97 8 °F (36 6 °C)   Ht 5' 1\" (1 549 m)   Wt 58 7 kg (129 lb 8 oz)   SpO2 99%   BMI 24 47 kg/m²     Physical Exam  Vitals and nursing note reviewed  Constitutional:       Appearance: She is well-developed  HENT:      Head: Normocephalic and atraumatic  Cardiovascular:      Rate and Rhythm: Normal rate and regular rhythm  Pulmonary:      Effort: Pulmonary effort is normal       Breath sounds: Normal breath sounds  Neurological:      General: No focal deficit present  Mental Status: She is alert     Psychiatric:         Mood and Affect: Mood normal          Behavior: Behavior normal        Jayashree Reyes MD  "

## 2023-07-20 DIAGNOSIS — R29.898 LEFT ARM WEAKNESS: ICD-10-CM

## 2023-07-21 RX ORDER — ASPIRIN 81 MG/1
81 TABLET, CHEWABLE ORAL DAILY
Qty: 30 TABLET | Refills: 0 | Status: SHIPPED | OUTPATIENT
Start: 2023-07-21 | End: 2023-08-20

## 2023-07-23 DIAGNOSIS — I10 PRIMARY HYPERTENSION: ICD-10-CM

## 2023-07-24 RX ORDER — SPIRONOLACTONE 25 MG/1
25 TABLET ORAL DAILY
Qty: 30 TABLET | Refills: 0 | OUTPATIENT
Start: 2023-07-24

## 2023-07-24 RX ORDER — METOPROLOL SUCCINATE 50 MG/1
50 TABLET, EXTENDED RELEASE ORAL 2 TIMES DAILY
Qty: 60 TABLET | Refills: 0 | OUTPATIENT
Start: 2023-07-24

## 2023-07-25 ENCOUNTER — TELEPHONE (OUTPATIENT)
Dept: NEUROLOGY | Facility: CLINIC | Age: 48
End: 2023-07-25

## 2023-07-25 DIAGNOSIS — I10 PRIMARY HYPERTENSION: ICD-10-CM

## 2023-07-25 RX ORDER — SPIRONOLACTONE 25 MG/1
25 TABLET ORAL DAILY
Qty: 30 TABLET | Refills: 0 | Status: SHIPPED | OUTPATIENT
Start: 2023-07-25 | End: 2023-08-04

## 2023-07-25 NOTE — TELEPHONE ENCOUNTER
1ST ATTEMPT - Called patient and LVM to call back to schedule HFU appt. Left our number. Sending Im[employer] message as well - last login 7/17/23. HFU/SLAN 6/17-6/20/STROKE-LIKE SYMPTOMS      NOTES:  Will recommend outpatient neurology follow-up in 3 to 4 weeks.

## 2023-08-04 ENCOUNTER — OFFICE VISIT (OUTPATIENT)
Dept: FAMILY MEDICINE CLINIC | Facility: CLINIC | Age: 48
End: 2023-08-04
Payer: COMMERCIAL

## 2023-08-04 VITALS
TEMPERATURE: 97.6 F | DIASTOLIC BLOOD PRESSURE: 98 MMHG | HEIGHT: 59 IN | SYSTOLIC BLOOD PRESSURE: 144 MMHG | WEIGHT: 132 LBS | HEART RATE: 64 BPM | BODY MASS INDEX: 26.61 KG/M2 | OXYGEN SATURATION: 99 %

## 2023-08-04 DIAGNOSIS — G56.02 CARPAL TUNNEL SYNDROME OF LEFT WRIST: ICD-10-CM

## 2023-08-04 DIAGNOSIS — M79.605 LEFT LEG PAIN: Primary | ICD-10-CM

## 2023-08-04 DIAGNOSIS — I10 PRIMARY HYPERTENSION: ICD-10-CM

## 2023-08-04 PROCEDURE — 99214 OFFICE O/P EST MOD 30 MIN: CPT | Performed by: FAMILY MEDICINE

## 2023-08-04 RX ORDER — SPIRONOLACTONE 50 MG/1
50 TABLET, FILM COATED ORAL DAILY
Qty: 90 TABLET | Refills: 1 | Status: SHIPPED | OUTPATIENT
Start: 2023-08-04

## 2023-08-04 NOTE — PROGRESS NOTES
Name: Oren Michael      : 1975      MRN: [id number]  Encounter Provider: Ivana Faulkner MD  Encounter Date: 2023   Encounter department: [address] [place] [address]     1. Left leg pain  -     VAS lower limb venous duplex study, unilateral/limited; Future; Expected date: 2023    2. Carpal tunnel syndrome of left wrist    3. Primary hypertension  -     spironolactone (ALDACTONE) 50 mg tablet; Take 1 tablet (50 mg total) by mouth daily  -     Blood Pressure Monitoring (Blood Pressure Monitor Automat) ENOC; Use 2 (two) times a day      Leg pain started to foot and proceeded to the back of her knee and now is at the top of her left thigh. She did have a history of a TIA. We will order a lower limb venous duplex study to rule out DVT. Carpal tunnel of the left wrist patient would like to avoid surgery at this time. Continue with night splinting and Tylenol as needed. Blood pressure remains elevated today in office. We will can increase her spironolactone to 50 mg daily from 25. Continue to monitor blood pressure at home       Subjective      Today for follow-up. She does endorse ongoing leg pain. She feels the pain is traveling up her leg. Initially started in her foot then proceeded to her knee and is now on her mid thigh. She is concerned about a DVT since she had a TIA this past spring. She has not been monitoring her blood pressures at home. Endorses left hand numbness and tingling in the first 3 digits. Has used splinting at night in the past.  Not currently wearing. Would like to avoid surgery. Review of Systems   Constitutional: Negative for fatigue and fever. HENT: Negative for sore throat. Eyes: Negative for visual disturbance. Respiratory: Negative for cough, chest tightness and shortness of breath. Cardiovascular: Negative for chest pain, palpitations and leg swelling.    Gastrointestinal: Negative for abdominal pain, constipation, diarrhea and nausea. Endocrine: Negative for cold intolerance and heat intolerance. Genitourinary: Negative for flank pain. Musculoskeletal: Negative for back pain and neck pain. Left leg pain   Skin: Negative for rash. Neurological: Negative for headaches. Numbness and tingling left hand digits 1-3   Psychiatric/Behavioral: Negative for behavioral problems and confusion. Current Outpatient Medications on File Prior to Visit   Medication Sig   • aspirin 81 mg chewable tablet Chew 1 tablet (81 mg total) daily   • metoprolol succinate (TOPROL-XL) 50 mg 24 hr tablet Take 1 tablet (50 mg total) by mouth 2 (two) times a day   • [DISCONTINUED] spironolactone (ALDACTONE) 25 mg tablet Take 1 tablet (25 mg total) by mouth daily   • Ascorbic Acid (vitamin C) 1000 MG tablet Take 1 tablet (1,000 mg total) by mouth daily (Patient not taking: Reported on 8/4/2023)   • atorvastatin (LIPITOR) 40 mg tablet Take 1 tablet (40 mg total) by mouth every evening (Patient not taking: Reported on 8/4/2023)   • ferrous sulfate 324 (65 Fe) mg Take 1 tablet (324 mg total) by mouth in the morning for 90 doses   • methocarbamol (ROBAXIN) 500 mg tablet Take 1 tablet (500 mg total) by mouth daily at bedtime as needed for muscle spasms (Patient not taking: Reported on 6/22/2023)   • [DISCONTINUED] Blood Pressure Monitoring (Blood Pressure Cuff) MISC Use in the morning (Patient not taking: Reported on 6/22/2023)       Objective     /98 (BP Location: Left arm, Patient Position: Sitting, Cuff Size: Large)   Pulse 64   Temp 97.6 °F (36.4 °C)   Ht 4' 11" (1.499 m)   Wt 59.9 kg (132 lb)   SpO2 99%   BMI 26.66 kg/m²     Physical Exam  Vitals and nursing note reviewed. Constitutional:       Appearance: She is well-developed. HENT:      Head: Normocephalic and atraumatic. Cardiovascular:      Rate and Rhythm: Normal rate and regular rhythm. Pulses: Normal pulses.    Pulmonary:      Effort: Pulmonary effort is normal.      Breath sounds: Normal breath sounds. Musculoskeletal:         General: Tenderness present. No swelling. Comments: Positive Phalen   Neurological:      General: No focal deficit present. Mental Status: She is alert.    Psychiatric:         Mood and Affect: Mood normal.         Behavior: Behavior normal.       Debby Mcduffie MD

## 2023-08-11 ENCOUNTER — HOSPITAL ENCOUNTER (OUTPATIENT)
Dept: NON INVASIVE DIAGNOSTICS | Facility: HOSPITAL | Age: 48
Discharge: HOME | End: 2023-08-11
Attending: FAMILY MEDICINE
Payer: COMMERCIAL

## 2023-08-11 DIAGNOSIS — I10 PRIMARY HYPERTENSION: ICD-10-CM

## 2023-08-11 DIAGNOSIS — M79.605 LEFT LEG PAIN: ICD-10-CM

## 2023-08-11 PROCEDURE — 93971 EXTREMITY STUDY: CPT | Performed by: SURGERY

## 2023-08-11 PROCEDURE — 93971 EXTREMITY STUDY: CPT

## 2023-08-11 RX ORDER — METOPROLOL SUCCINATE 50 MG/1
50 TABLET, EXTENDED RELEASE ORAL 2 TIMES DAILY
Qty: 60 TABLET | Refills: 0 | OUTPATIENT
Start: 2023-08-11

## 2023-08-17 DIAGNOSIS — I10 PRIMARY HYPERTENSION: ICD-10-CM

## 2023-08-17 DIAGNOSIS — R29.898 LEFT ARM WEAKNESS: ICD-10-CM

## 2023-08-17 RX ORDER — ASPIRIN 81 MG/1
81 TABLET, CHEWABLE ORAL DAILY
Qty: 30 TABLET | Refills: 0 | Status: SHIPPED | OUTPATIENT
Start: 2023-08-17 | End: 2023-09-16

## 2023-08-17 RX ORDER — METOPROLOL SUCCINATE 50 MG/1
50 TABLET, EXTENDED RELEASE ORAL 2 TIMES DAILY
Qty: 60 TABLET | Refills: 0 | Status: SHIPPED | OUTPATIENT
Start: 2023-08-17 | End: 2023-09-16

## 2023-08-28 ENCOUNTER — TELEPHONE (OUTPATIENT)
Dept: NEUROLOGY | Facility: CLINIC | Age: 48
End: 2023-08-28

## 2023-08-29 ENCOUNTER — OFFICE VISIT (OUTPATIENT)
Dept: NEUROLOGY | Facility: CLINIC | Age: 48
End: 2023-08-29
Payer: COMMERCIAL

## 2023-08-29 VITALS
HEIGHT: 59 IN | DIASTOLIC BLOOD PRESSURE: 96 MMHG | SYSTOLIC BLOOD PRESSURE: 140 MMHG | OXYGEN SATURATION: 98 % | HEART RATE: 77 BPM | WEIGHT: 134 LBS | BODY MASS INDEX: 27.01 KG/M2

## 2023-08-29 DIAGNOSIS — I10 HYPERTENSION: ICD-10-CM

## 2023-08-29 DIAGNOSIS — M54.2 CERVICALGIA: Primary | ICD-10-CM

## 2023-08-29 DIAGNOSIS — R29.898 LEFT ARM WEAKNESS: ICD-10-CM

## 2023-08-29 DIAGNOSIS — R29.90 STROKE-LIKE SYMPTOMS: ICD-10-CM

## 2023-08-29 PROCEDURE — 99214 OFFICE O/P EST MOD 30 MIN: CPT | Performed by: PSYCHIATRY & NEUROLOGY

## 2023-08-29 RX ORDER — TIZANIDINE 2 MG/1
2 TABLET ORAL
Qty: 30 TABLET | Refills: 4 | Status: SHIPPED | OUTPATIENT
Start: 2023-08-29

## 2023-08-29 RX ORDER — ATORVASTATIN CALCIUM 40 MG/1
40 TABLET, FILM COATED ORAL EVERY EVENING
Qty: 30 TABLET | Refills: 4 | Status: SHIPPED | OUTPATIENT
Start: 2023-08-29

## 2023-08-29 NOTE — PATIENT INSTRUCTIONS
Please continue Aspirin 81mg daily and Atorvastatin 40mg daily    Please start taking Tizanidine 2mg at bedtime daily    Have some heat back for your neck and do some stretching. If you need Physical therapy, please ask your family doctor.      Referral to Cardiology for blood pressure control    Follow up with Neurology in about 4 months

## 2023-08-29 NOTE — ASSESSMENT & PLAN NOTE
53 y.o. F w/ HTN, headache, neck pain w/ LUE sensory sx is here for hospital follow-up. She also reports having cervicalgia. Plan  -Discussed plan with neurology attending, Dr. Arun Aguilar  -Patient reports neck tightness and tenderness on palpation of her occipital region and some pain in her left temporal region likely cervicalgia. We will trial tizanidine 2 mg daily at nighttime  -Continue taking aspirin 81 mg daily and atorvastatin 40 mg daily (she ran out of atorvastatin refill previously. Ordered more refill for her and recommended her to follow-up with PCP)  -Follow-up with PCP for medical management  -Follow-up with cardiology for blood pressure control recommend normotension (<130/<80)  -Recommend neck muscle stretching and exercises.   Recommend PT as well however patient reports that PT did not help previously and prefers to do stretching and exercises at home    Follow-up in about 4 months

## 2023-08-29 NOTE — ASSESSMENT & PLAN NOTE
Patient is here for hospital follow-up    She presented [facility] on 6/17/2023 due to acute-onset dizziness (presyncopal description), LUE weakness and left facial weakness. Stroke alert was called. Initial /111. NIHSS 3 (1 left facial droop, 2 motor left arm) then resolved to NIHSS 0. Not a candidate for TNK due to symptoms resolved. MRI brain w/o did not show acute stroke. Echo showed LVEF 65%. G1DD. Concentric hypertrophy. normal L atrium. , A1C 5.8. Patient likely has hypertensive emergency and TIA. She is to continue Aspirin 81m daily and Atorvastatin 40mg daily.     Plan as above

## 2023-08-29 NOTE — PROGRESS NOTES
Patient ID: Tatiana Rodrigues is a 53 y.o. female. Assessment/Plan:    Cervicalgia  53 y.o. F w/ HTN, headache, neck pain w/ LUE sensory sx is here for hospital follow-up. She also reports having cervicalgia. Plan  -Discussed plan with neurology attending, Dr. Paredes Case  -Patient reports neck tightness and tenderness on palpation of her occipital region and some pain in her left temporal region likely cervicalgia. We will trial tizanidine 2 mg daily at nighttime  -Continue taking aspirin 81 mg daily and atorvastatin 40 mg daily (she ran out of atorvastatin refill previously. Ordered more refill for her and recommended her to follow-up with PCP)  -Follow-up with PCP for medical management  -Follow-up with cardiology for blood pressure control recommend normotension (<130/<80)  -Recommend neck muscle stretching and exercises. Recommend PT as well however patient reports that PT did not help previously and prefers to do stretching and exercises at home    Follow-up in about 4 months      Left arm weakness  No weakness in her left arm currently. Stroke-like symptoms  Patient is here for hospital follow-up    She presented [facility] on 6/17/2023 due to acute-onset dizziness (presyncopal description), LUE weakness and left facial weakness. Stroke alert was called. Initial /111. NIHSS 3 (1 left facial droop, 2 motor left arm) then resolved to NIHSS 0. Not a candidate for TNK due to symptoms resolved. MRI brain w/o did not show acute stroke. Echo showed LVEF 65%. G1DD. Concentric hypertrophy. normal L atrium. , A1C 5.8. Patient likely has hypertensive emergency and TIA. She is to continue Aspirin 81m daily and Atorvastatin 40mg daily. Plan as above    Hypertension  Patient used to follow-up with Dr. Semaj Kumar cardiologist however she lost to follow-up.     New referral to cardiology is given for blood pressure management    Currently patient is taking metoprolol succinate 50 mg twice daily and spironolactone 50 mg daily       Diagnoses and all orders for this visit:    Cervicalgia  -     tiZANidine (ZANAFLEX) 2 mg tablet; Take 1 tablet (2 mg total) by mouth daily at bedtime    Left arm weakness  -     atorvastatin (LIPITOR) 40 mg tablet; Take 1 tablet (40 mg total) by mouth every evening    Hypertension  -     Ambulatory Referral to Cardiology; Future    Stroke-like symptoms       Subjective:    53 y.o. F w/ HTN, headache, neck pain w/ LUE sensory sx is here for HFU. She presented [facility] on 6/17/2023 due to acute-onset dizziness (presyncopal description), LUE weakness and left facial weakness. Stroke alert was called. Initial /111. NIHSS 3 (1 left facial droop, 2 motor left arm) then resolved to NIHSS 0. Not a candidate for TNK due to symptoms resolved. MRI brain w/o did not show acute stroke. Echo showed LVEF 65%. G1DD. Concentric hypertrophy. normal L atrium. , A1C 5.8. Patient likely has hypertensive emergency and TIA. She is to continue Aspirin 81m daily and Atorvastatin 40mg daily. Today, patient reports not taking Atorvastatin. She has some neck muscle tightness and some pain in her occipital region and pain in the left temporal region. She also feels tired in her upper extremities bilaterally. 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 She does not have any more episodes that prompted her to the [facility] ED. The following portions of the patient's history were reviewed and updated as appropriate: She  has a past medical history of Allergic conjunctivitis, Chest wall contusion, Cyst of right breast, Fibroadenoma of right breast, Hypertension, Hypertension, Iron deficiency, and Syphilis.   She   Patient Active Problem List    Diagnosis Date Noted   • Left arm weakness 08/29/2023   • Stroke-like symptoms 06/17/2023   • Hypertension 06/17/2023   • History of iron deficiency 06/17/2023   • Irregular menses 06/17/2023   • Iron deficiency anemia secondary to inadequate dietary iron intake 2023   • Cervical radiculopathy 2023   • Cervicalgia 2023   • Seasonal allergies 2023   • Pica 2023   • Acute non intractable tension-type headache 2022   • Carpal tunnel syndrome of left wrist 2022   • Neutropenia ([address]) 2020   • Hypoferremia 2020   • Family history of thalassemia 2019   • Primary hypertension 2019   • Hypokalemia 2018   • Pure hypercholesterolemia 2018     She  has a past surgical history that includes  section (); Tubal ligation (); and Breast biopsy (Right). Her family history includes Hypertension in her father. She  reports that she has never smoked. She has never used smokeless tobacco. She reports that she does not drink alcohol and does not use drugs.   Current Outpatient Medications   Medication Sig Dispense Refill   • aspirin 81 mg chewable tablet Chew 1 tablet (81 mg total) daily 30 tablet 0   • atorvastatin (LIPITOR) 40 mg tablet Take 1 tablet (40 mg total) by mouth every evening 30 tablet 4   • Blood Pressure Monitoring (Blood Pressure Monitor Automat) ENOC Use 2 (two) times a day 1 each 0   • ferrous sulfate 324 (65 Fe) mg Take 1 tablet (324 mg total) by mouth in the morning for 90 doses 90 tablet 3   • metoprolol succinate (TOPROL-XL) 50 mg 24 hr tablet Take 1 tablet (50 mg total) by mouth 2 (two) times a day 60 tablet 0   • spironolactone (ALDACTONE) 50 mg tablet Take 1 tablet (50 mg total) by mouth daily 90 tablet 1   • tiZANidine (ZANAFLEX) 2 mg tablet Take 1 tablet (2 mg total) by mouth daily at bedtime 30 tablet 4   • Ascorbic Acid (vitamin C) 1000 MG tablet Take 1 tablet (1,000 mg total) by mouth daily (Patient not taking: Reported on 2023) 30 tablet 2   • methocarbamol (ROBAXIN) 500 mg tablet Take 1 tablet (500 mg total) by mouth daily at bedtime as needed for muscle spasms (Patient not taking: Reported on 2023) 30 tablet 0 No current facility-administered medications for this visit. Current Outpatient Medications on File Prior to Visit   Medication Sig   • aspirin 81 mg chewable tablet Chew 1 tablet (81 mg total) daily   • Blood Pressure Monitoring (Blood Pressure Monitor Automat) ENOC Use 2 (two) times a day   • ferrous sulfate 324 (65 Fe) mg Take 1 tablet (324 mg total) by mouth in the morning for 90 doses   • metoprolol succinate (TOPROL-XL) 50 mg 24 hr tablet Take 1 tablet (50 mg total) by mouth 2 (two) times a day   • spironolactone (ALDACTONE) 50 mg tablet Take 1 tablet (50 mg total) by mouth daily   • Ascorbic Acid (vitamin C) 1000 MG tablet Take 1 tablet (1,000 mg total) by mouth daily (Patient not taking: Reported on 8/4/2023)   • methocarbamol (ROBAXIN) 500 mg tablet Take 1 tablet (500 mg total) by mouth daily at bedtime as needed for muscle spasms (Patient not taking: Reported on 6/22/2023)   • [DISCONTINUED] atorvastatin (LIPITOR) 40 mg tablet Take 1 tablet (40 mg total) by mouth every evening (Patient not taking: Reported on 8/4/2023)     No current facility-administered medications on file prior to visit. She has No Known Allergies. .         Objective:    Blood pressure 140/96, pulse 77, height 4' 11" (1.499 m), weight 60.8 kg (134 lb), SpO2 98 %, not currently breastfeeding. Physical Exam  Constitutional:       General: She is not in acute distress. HENT:      Head: Normocephalic and atraumatic. Nose: Nose normal.      Mouth/Throat:      Mouth: Mucous membranes are moist.      Pharynx: Oropharynx is clear. No oropharyngeal exudate or posterior oropharyngeal erythema. Eyes:      General: Lids are normal.      Extraocular Movements: Extraocular movements intact. Pupils: Pupils are equal, round, and reactive to light. Neck:      Comments: Neck muscle tightness and tenderness to palpation  Cardiovascular:      Rate and Rhythm: Normal rate. Pulses: Normal pulses.    Pulmonary:      Effort: Pulmonary effort is normal. No respiratory distress. Musculoskeletal:      Cervical back: Normal range of motion. Tenderness present. Skin:     General: Skin is warm and dry. Neurological:      Mental Status: She is alert. Motor: Motor strength is normal.     Deep Tendon Reflexes:      Reflex Scores:       Tricep reflexes are 2+ on the right side and 2+ on the left side. Bicep reflexes are 2+ on the right side and 2+ on the left side. Brachioradialis reflexes are 2+ on the right side and 2+ on the left side. Patellar reflexes are 2+ on the right side and 2+ on the left side. Achilles reflexes are 2+ on the right side and 2+ on the left side. Psychiatric:         Mood and Affect: Mood normal.         Speech: Speech normal.         Neurological Exam  Mental Status  Alert. Oriented to person, place and time. Speech is normal. Language is fluent with no aphasia. Cranial Nerves  CN II: Visual acuity is normal. Visual fields full to confrontation. Right funduscopic exam: not visualized. Left funduscopic exam: not visualized. CN III, IV, VI: Extraocular movements intact bilaterally. Normal lids and orbits bilaterally. Pupils equal round and reactive to light bilaterally. CN V: Facial sensation is normal.  CN VII: Full and symmetric facial movement. CN VIII: Hearing is normal.  CN IX, X: Palate elevates symmetrically  CN XI: Shoulder shrug strength is normal.  CN XII: Tongue midline without atrophy or fasciculations. Motor  Normal muscle bulk throughout. No fasciculations present. Normal muscle tone. No abnormal involuntary movements. Strength is 5/5 throughout all four extremities. Sensory  Sensation is intact to light touch, pinprick, vibration and proprioception in all four extremities.     Reflexes                                            Right                      Left  Brachioradialis                    2+                         2+  Biceps 2+                         2+  Triceps                                2+                         2+  Patellar                                2+                         2+  Achilles                                2+                         2+    Coordination  Right: Finger-to-nose normal.Left: Finger-to-nose normal.    Gait  Casual gait is normal including stance, stride, and arm swing. ROS:    Review of Systems   Constitutional: Negative for chills and fever. HENT: Negative for ear pain and sore throat. Eyes: Negative for pain and visual disturbance. Respiratory: Negative for cough and shortness of breath. Cardiovascular: Negative for chest pain and palpitations. Gastrointestinal: Negative for abdominal pain and vomiting. Genitourinary: Negative for dysuria and hematuria. Musculoskeletal: Positive for neck pain. Negative for arthralgias and back pain. Neck muscle tightness   Skin: Negative for color change and rash. Neurological: Negative for seizures, syncope, weakness and headaches. All other systems reviewed and are negative.

## 2023-08-29 NOTE — ASSESSMENT & PLAN NOTE
Patient used to follow-up with Dr. Heidi Bermudez cardiologist however she lost to follow-up.     New referral to cardiology is given for blood pressure management    Currently patient is taking metoprolol succinate 50 mg twice daily and spironolactone 50 mg daily

## 2023-09-09 DIAGNOSIS — I10 PRIMARY HYPERTENSION: ICD-10-CM

## 2023-09-11 DIAGNOSIS — R29.898 LEFT ARM WEAKNESS: ICD-10-CM

## 2023-09-11 RX ORDER — ASPIRIN 81 MG/1
81 TABLET, CHEWABLE ORAL DAILY
Qty: 30 TABLET | Refills: 0 | OUTPATIENT
Start: 2023-09-11

## 2023-09-11 RX ORDER — METOPROLOL SUCCINATE 50 MG/1
50 TABLET, EXTENDED RELEASE ORAL 2 TIMES DAILY
Qty: 60 TABLET | Refills: 0 | OUTPATIENT
Start: 2023-09-11

## 2023-10-04 ENCOUNTER — TELEPHONE (OUTPATIENT)
Dept: CARDIOLOGY CLINIC | Facility: CLINIC | Age: 48
End: 2023-10-04

## 2023-10-04 DIAGNOSIS — I10 PRIMARY HYPERTENSION: ICD-10-CM

## 2023-10-04 RX ORDER — METOPROLOL SUCCINATE 50 MG/1
50 TABLET, EXTENDED RELEASE ORAL 2 TIMES DAILY
Qty: 60 TABLET | Refills: 0 | Status: SHIPPED | OUTPATIENT
Start: 2023-10-04 | End: 2023-11-03

## 2023-10-04 NOTE — TELEPHONE ENCOUNTER
Office attempted to return patient's call and speak with them directly about rescheduling an appointment due to insurance issue. There was no answer and a message was left informing patient that office did cancel the appointment for patient but patient would need to return call to reschedule another appointment. Office number was provided in the message.

## 2023-10-04 NOTE — TELEPHONE ENCOUNTER
Medication Refill Request     Name metoprolol succinate (TOPROL-XL) 50 mg 24 hr tablet ()  Dose/Frequency Take 1 tablet (50 mg total) by mouth 2 (two) times a day  Quantity 60  Verified pharmacy   [x]  Verified ordering Provider   [x]  Does patient have enough for the next 3 days? Yes [x] No []    Medication Refill Request     Name loratadine (CLARITIN) 10 mg tablet [[id number]]  DISCONTINUED    Dose/Frequency Take 1 tablet (10 mg total) by mouth daily  Quantity 30  Verified pharmacy   [x]  Verified ordering Provider   [x]  Does patient have enough for the next 3 days?  Yes [x] No []

## 2023-10-30 ENCOUNTER — TELEPHONE (OUTPATIENT)
Age: 48
End: 2023-10-30

## 2023-10-30 NOTE — TELEPHONE ENCOUNTER
Pt. Is only available certain days and she said she needs a F/U appointment.  She  would like to be on a cancellation list for Nov. 9th or 10th.  ty

## 2023-10-31 DIAGNOSIS — I10 PRIMARY HYPERTENSION: ICD-10-CM

## 2023-10-31 RX ORDER — METOPROLOL SUCCINATE 50 MG/1
50 TABLET, EXTENDED RELEASE ORAL 2 TIMES DAILY
Qty: 60 TABLET | Refills: 0 | Status: SHIPPED | OUTPATIENT
Start: 2023-10-31

## 2023-11-13 ENCOUNTER — TELEPHONE (OUTPATIENT)
Age: 48
End: 2023-11-13

## 2023-11-13 DIAGNOSIS — E61.1 IRON DEFICIENCY: ICD-10-CM

## 2023-11-13 NOTE — TELEPHONE ENCOUNTER
Patient states that the pharmacy told her she needs to contact her PCP as she was given a different brand name of her medication of Spironolactone. She states the pharmacy changed the brand and the new brand makes her ears burn. She wants to go back to the original brand but was unable to tell me the original brand name. She stated that the pharmacy told her to call office.

## 2023-11-13 NOTE — TELEPHONE ENCOUNTER
Patient called back and stated the old medication that she would like to have sent to the pharmacy has the same name, spironolactone, it is just a different brand. The new pill that made her ears burn is white, the one that she would like sent to the pharmacy was yellow.

## 2023-11-13 NOTE — TELEPHONE ENCOUNTER
Called patient to confirm on medication   If her old medication is losartan (cozaar) 50 mg that she would like to be sent to the pharmacy

## 2023-11-13 NOTE — TELEPHONE ENCOUNTER
Refill must be reviewed and completed by the office or provider.  The refill is unable to be approved by the medication management team.     RBC

## 2023-11-14 RX ORDER — FERROUS SULFATE 324(65)MG
324 TABLET, DELAYED RELEASE (ENTERIC COATED) ORAL DAILY
Qty: 90 TABLET | Refills: 3 | Status: SHIPPED | OUTPATIENT
Start: 2023-11-14 | End: 2024-11-08

## 2023-11-15 ENCOUNTER — TELEPHONE (OUTPATIENT)
Age: 48
End: 2023-11-15

## 2023-11-15 NOTE — TELEPHONE ENCOUNTER
Pt called back. She does not want to make an appt. I did not understand what she wanted to do, so transferred to [facility] to discuss.

## 2023-11-15 NOTE — TELEPHONE ENCOUNTER
Patient called and stated that she picked up medication spironolactone   it is just a different brand. The new pill that made her ears burn is white, the one that she would like sent to the pharmacy was yellow. Please advise.

## 2023-11-17 NOTE — TELEPHONE ENCOUNTER
Spoke to [employer]. The pill that the patient is referring to is spironlactone , : Amneal   The pill is yellow     Please place order.

## 2023-11-22 DIAGNOSIS — I10 PRIMARY HYPERTENSION: ICD-10-CM

## 2023-11-22 RX ORDER — SPIRONOLACTONE 50 MG/1
50 TABLET, FILM COATED ORAL DAILY
Qty: 90 TABLET | Refills: 1 | Status: SHIPPED | OUTPATIENT
Start: 2023-11-22

## 2023-11-30 ENCOUNTER — OFFICE VISIT (OUTPATIENT)
Dept: CARDIOLOGY CLINIC | Facility: CLINIC | Age: 48
End: 2023-11-30
Payer: COMMERCIAL

## 2023-11-30 VITALS
SYSTOLIC BLOOD PRESSURE: 130 MMHG | BODY MASS INDEX: 27.82 KG/M2 | HEIGHT: 59 IN | HEART RATE: 70 BPM | WEIGHT: 138 LBS | DIASTOLIC BLOOD PRESSURE: 82 MMHG | TEMPERATURE: 98 F | OXYGEN SATURATION: 99 %

## 2023-11-30 DIAGNOSIS — I51.7 LVH (LEFT VENTRICULAR HYPERTROPHY): ICD-10-CM

## 2023-11-30 DIAGNOSIS — I10 HYPERTENSION: Primary | ICD-10-CM

## 2023-11-30 PROCEDURE — 99204 OFFICE O/P NEW MOD 45 MIN: CPT | Performed by: INTERNAL MEDICINE

## 2023-11-30 PROCEDURE — 93000 ELECTROCARDIOGRAM COMPLETE: CPT | Performed by: INTERNAL MEDICINE

## 2023-11-30 NOTE — PROGRESS NOTES
[facility]    CHIEF COMPLAINT: No chief complaint on file. HPI:  Bijan Sanchez is a 53 y.o. female who was referred for hypertension and severe left ventricular hypertrophy on echocardiogram.    She reports being diagnosed with high blood pressure about ?3-4 years ago. She is currently taking metoprolol succinate 50 mg once daily. She also takes spironolactone 50 mg daily. She had an echocardiogram in June of this year which showed severe LVH and speckled pattern. Notably, she had a cardiac MRI in 2016 which was ordered to rule out a ventricular septal defect. No definite VSD was seen. Wall thickness was reported as normal on this study. ROS:  +numbness and tingling in her fingers for about a year. 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   +headaches  +Reports palpitations which occur at night time when she has her period. +cervicalgia but hasn't had cercival MRI  -Denies any visual changes, lightheadedness, syncope, chest pain, shortness of breath exertion, orthopnea, PND, lower extremity swelling,. Social history: Never smoker. Family history: No family history of cardiac issues.     The following portions of the patient's history were reviewed and updated as appropriate: allergies, current medications, past family history, past medical history, past social history, past surgical history, and problem list.    SINCE LAST OV I REVIEWED WITH THE PATIENT THE INTERIM LABS, TEST RESULTS, CONSULTANT(S) NOTES AND PERFORMED AN INTERIM REVIEW OF HISTORY    Past Medical History:   Diagnosis Date    Allergic conjunctivitis     last assessed 5/14/15    Chest wall contusion     last assessed 11/1/16    Cyst of right breast     last assesed 10/12/15    Fibroadenoma of right breast     last assessed 7/21/16    Hypertension     last assessed 7/12/16    Hypertension     Iron deficiency     Syphilis        Past Surgical History:   Procedure Laterality Date    BREAST BIOPSY Right     2-3 yrs ago     SECTION  2008    TUBAL LIGATION  2008       Social History     Socioeconomic History    Marital status: Single     Spouse name: Not on file    Number of children: Not on file    Years of education: Not on file    Highest education level: Not on file   Occupational History    Not on file   Tobacco Use    Smoking status: Never    Smokeless tobacco: Never   Vaping Use    Vaping Use: Never used   Substance and Sexual Activity    Alcohol use: No    Drug use: No    Sexual activity: Not Currently     Partners: Male     Birth control/protection: Pill, Injection   Other Topics Concern    Not on file   Social History Narrative    ** Merged History Encounter **          Social Determinants of Health     Financial Resource Strain: Not on file   Food Insecurity: No Food Insecurity (2023)    Hunger Vital Sign     Worried About Running Out of Food in the Last Year: Never true     Ran Out of Food in the Last Year: Never true   Transportation Needs: No Transportation Needs (2023)    PRAPARE - Transportation     Lack of Transportation (Medical): No     Lack of Transportation (Non-Medical):  No   Physical Activity: Not on file   Stress: Not on file   Social Connections: Not on file   Intimate Partner Violence: Not on file   Housing Stability: Low Risk  (2023)    Housing Stability Vital Sign     Unable to Pay for Housing in the Last Year: No     Number of Places Lived in the Last Year: 1     Unstable Housing in the Last Year: No       Family History   Problem Relation Age of Onset    Hypertension Father        No Known Allergies    Current Outpatient Medications   Medication Sig Dispense Refill    Blood Pressure Monitoring (Blood Pressure Monitor Automat) ENOC Use 2 (two) times a day 1 each 0    ferrous sulfate 324 (65 Fe) mg Take 1 tablet (324 mg total) by mouth in the morning for 360 doses 90 tablet 3    metoprolol succinate (TOPROL-XL) 50 mg 24 hr tablet take 1 tablet by mouth twice a day 60 tablet 0    spironolactone (ALDACTONE) 50 mg tablet Take 1 tablet (50 mg total) by mouth daily 90 tablet 1    Ascorbic Acid (vitamin C) 1000 MG tablet Take 1 tablet (1,000 mg total) by mouth daily (Patient not taking: Reported on 8/4/2023) 30 tablet 2    aspirin 81 mg chewable tablet Chew 1 tablet (81 mg total) daily (Patient not taking: Reported on 11/30/2023) 30 tablet 0    atorvastatin (LIPITOR) 40 mg tablet Take 1 tablet (40 mg total) by mouth every evening (Patient not taking: Reported on 11/30/2023) 30 tablet 4    methocarbamol (ROBAXIN) 500 mg tablet Take 1 tablet (500 mg total) by mouth daily at bedtime as needed for muscle spasms (Patient not taking: Reported on 6/22/2023) 30 tablet 0    tiZANidine (ZANAFLEX) 2 mg tablet Take 1 tablet (2 mg total) by mouth daily at bedtime (Patient not taking: Reported on 11/30/2023) 30 tablet 4     No current facility-administered medications for this visit. /82 (BP Location: Left arm, Patient Position: Sitting, Cuff Size: Standard)   Temp 98 °F (36.7 °C) (Tympanic)   Ht 4' 11" (1.499 m)   Wt 62.6 kg (138 lb)   SpO2 99%   BMI 27.87 kg/m²     Review of Systems   All other systems reviewed and are negative. Physical Exam  Vitals and nursing note reviewed. Constitutional:       General: She is not in acute distress. Appearance: Normal appearance. She is well-developed. She is not toxic-appearing. HENT:      Head: Normocephalic and atraumatic. Eyes:      General: No scleral icterus. Extraocular Movements: Extraocular movements intact. Conjunctiva/sclera: Conjunctivae normal.   Cardiovascular:      Rate and Rhythm: Normal rate and regular rhythm. Pulses: Normal pulses. Heart sounds: Normal heart sounds. No murmur heard. No gallop. Pulmonary:      Effort: Pulmonary effort is normal. No respiratory distress. Breath sounds: Normal breath sounds. No wheezing or rales.    Abdominal: General: Abdomen is flat. Bowel sounds are normal. There is no distension. Palpations: Abdomen is soft. Tenderness: There is no abdominal tenderness. Musculoskeletal:      Right lower leg: No edema. Left lower leg: No edema. Skin:     General: Skin is warm and dry. Capillary Refill: Capillary refill takes less than 2 seconds. Coloration: Skin is not jaundiced. Neurological:      Mental Status: She is alert. Psychiatric:         Mood and Affect: Mood normal.         Behavior: Behavior normal.          Lab Results   Component Value Date     12/16/2015    K 4.3 06/20/2023     06/20/2023    CO2 26 06/20/2023    BUN 19 06/20/2023    CREATININE 0.88 06/20/2023    GLUCOSE 94 12/16/2015    CALCIUM 9.0 06/20/2023    ALT 26 10/20/2021    AST 20 10/20/2021    INR 1.06 06/17/2023       Lab Results   Component Value Date    CHOL 143 08/04/2015    HDL 63 06/18/2023    LDLCALC 119 (H) 06/18/2023    TRIG 40 06/18/2023       Lab Results   Component Value Date    WBC 3.07 (L) 06/20/2023    HGB 13.5 06/20/2023    HCT 43.7 06/20/2023     06/20/2023       Lab Results   Component Value Date     06/18/2023    HGBA1C 5.8 (H) 06/18/2023     Cardiac studies:   TTE - 6/18/23:    Left Ventricle: Left ventricular cavity size is normal. Wall thickness is moderately increased. There is severe concentric hypertrophy. Systolic function is normal. Wall motion is normal. Diastolic function is mildly abnormal, consistent with grade I (abnormal) relaxation. There is a prominent myocardial speckle pattern. Mitral Valve: There is mild regurgitation. Cardiac MRI - 6/28/2016:  Impression:  1. Normal left and right ventricular size and systolic function. 2. No significant valvular abnormalities. 3. No obvious ventricular septal defect.   Unable to acquire flows for Qp:Qs calculation, however aortic flows appears higher than pulmonic flows, would which would suggest no hemodynamically significant ventricular septal defect. ASSESSMENT AND PLAN:  Diagnoses and all orders for this visit:    Hypertension  -     Ambulatory Referral to Cardiology  -     POCT ECG  -     Basic metabolic panel; Future  -     TIBC Panel (incl. Iron, TIBC, % Iron Saturation)    LVH (left ventricular hypertrophy)  -     Immunoglobulin free LT chains blood; Future  -     Immunofixation IgA,IgG,IgM Qt. Immunofixation Serum Immunoglobulin; Future  -     IMMUNOFIXATION (CELIO) AND PROTEIN ELECTROPHORESIS, RANDOM URINE  -     MRI cardiac  w wo contrast; Future  -     Basic metabolic panel; Future  -     TIBC Panel (incl.  Iron, TIBC, % Iron Saturation)    No acute cardiac or pulm complaints  No s/s volume overload  Reports 3-4 yr history of hypertension  Echo shows severe LVH but normal voltages on ECG  ECG - NSR, possible LAE, nonspecific T wave abnormality   No LVH or low voltage on ECG (only in 30% of patients)  Numbness & tingling symptoms in bilateral wrists however, job requires a lot of repetitive wrist movements   Cervicalgia - no prior MRI   Reports history HTN for 3-4 years but I see back to 2015 at least she had high blood pressure  No macroglossia     Initial work up:  Serum free light chain assay   Serum and urine immunofixation electrophoresis   Check cardiac MRI again to assess for infiltrative disease    Further diagnostics TBD pending the above results     Segrio Bryant MD

## 2023-12-02 DIAGNOSIS — I10 PRIMARY HYPERTENSION: ICD-10-CM

## 2023-12-04 RX ORDER — METOPROLOL SUCCINATE 50 MG/1
50 TABLET, EXTENDED RELEASE ORAL 2 TIMES DAILY
Qty: 60 TABLET | Refills: 0 | Status: SHIPPED | OUTPATIENT
Start: 2023-12-04

## 2023-12-15 ENCOUNTER — RA CDI HCC (OUTPATIENT)
Dept: OTHER | Facility: HOSPITAL | Age: 48
End: 2023-12-15

## 2023-12-27 ENCOUNTER — APPOINTMENT (OUTPATIENT)
Dept: LAB | Age: 48
End: 2023-12-27
Payer: COMMERCIAL

## 2023-12-27 DIAGNOSIS — E87.6 HYPOKALEMIA: ICD-10-CM

## 2023-12-27 DIAGNOSIS — D50.8 IRON DEFICIENCY ANEMIA SECONDARY TO INADEQUATE DIETARY IRON INTAKE: ICD-10-CM

## 2023-12-27 DIAGNOSIS — I10 HYPERTENSION: ICD-10-CM

## 2023-12-27 DIAGNOSIS — I51.7 LVH (LEFT VENTRICULAR HYPERTROPHY): ICD-10-CM

## 2023-12-27 LAB
ANION GAP SERPL CALCULATED.3IONS-SCNC: 9 MMOL/L
BUN SERPL-MCNC: 12 MG/DL (ref 5–25)
CALCIUM SERPL-MCNC: 9.2 MG/DL (ref 8.4–10.2)
CHLORIDE SERPL-SCNC: 105 MMOL/L (ref 96–108)
CO2 SERPL-SCNC: 29 MMOL/L (ref 21–32)
CREAT SERPL-MCNC: 0.82 MG/DL (ref 0.6–1.3)
GFR SERPL CREATININE-BSD FRML MDRD: 85 ML/MIN/1.73SQ M
GLUCOSE P FAST SERPL-MCNC: 96 MG/DL (ref 65–99)
IGA SERPL-MCNC: 143 MG/DL (ref 66–433)
IGG SERPL-MCNC: 1567 MG/DL (ref 635–1741)
IGM SERPL-MCNC: 79 MG/DL (ref 45–281)
IRON SATN MFR SERPL: 16 % (ref 15–50)
IRON SERPL-MCNC: 52 UG/DL (ref 50–212)
POTASSIUM SERPL-SCNC: 4.1 MMOL/L (ref 3.5–5.3)
SODIUM SERPL-SCNC: 143 MMOL/L (ref 135–147)
TIBC SERPL-MCNC: 323 UG/DL (ref 250–450)
UIBC SERPL-MCNC: 271 UG/DL (ref 155–355)

## 2023-12-27 PROCEDURE — 83521 IG LIGHT CHAINS FREE EACH: CPT

## 2023-12-27 PROCEDURE — 80048 BASIC METABOLIC PNL TOTAL CA: CPT

## 2023-12-27 PROCEDURE — 82784 ASSAY IGA/IGD/IGG/IGM EACH: CPT

## 2023-12-27 PROCEDURE — 86334 IMMUNOFIX E-PHORESIS SERUM: CPT

## 2023-12-27 PROCEDURE — 84165 PROTEIN E-PHORESIS SERUM: CPT

## 2023-12-27 PROCEDURE — 36415 COLL VENOUS BLD VENIPUNCTURE: CPT

## 2023-12-27 PROCEDURE — 84166 PROTEIN E-PHORESIS/URINE/CSF: CPT

## 2023-12-28 LAB
ALBUMIN SERPL ELPH-MCNC: 3.75 G/DL (ref 3.2–5.1)
ALBUMIN SERPL ELPH-MCNC: 55.2 % (ref 48–70)
ALBUMIN UR ELPH-MCNC: 100 %
ALPHA1 GLOB MFR UR ELPH: 0 %
ALPHA1 GLOB SERPL ELPH-MCNC: 0.3 G/DL (ref 0.15–0.47)
ALPHA1 GLOB SERPL ELPH-MCNC: 4.4 % (ref 1.8–7)
ALPHA2 GLOB MFR UR ELPH: 0 %
ALPHA2 GLOB SERPL ELPH-MCNC: 0.65 G/DL (ref 0.42–1.04)
ALPHA2 GLOB SERPL ELPH-MCNC: 9.5 % (ref 5.9–14.9)
B-GLOBULIN MFR UR ELPH: 0 %
BETA GLOB ABNORMAL SERPL ELPH-MCNC: 0.37 G/DL (ref 0.31–0.57)
BETA1 GLOB SERPL ELPH-MCNC: 5.5 % (ref 4.7–7.7)
BETA2 GLOB SERPL ELPH-MCNC: 4.8 % (ref 3.1–7.9)
BETA2+GAMMA GLOB SERPL ELPH-MCNC: 0.33 G/DL (ref 0.2–0.58)
GAMMA GLOB ABNORMAL SERPL ELPH-MCNC: 1.4 G/DL (ref 0.4–1.66)
GAMMA GLOB MFR UR ELPH: 0 %
GAMMA GLOB SERPL ELPH-MCNC: 20.6 % (ref 6.9–22.3)
IGG/ALB SER: 1.23 {RATIO} (ref 1.1–1.8)
INTERPRETATION UR IFE-IMP: NORMAL
KAPPA LC FREE SER-MCNC: 26.5 MG/L (ref 3.3–19.4)
KAPPA LC FREE/LAMBDA FREE SER: 2.04 {RATIO} (ref 0.26–1.65)
LAMBDA LC FREE SERPL-MCNC: 13 MG/L (ref 5.7–26.3)
PROT PATTERN SERPL ELPH-IMP: NORMAL
PROT PATTERN UR ELPH-IMP: NORMAL
PROT SERPL-MCNC: 6.8 G/DL (ref 6.4–8.2)
PROT UR-MCNC: 7 MG/DL

## 2023-12-28 PROCEDURE — 86334 IMMUNOFIX E-PHORESIS SERUM: CPT | Performed by: STUDENT IN AN ORGANIZED HEALTH CARE EDUCATION/TRAINING PROGRAM

## 2023-12-28 PROCEDURE — 84166 PROTEIN E-PHORESIS/URINE/CSF: CPT | Performed by: STUDENT IN AN ORGANIZED HEALTH CARE EDUCATION/TRAINING PROGRAM

## 2023-12-28 PROCEDURE — 84165 PROTEIN E-PHORESIS SERUM: CPT | Performed by: STUDENT IN AN ORGANIZED HEALTH CARE EDUCATION/TRAINING PROGRAM

## 2023-12-31 DIAGNOSIS — I10 PRIMARY HYPERTENSION: ICD-10-CM

## 2024-01-02 RX ORDER — METOPROLOL SUCCINATE 50 MG/1
50 TABLET, EXTENDED RELEASE ORAL 2 TIMES DAILY
Qty: 60 TABLET | Refills: 5 | Status: SHIPPED | OUTPATIENT
Start: 2024-01-02

## 2024-01-04 ENCOUNTER — HOSPITAL ENCOUNTER (OUTPATIENT)
Dept: MRI IMAGING | Facility: HOSPITAL | Age: 49
Discharge: HOME | End: 2024-01-04
Attending: INTERNAL MEDICINE
Payer: COMMERCIAL

## 2024-01-04 DIAGNOSIS — I51.7 LVH (LEFT VENTRICULAR HYPERTROPHY): ICD-10-CM

## 2024-01-04 PROCEDURE — A9585 GADOBUTROL INJECTION: HCPCS | Performed by: INTERNAL MEDICINE

## 2024-01-04 PROCEDURE — G1004 CDSM NDSC: HCPCS

## 2024-01-04 PROCEDURE — 75561 CARDIAC MRI FOR MORPH W/DYE: CPT

## 2024-01-04 RX ORDER — GADOBUTROL 604.72 MG/ML
12 INJECTION INTRAVENOUS
Status: COMPLETED | OUTPATIENT
Start: 2024-01-04 | End: 2024-01-04

## 2024-01-04 RX ADMIN — GADOBUTROL 12 ML: 604.72 INJECTION INTRAVENOUS at 13:06

## 2024-01-19 ENCOUNTER — TELEPHONE (OUTPATIENT)
Dept: CARDIOLOGY CLINIC | Facility: CLINIC | Age: 49
End: 2024-01-19

## 2024-02-29 ENCOUNTER — OFFICE VISIT (OUTPATIENT)
Dept: CARDIOLOGY CLINIC | Facility: CLINIC | Age: 49
End: 2024-02-29
Payer: COMMERCIAL

## 2024-02-29 VITALS
WEIGHT: 131.4 LBS | DIASTOLIC BLOOD PRESSURE: 84 MMHG | HEIGHT: 59 IN | HEART RATE: 74 BPM | SYSTOLIC BLOOD PRESSURE: 134 MMHG | OXYGEN SATURATION: 99 % | BODY MASS INDEX: 26.49 KG/M2

## 2024-02-29 DIAGNOSIS — I51.7 LVH (LEFT VENTRICULAR HYPERTROPHY): Primary | ICD-10-CM

## 2024-02-29 PROCEDURE — 99212 OFFICE O/P EST SF 10 MIN: CPT | Performed by: INTERNAL MEDICINE

## 2024-02-29 NOTE — PROGRESS NOTES
[facility]    CHIEF COMPLAINT: No chief complaint on file.      HPI:  Marilyn Delong is a 48 y.o. female who was referred for hypertension and severe left ventricular hypertrophy on echocardiogram.    Initial OV - 11/30/23: She reports being diagnosed with high blood pressure about ?3-4 years ago. She is currently taking metoprolol succinate 50 mg once daily. She also takes spironolactone 50 mg daily. She had an echocardiogram in June 2023 which showed severe LVH and speckled pattern. Notably, she had a cardiac MRI in 2016 which was ordered to rule out a ventricular septal defect. No definite VSD was seen. Wall thickness was reported as normal on this study. ROS:  +numbness and tingling in her fingers for about a year. 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 +headaches, +reports palpitations which occur at night time when she has her period, +cervicalgia but hasn't had cercival MRI. Family history: No family history of cardiac issues.    Interval history:  Presents today for follow up after lab work and cardiac MRI  MRI did not show infiltrative disease and rather mild-moderate LVH  Overall feels okay without new complaints. She denies any lightheadedness, syncope, chest pain, shortness of breath, orthopnea. Numbness in her fingers improved.    The following portions of the patient's history were reviewed and updated as appropriate: allergies, current medications, past family history, past medical history, past social history, past surgical history, and problem list.    SINCE LAST OV I REVIEWED WITH THE PATIENT THE INTERIM LABS, TEST RESULTS, CONSULTANT(S) NOTES AND PERFORMED AN INTERIM REVIEW OF HISTORY    Past Medical History:   Diagnosis Date    Allergic conjunctivitis     last assessed 5/14/15    Chest wall contusion     last assessed 11/1/16    Cyst of right breast     last assesed 10/12/15    Fibroadenoma of right breast     last assessed 7/21/16     Hypertension     last assessed 16    Hypertension     Iron deficiency     Syphilis        Past Surgical History:   Procedure Laterality Date    BREAST BIOPSY Right     2-3 yrs ago     SECTION  2008    TUBAL LIGATION  2008       Social History     Socioeconomic History    Marital status: Single     Spouse name: Not on file    Number of children: Not on file    Years of education: Not on file    Highest education level: Not on file   Occupational History    Not on file   Tobacco Use    Smoking status: Never    Smokeless tobacco: Never   Vaping Use    Vaping status: Never Used   Substance and Sexual Activity    Alcohol use: No    Drug use: No    Sexual activity: Not Currently     Partners: Male     Birth control/protection: Pill, Injection   Other Topics Concern    Not on file   Social History Narrative    ** Merged History Encounter **          Social Determinants of Health     Financial Resource Strain: Not on file   Food Insecurity: No Food Insecurity (2023)    Hunger Vital Sign     Worried About Running Out of Food in the Last Year: Never true     Ran Out of Food in the Last Year: Never true   Transportation Needs: No Transportation Needs (2023)    PRAPARE - Transportation     Lack of Transportation (Medical): No     Lack of Transportation (Non-Medical): No   Physical Activity: Not on file   Stress: Not on file   Social Connections: Not on file   Intimate Partner Violence: Not on file   Housing Stability: Low Risk  (2023)    Housing Stability Vital Sign     Unable to Pay for Housing in the Last Year: No     Number of Places Lived in the Last Year: 1     Unstable Housing in the Last Year: No       Family History   Problem Relation Age of Onset    Hypertension Father        Allergies   Allergen Reactions    Amlodipine Edema    Atorvastatin Myalgia    Losartan Palpitations       Current Outpatient Medications   Medication Sig Dispense Refill    ferrous sulfate 324 (65 Fe) mg Take 1 tablet  "(324 mg total) by mouth in the morning for 360 doses (Patient not taking: Reported on 6/3/2024) 90 tablet 3    metoprolol succinate (TOPROL-XL) 50 mg 24 hr tablet take 1 tablet by mouth twice a day 60 tablet 5    spironolactone (ALDACTONE) 50 mg tablet Take 1 tablet (50 mg total) by mouth daily 90 tablet 1    Ascorbic Acid (vitamin C) 1000 MG tablet Take 1 tablet (1,000 mg total) by mouth daily (Patient not taking: Reported on 8/4/2023) 30 tablet 2    aspirin 81 mg chewable tablet CHEW AND SWALLOW 1 tablet once daily 30 tablet 5    Blood Pressure Monitoring (Blood Pressure Monitor Automat) ENOC Use 2 (two) times a day (Patient not taking: Reported on 2/29/2024) 1 each 0    tiZANidine (ZANAFLEX) 2 mg tablet Take 1 tablet (2 mg total) by mouth daily at bedtime (Patient taking differently: Take 2 mg by mouth if needed) 30 tablet 4     No current facility-administered medications for this visit.       /84 (BP Location: Right arm, Patient Position: Sitting, Cuff Size: Standard)   Pulse 74   Ht 4' 11\" (1.499 m)   Wt 59.6 kg (131 lb 6.4 oz)   SpO2 99%   BMI 26.54 kg/m²     Review of Systems   All other systems reviewed and are negative.      Physical Exam  Vitals reviewed.   Constitutional:       General: She is not in acute distress.     Appearance: Normal appearance. She is well-developed. She is not toxic-appearing.   HENT:      Head: Normocephalic and atraumatic.   Eyes:      General: No scleral icterus.  Cardiovascular:      Rate and Rhythm: Normal rate and regular rhythm.      Pulses: Normal pulses.      Heart sounds: Normal heart sounds. No murmur heard.     No gallop.   Pulmonary:      Effort: Pulmonary effort is normal. No respiratory distress.      Breath sounds: Normal breath sounds. No wheezing or rales.   Abdominal:      General: Abdomen is flat. Bowel sounds are normal. There is no distension.      Palpations: Abdomen is soft.      Tenderness: There is no abdominal tenderness.   Skin:     General: " Skin is warm and dry.      Capillary Refill: Capillary refill takes less than 2 seconds.      Coloration: Skin is not jaundiced.   Neurological:      Mental Status: She is alert.   Psychiatric:         Mood and Affect: Mood normal.         Behavior: Behavior normal.          Lab Results   Component Value Date     12/16/2015    K 3.7 06/07/2024     06/07/2024    CO2 31 06/07/2024    BUN 13 06/07/2024    CREATININE 0.79 06/07/2024    GLUCOSE 94 12/16/2015    CALCIUM 9.3 06/07/2024    ALT 17 06/07/2024    AST 19 06/07/2024    INR 1.06 06/17/2023       Lab Results   Component Value Date    CHOL 143 08/04/2015    HDL 63 06/18/2023    LDLCALC 119 (H) 06/18/2023    TRIG 40 06/18/2023       Lab Results   Component Value Date    WBC 3.12 (L) 06/07/2024    HGB 14.8 06/07/2024    HCT 45.4 06/07/2024     06/07/2024       Lab Results   Component Value Date     06/18/2023    HGBA1C 5.8 (H) 06/18/2023     Cardiac studies:   Cardiac MRI - (1/4/24)  Findings:  1. Slightly reduced left ventricular end-diastolic volume with normal systolic function. Mild to moderate concentric left ventricular hypertrophy with increased wall thickness compared to cardiac MRI 6/28/2016. No regional wall motion abnormalities.  2. Normal right ventricular size and systolic function.  3. The aortic, mitral, and tricuspid valves open without restriction.  There is no significant valvular regurgitation, however cine MRI is inaccurate in the qualitative assessment of valvular regurgitation.  4. The left atrium is normal in size. The aortic root is top normal in size.  5. There is no evidence of myocardial edema.  6. Delayed post-gadolinium imaging demonstrates no region of hyperenhancement.     IMPRESSION:  Impression:  1. Slightly reduced left ventricular end-diastolic volume with normal systolic function. Mild to moderate concentric left ventricular hypertrophy with increased wall thickness compared to MRI 6/28/2016.  2. Normal  right ventricular size and systolic function.  3. Normal bilateral atria. Top normal aortic root size. No valvular abnormalities.  4. No evidence of myocardial inflammation, infiltrative disease or scarring.    TTE - 6/18/23:    Left Ventricle: Left ventricular cavity size is normal. Wall thickness is moderately increased. There is severe concentric hypertrophy. Systolic function is normal. Wall motion is normal. Diastolic function is mildly abnormal, consistent with grade I (abnormal) relaxation. There is a prominent myocardial speckle pattern.    Mitral Valve: There is mild regurgitation.     Cardiac MRI - 6/28/2016:  Impression:  1. Normal left and right ventricular size and systolic function.  2. No significant valvular abnormalities.  3. No obvious ventricular septal defect.  Unable to acquire flows for Qp:Qs calculation, however aortic flows appears higher than pulmonic flows, would which would suggest no hemodynamically significant ventricular septal defect.    ASSESSMENT AND PLAN:  Diagnoses and all orders for this visit:    LVH (left ventricular hypertrophy): Reports history HTN for 3-4 years but I see back to 2015 at least she had high blood pressure. Has cervicalgia but no MRI. Numbness & tingling symptoms in bilateral wrists however, job requires a lot of repetitive wrist movements. Echo shows severe LVH but normal voltages on ECG. No LVH or low voltage on ECG (only in 30% of patients).     Cardiac MRI without evidence for infiltrative disease. Mild to moderate LVH by cardiac MRI. TIBC panel normal. Kappa/Lambda 2.0 in the absence of known renal disease. Serum immunofixation with no monoclonal immunoglobulins. UPEP without monoclonal bands.    Repeat Kappa/Lambda, UPEP, SPEP in 6 months and if remains abnormal will discuss referral.      Oscar Farmer MD

## 2024-03-26 NOTE — TELEPHONE ENCOUNTER
Patient called refill line stating that she had spilled the rest of her pills that she had left in the bottle for her spironolactone and that she needs a new refill. Asked if the patient had called her pharmacy because we had given her a 6 month supply on 11/22/23. Patient has not yet called pharmacy. Informed her that if it is to early to refill and her insurance is denying it because its to early she would need to call her insurance and let them know what happened so they can approve an early refill. Patient is calling pharmacy now to see if they have the refill and to see if she can fill it and will call back if she does not have another.

## 2024-04-17 DIAGNOSIS — R29.898 LEFT ARM WEAKNESS: ICD-10-CM

## 2024-04-18 RX ORDER — ASPIRIN 81 MG/1
81 TABLET, CHEWABLE ORAL DAILY
Qty: 30 TABLET | Refills: 5 | Status: SHIPPED | OUTPATIENT
Start: 2024-04-18

## 2024-05-28 ENCOUNTER — TELEPHONE (OUTPATIENT)
Age: 49
End: 2024-05-28

## 2024-05-28 ENCOUNTER — APPOINTMENT (EMERGENCY)
Dept: RADIOLOGY | Facility: HOSPITAL | Age: 49
End: 2024-05-28
Payer: COMMERCIAL

## 2024-05-28 ENCOUNTER — HOSPITAL ENCOUNTER (EMERGENCY)
Facility: HOSPITAL | Age: 49
Discharge: HOME | End: 2024-05-28
Attending: EMERGENCY MEDICINE
Payer: COMMERCIAL

## 2024-05-28 VITALS
BODY MASS INDEX: 26.58 KG/M2 | WEIGHT: 131.61 LBS | SYSTOLIC BLOOD PRESSURE: 178 MMHG | DIASTOLIC BLOOD PRESSURE: 101 MMHG | RESPIRATION RATE: 16 BRPM | TEMPERATURE: 98.2 F | OXYGEN SATURATION: 98 % | HEART RATE: 59 BPM

## 2024-05-28 DIAGNOSIS — R51.9 ACUTE NONINTRACTABLE HEADACHE, UNSPECIFIED HEADACHE TYPE: Primary | ICD-10-CM

## 2024-05-28 DIAGNOSIS — E87.6 HYPOKALEMIA: ICD-10-CM

## 2024-05-28 LAB
ALBUMIN SERPL BCP-MCNC: 4.3 G/DL (ref 3.5–5)
ALP SERPL-CCNC: 66 U/L (ref 34–104)
ALT SERPL W P-5'-P-CCNC: 16 U/L (ref 7–52)
ANION GAP SERPL CALCULATED.3IONS-SCNC: 4 MMOL/L (ref 4–13)
AST SERPL W P-5'-P-CCNC: 19 U/L (ref 13–39)
ATRIAL RATE: 76 BPM
BASOPHILS # BLD AUTO: 0.02 THOUSANDS/ÂΜL (ref 0–0.1)
BASOPHILS NFR BLD AUTO: 1 % (ref 0–1)
BILIRUB SERPL-MCNC: 0.69 MG/DL (ref 0.2–1)
BILIRUB UR QL STRIP: NEGATIVE
BUN SERPL-MCNC: 11 MG/DL (ref 5–25)
CALCIUM SERPL-MCNC: 9.1 MG/DL (ref 8.4–10.2)
CARDIAC TROPONIN I PNL SERPL HS: 3 NG/L
CHLORIDE SERPL-SCNC: 105 MMOL/L (ref 96–108)
CLARITY UR: CLEAR
CO2 SERPL-SCNC: 31 MMOL/L (ref 21–32)
COLOR UR: COLORLESS
CREAT SERPL-MCNC: 0.77 MG/DL (ref 0.6–1.3)
EOSINOPHIL # BLD AUTO: 0.09 THOUSAND/ÂΜL (ref 0–0.61)
EOSINOPHIL NFR BLD AUTO: 2 % (ref 0–6)
ERYTHROCYTE [DISTWIDTH] IN BLOOD BY AUTOMATED COUNT: 13.1 % (ref 11.6–15.1)
EXT PREGNANCY TEST URINE: NEGATIVE
EXT. CONTROL: NORMAL
FLUAV RNA RESP QL NAA+PROBE: NEGATIVE
FLUBV RNA RESP QL NAA+PROBE: NEGATIVE
GFR SERPL CREATININE-BSD FRML MDRD: 92 ML/MIN/1.73SQ M
GLUCOSE SERPL-MCNC: 104 MG/DL (ref 65–140)
GLUCOSE UR STRIP-MCNC: NEGATIVE MG/DL
HCT VFR BLD AUTO: 44.5 % (ref 34.8–46.1)
HGB BLD-MCNC: 14.2 G/DL (ref 11.5–15.4)
HGB UR QL STRIP.AUTO: NEGATIVE
IMM GRANULOCYTES # BLD AUTO: 0 THOUSAND/UL (ref 0–0.2)
IMM GRANULOCYTES NFR BLD AUTO: 0 % (ref 0–2)
KETONES UR STRIP-MCNC: NEGATIVE MG/DL
LEUKOCYTE ESTERASE UR QL STRIP: NEGATIVE
LYMPHOCYTES # BLD AUTO: 2.37 THOUSANDS/ÂΜL (ref 0.6–4.47)
LYMPHOCYTES NFR BLD AUTO: 63 % (ref 14–44)
MAGNESIUM SERPL-MCNC: 2 MG/DL (ref 1.9–2.7)
MCH RBC QN AUTO: 27.6 PG (ref 26.8–34.3)
MCHC RBC AUTO-ENTMCNC: 31.9 G/DL (ref 31.4–37.4)
MCV RBC AUTO: 86 FL (ref 82–98)
MONOCYTES # BLD AUTO: 0.28 THOUSAND/ÂΜL (ref 0.17–1.22)
MONOCYTES NFR BLD AUTO: 8 % (ref 4–12)
NEUTROPHILS # BLD AUTO: 0.99 THOUSANDS/ÂΜL (ref 1.85–7.62)
NEUTS SEG NFR BLD AUTO: 26 % (ref 43–75)
NITRITE UR QL STRIP: NEGATIVE
NRBC BLD AUTO-RTO: 0 /100 WBCS
P AXIS: 65 DEGREES
PH UR STRIP.AUTO: 7 [PH]
PLATELET # BLD AUTO: 166 THOUSANDS/UL (ref 149–390)
PMV BLD AUTO: 10.6 FL (ref 8.9–12.7)
POTASSIUM SERPL-SCNC: 3.2 MMOL/L (ref 3.5–5.3)
PR INTERVAL: 214 MS
PROT SERPL-MCNC: 7.5 G/DL (ref 6.4–8.4)
PROT UR STRIP-MCNC: NEGATIVE MG/DL
QRS AXIS: -31 DEGREES
QRSD INTERVAL: 106 MS
QT INTERVAL: 394 MS
QTC INTERVAL: 443 MS
RBC # BLD AUTO: 5.15 MILLION/UL (ref 3.81–5.12)
RSV RNA RESP QL NAA+PROBE: NEGATIVE
SARS-COV-2 RNA RESP QL NAA+PROBE: NEGATIVE
SODIUM SERPL-SCNC: 140 MMOL/L (ref 135–147)
SP GR UR STRIP.AUTO: 1 (ref 1–1.03)
T WAVE AXIS: 114 DEGREES
UROBILINOGEN UR STRIP-ACNC: <2 MG/DL
VENTRICULAR RATE: 76 BPM
WBC # BLD AUTO: 3.75 THOUSAND/UL (ref 4.31–10.16)

## 2024-05-28 PROCEDURE — 96365 THER/PROPH/DIAG IV INF INIT: CPT

## 2024-05-28 PROCEDURE — 93005 ELECTROCARDIOGRAM TRACING: CPT

## 2024-05-28 PROCEDURE — 0241U HB NFCT DS VIR RESP RNA 4 TRGT: CPT

## 2024-05-28 PROCEDURE — 85025 COMPLETE CBC W/AUTO DIFF WBC: CPT | Performed by: EMERGENCY MEDICINE

## 2024-05-28 PROCEDURE — 99285 EMERGENCY DEPT VISIT HI MDM: CPT

## 2024-05-28 PROCEDURE — 83735 ASSAY OF MAGNESIUM: CPT

## 2024-05-28 PROCEDURE — 93010 ELECTROCARDIOGRAM REPORT: CPT | Performed by: INTERNAL MEDICINE

## 2024-05-28 PROCEDURE — 81003 URINALYSIS AUTO W/O SCOPE: CPT | Performed by: EMERGENCY MEDICINE

## 2024-05-28 PROCEDURE — 81025 URINE PREGNANCY TEST: CPT

## 2024-05-28 PROCEDURE — 84484 ASSAY OF TROPONIN QUANT: CPT | Performed by: EMERGENCY MEDICINE

## 2024-05-28 PROCEDURE — 80053 COMPREHEN METABOLIC PANEL: CPT | Performed by: EMERGENCY MEDICINE

## 2024-05-28 PROCEDURE — 96375 TX/PRO/DX INJ NEW DRUG ADDON: CPT

## 2024-05-28 PROCEDURE — 71045 X-RAY EXAM CHEST 1 VIEW: CPT

## 2024-05-28 PROCEDURE — 99285 EMERGENCY DEPT VISIT HI MDM: CPT | Performed by: EMERGENCY MEDICINE

## 2024-05-28 PROCEDURE — 36415 COLL VENOUS BLD VENIPUNCTURE: CPT

## 2024-05-28 RX ORDER — KETOROLAC TROMETHAMINE 30 MG/ML
15 INJECTION, SOLUTION INTRAMUSCULAR; INTRAVENOUS ONCE
Status: COMPLETED | OUTPATIENT
Start: 2024-05-28 | End: 2024-05-28

## 2024-05-28 RX ORDER — ACETAMINOPHEN 10 MG/ML
1000 INJECTION, SOLUTION INTRAVENOUS ONCE
Status: COMPLETED | OUTPATIENT
Start: 2024-05-28 | End: 2024-05-28

## 2024-05-28 RX ADMIN — KETOROLAC TROMETHAMINE 15 MG: 30 INJECTION, SOLUTION INTRAMUSCULAR; INTRAVENOUS at 07:23

## 2024-05-28 RX ADMIN — SODIUM CHLORIDE 1000 ML: 0.9 INJECTION, SOLUTION INTRAVENOUS at 05:29

## 2024-05-28 RX ADMIN — ACETAMINOPHEN 1000 MG: 10 INJECTION INTRAVENOUS at 05:38

## 2024-05-28 NOTE — Clinical Note
Marilyn Delong was seen and treated in our emergency department on 5/28/2024.                Diagnosis:     Viola  .    She may return on this date: 05/30/2024    Marilyn Delong was seen in the ED on 05/28/24. Please excuse her from work for today and tomorrow as she gets better       If you have any questions or concerns, please don't hesitate to call.      Mayito Woodard MD    ______________________________           _______________          _______________  Hospital Representative                              Date                                Time

## 2024-05-28 NOTE — ED PROVIDER NOTES
History  Chief Complaint   Patient presents with    Chest Pain     Mid sternal chest pain that radiates up right side of neck and in to the base of her head. Also reports low back pain. Ongoing x 2 days     48-year-old female history of chronic anemia presenting with myalgias.  Patient states over the past few days has been having pain into her chest, neck, head.  Feeling a bit worn out.  Pain is much worse when laying on the right side, feels muscle cramping in the back of her neck.  No shortness of breath.  Some nausea without vomiting.  No dysuria or hematuria.  Has not had the symptoms before.  Has not tried any medications to make this feel better.  No history of blood clots.        Prior to Admission Medications   Prescriptions Last Dose Informant Patient Reported? Taking?   Ascorbic Acid (vitamin C) 1000 MG tablet  Self No No   Sig: Take 1 tablet (1,000 mg total) by mouth daily   Patient not taking: Reported on 8/4/2023   Blood Pressure Monitoring (Blood Pressure Monitor Automat) ENOC   No No   Sig: Use 2 (two) times a day   Patient not taking: Reported on 2/29/2024   aspirin 81 mg chewable tablet   No No   Sig: CHEW AND SWALLOW 1 tablet once daily   atorvastatin (LIPITOR) 40 mg tablet   No No   Sig: Take 1 tablet (40 mg total) by mouth every evening   Patient not taking: Reported on 11/30/2023   ferrous sulfate 324 (65 Fe) mg   No No   Sig: Take 1 tablet (324 mg total) by mouth in the morning for 360 doses   methocarbamol (ROBAXIN) 500 mg tablet  Self No No   Sig: Take 1 tablet (500 mg total) by mouth daily at bedtime as needed for muscle spasms   Patient not taking: Reported on 6/22/2023   metoprolol succinate (TOPROL-XL) 50 mg 24 hr tablet   No No   Sig: take 1 tablet by mouth twice a day   spironolactone (ALDACTONE) 50 mg tablet   No No   Sig: Take 1 tablet (50 mg total) by mouth daily   tiZANidine (ZANAFLEX) 2 mg tablet   No No   Sig: Take 1 tablet (2 mg total) by mouth daily at bedtime   Patient not  taking: Reported on 2023      Facility-Administered Medications: None       Past Medical History:   Diagnosis Date    Allergic conjunctivitis     last assessed 5/14/15    Chest wall contusion     last assessed 16    Cyst of right breast     last assesed 10/12/15    Fibroadenoma of right breast     last assessed 16    Hypertension     last assessed 16    Hypertension     Iron deficiency     Syphilis        Past Surgical History:   Procedure Laterality Date    BREAST BIOPSY Right     2-3 yrs ago     SECTION      TUBAL LIGATION         Family History   Problem Relation Age of Onset    Hypertension Father      I have reviewed and agree with the history as documented.    E-Cigarette/Vaping    E-Cigarette Use Never User      E-Cigarette/Vaping Substances    Nicotine No     THC No     CBD No     Flavoring No     Other No      Social History     Tobacco Use    Smoking status: Never    Smokeless tobacco: Never   Vaping Use    Vaping status: Never Used   Substance Use Topics    Alcohol use: No    Drug use: No        Review of Systems   Constitutional:  Positive for fatigue. Negative for activity change, fever and unexpected weight change.   Respiratory:  Negative for cough, chest tightness and shortness of breath.    Cardiovascular:  Positive for chest pain. Negative for palpitations and leg swelling.   Gastrointestinal:  Negative for abdominal pain, diarrhea, nausea and vomiting.   Genitourinary:  Negative for dysuria and hematuria.   Skin:  Negative for wound.   Allergic/Immunologic: Negative for immunocompromised state.   Neurological:  Positive for headaches. Negative for syncope.   All other systems reviewed and are negative.      Physical Exam  ED Triage Vitals   Temperature Pulse Respirations Blood Pressure SpO2   24 0426 24 0429 24 0429 24 0429 24 0429   98.2 °F (36.8 °C) 74 16 (!) 201/127 98 %      Temp Source Heart Rate Source Patient Position -  Orthostatic VS BP Location FiO2 (%)   05/28/24 0426 05/28/24 0500 05/28/24 0429 05/28/24 0429 --   Oral Monitor Sitting Right arm       Pain Score       05/28/24 0429       9             Orthostatic Vital Signs  Vitals:    05/28/24 0530 05/28/24 0600 05/28/24 0630 05/28/24 0725   BP: 167/96 160/89 143/81 (!) 178/101   Pulse: 62 66 57 59   Patient Position - Orthostatic VS:    Lying       Physical Exam  Vitals and nursing note reviewed.   Constitutional:       Appearance: She is not toxic-appearing or diaphoretic.   HENT:      Head: Normocephalic and atraumatic.      Right Ear: External ear normal.      Left Ear: External ear normal.      Nose: Nose normal.      Mouth/Throat:      Mouth: Mucous membranes are moist.      Pharynx: No oropharyngeal exudate or posterior oropharyngeal erythema.      Tonsils: No tonsillar exudate.   Eyes:      General: No scleral icterus.     Extraocular Movements: Extraocular movements intact.      Conjunctiva/sclera: Conjunctivae normal.   Neck:      Meningeal: Brudzinski's sign and Kernig's sign absent.   Cardiovascular:      Rate and Rhythm: Normal rate and regular rhythm.      Pulses: Normal pulses.           Radial pulses are 2+ on the right side.        Dorsalis pedis pulses are 2+ on the right side and 2+ on the left side.      Heart sounds: Normal heart sounds, S1 normal and S2 normal. No murmur heard.  Pulmonary:      Effort: Pulmonary effort is normal. No respiratory distress.      Breath sounds: Normal breath sounds. No stridor. No wheezing.   Abdominal:      Palpations: Abdomen is soft.      Tenderness: There is no abdominal tenderness.   Musculoskeletal:         General: Normal range of motion.      Cervical back: Normal range of motion.      Comments: Muscle soreness and palpable spasms in upper neck and back   Skin:     General: Skin is warm and dry.   Neurological:      General: No focal deficit present.      Mental Status: She is alert and oriented to person, place, and  time.   Psychiatric:         Mood and Affect: Mood normal.         ED Medications  Medications   sodium chloride 0.9 % bolus 1,000 mL (0 mL Intravenous Stopped 5/28/24 0649)   acetaminophen (Ofirmev) injection 1,000 mg (0 mg Intravenous Stopped 5/28/24 0633)   ketorolac (TORADOL) injection 15 mg (15 mg Intravenous Given 5/28/24 0723)       Diagnostic Studies  Results Reviewed       Procedure Component Value Units Date/Time    FLU/RSV/COVID - if FLU/RSV clinically relevant [[id number]]  (Normal) Collected: 05/28/24 0528    Lab Status: Final result Specimen: Nares from Nose Updated: 05/28/24 0650     SARS-CoV-2 Negative     INFLUENZA A PCR Negative     INFLUENZA B PCR Negative     RSV PCR Negative    Narrative:      FOR PEDIATRIC PATIENTS - copy/paste COVID Guidelines URL to browser: [email or web address]    SARS-CoV-2 assay is a Nucleic Acid Amplification assay intended for the  qualitative detection of nucleic acid from SARS-CoV-2 in nasopharyngeal  swabs. Results are for the presumptive identification of SARS-CoV-2 RNA.    Positive results are indicative of infection with SARS-CoV-2, the virus  causing COVID-19, but do not rule out bacterial infection or co-infection  with other viruses. Laboratories within the [place] and its  territories are required to report all positive results to the appropriate  public health authorities. Negative results do not preclude SARS-CoV-2  infection and should not be used as the sole basis for treatment or other  patient management decisions. Negative results must be combined with  clinical observations, patient history, and epidemiological information.  This test has not been FDA cleared or approved.    This test has been authorized by FDA under an Emergency Use Authorization  (EUA). This test is only authorized for the duration of time the  declaration that circumstances exist justifying the authorization of the  emergency  use of an in vitro diagnostic tests for detection of SARS-CoV-2  virus and/or diagnosis of COVID-19 infection under section 564(b)(1) of  the Act, 21 U.S.C. 360bbb-3(b)(1), unless the authorization is terminated  or revoked sooner. The test has been validated but independent review by FDA  and CLIA is pending.    Test performed using [employer] GeneXpert: This RT-PCR assay targets N2,  a region unique to SARS-CoV-2. A conserved region in the E-gene was chosen  for pan-Sarbecovirus detection which includes SARS-CoV-2.    According to CMS-2020-01-R, this platform meets the definition of high-throughput technology.    Magnesium [[id number]]  (Normal) Collected: 05/28/24 0433    Lab Status: Final result Specimen: Blood from Arm, Right Updated: 05/28/24 0614     Magnesium 2.0 mg/dL     POCT pregnancy, urine [[id number]]  (Normal) Resulted: 05/28/24 0537    Lab Status: Final result Updated: 05/28/24 0537     EXT Preg Test, Ur Negative     Control Valid    HS Troponin 0hr (reflex protocol) [[id number]]  (Normal) Collected: 05/28/24 0433    Lab Status: Final result Specimen: Blood from Arm, Right Updated: 05/28/24 0508     hs TnI 0hr 3 ng/L     Comprehensive metabolic panel [[id number]]  (Abnormal) Collected: 05/28/24 0433    Lab Status: Final result Specimen: Blood from Arm, Right Updated: 05/28/24 0502     Sodium 140 mmol/L      Potassium 3.2 mmol/L      Chloride 105 mmol/L      CO2 31 mmol/L      ANION GAP 4 mmol/L      BUN 11 mg/dL      Creatinine 0.77 mg/dL      Glucose 104 mg/dL      Calcium 9.1 mg/dL      AST 19 U/L      ALT 16 U/L      Alkaline Phosphatase 66 U/L      Total Protein 7.5 g/dL      Albumin 4.3 g/dL      Total Bilirubin 0.69 mg/dL      eGFR 92 ml/min/1.73sq m     Narrative:      National Kidney Disease Foundation guidelines for Chronic Kidney Disease (CKD):     Stage 1 with normal or high GFR (GFR > 90 mL/min/1.73 square meters)    Stage 2 Mild CKD (GFR = 60-89 mL/min/1.73 square meters)    Stage 3A Moderate  CKD (GFR = 45-59 mL/min/1.73 square meters)    Stage 3B Moderate CKD (GFR = 30-44 mL/min/1.73 square meters)    Stage 4 Severe CKD (GFR = 15-29 mL/min/1.73 square meters)    Stage 5 End Stage CKD (GFR <15 mL/min/1.73 square meters)  Note: GFR calculation is accurate only with a steady state creatinine    UA w Reflex to Microscopic w Reflex to Culture [[id number]]  (Abnormal) Collected: 05/28/24 0433    Lab Status: Final result Specimen: Urine, Clean Catch Updated: 05/28/24 0455     Color, UA Colorless     Clarity, UA Clear     Specific Gravity, UA 1.001     pH, UA 7.0     Leukocytes, UA Negative     Nitrite, UA Negative     Protein, UA Negative mg/dl      Glucose, UA Negative mg/dl      Ketones, UA Negative mg/dl      Urobilinogen, UA <2.0 mg/dl      Bilirubin, UA Negative     Occult Blood, UA Negative    CBC and differential [[id number]]  (Abnormal) Collected: 05/28/24 0433    Lab Status: Final result Specimen: Blood from Arm, Right Updated: 05/28/24 0451     WBC 3.75 Thousand/uL      RBC 5.15 Million/uL      Hemoglobin 14.2 g/dL      Hematocrit 44.5 %      MCV 86 fL      MCH 27.6 pg      MCHC 31.9 g/dL      RDW 13.1 %      MPV 10.6 fL      Platelets 166 Thousands/uL      nRBC 0 /100 WBCs      Segmented % 26 %      Immature Grans % 0 %      Lymphocytes % 63 %      Monocytes % 8 %      Eosinophils Relative 2 %      Basophils Relative 1 %      Absolute Neutrophils 0.99 Thousands/µL      Absolute Immature Grans 0.00 Thousand/uL      Absolute Lymphocytes 2.37 Thousands/µL      Absolute Monocytes 0.28 Thousand/µL      Eosinophils Absolute 0.09 Thousand/µL      Basophils Absolute 0.02 Thousands/µL                    XR chest 1 view portable   ED Interpretation by Mayito Woodard MD (05/28 0510)   Chest xray independently interpreted by me.  No acute cardiopulmonary disease. No subdiaphragmatic free air. No fracture or dislocation              Procedures  ECG 12 Lead Documentation Only    Date/Time: 5/28/2024 4:30  AM    Performed by: Mayito Woodard MD  Authorized by: Mayito Woodard MD    Indications / Diagnosis:  CP  ECG reviewed by me, the ED Provider: yes    Patient location:  ED  Previous ECG:     Previous ECG:  Compared to current    Comparison ECG info:  4/14/23    Similarity:  No change    Comparison to cardiac monitor: Yes    Interpretation:     Interpretation: normal    Rate:     ECG rate:  76    ECG rate assessment: normal    Rhythm:     Rhythm: sinus rhythm and A-V block    Ectopy:     Ectopy: none    QRS:     QRS axis:  Normal    QRS intervals:  Normal  Conduction:     Conduction: abnormal      Abnormal conduction: 1st degree    ST segments:     ST segments:  Normal  T waves:     T waves: normal          ED Course  ED Course as of 05/28/24 0730   Tue May 28, 2024   0445 Blood Pressure(!): 201/127   0454 WBC(!): 3.75  Chronically low, at baseline   0509 hs TnI 0hr: 3   0509 Potassium(!): 3.2   0509 Blood Pressure: 162/95  W/o intervention   0510 XR chest 1 view portable  Chest xray independently interpreted by me.  No acute cardiopulmonary disease. No subdiaphragmatic free air. No fracture or dislocation       0548 PREGNANCY TEST URINE: Negative             HEART Risk Score      Flowsheet Row Most Recent Value   Heart Score Risk Calculator    History 0 Filed at: 05/28/2024 0510   ECG 0 Filed at: 05/28/2024 0510   Age 1 Filed at: 05/28/2024 0510   Risk Factors 1 Filed at: 05/28/2024 0510   Troponin 0 Filed at: 05/28/2024 0510   HEART Score 2 Filed at: 05/28/2024 0510                PERC Rule for PE      Flowsheet Row Most Recent Value   PERC Rule for PE    Age >=50 0 Filed at: 05/28/2024 0510   HR >=100 0 Filed at: 05/28/2024 0510   O2 Sat on room air < 95% 0 Filed at: 05/28/2024 0510   History of PE or DVT 0 Filed at: 05/28/2024 0510   Recent trauma or surgery 0 Filed at: 05/28/2024 0510   Hemoptysis 0 Filed at: 05/28/2024 0510   Exogenous estrogen 0 Filed at: 05/28/2024 0510   Unilateral leg swelling 0 Filed  at: 05/28/2024 0510   PERC Rule for PE Results 0 Filed at: 05/28/2024 0510                          Medical Decision Making  Initial Impression: Very nonspecific exam.  Evidence of pain in her chest presentation not consistent with ACS.  PERCs out.  With vague complaints could easily be a viral syndrome.  With a lot of cramping could have an electrolyte abnormality.  No signs of meningitis.  Headache not severe, and has been going on for a while, do not suspect SAH.  No history of migraines.    Final Impression: Evaluation here overall nonspecific but reassuring.  Slightly low potassium otherwise lab work was all within normal limits.  No signs of ACS, infection, severe electrolyte abnormality.  Decent improvement with IV Tylenol and IV Toradol.  Discussed results with patient who felt reassured and felt comfortable going home.  Has a PCP who she can follow-up with.  Patient discharged in improved condition.      Problems Addressed:  Acute nonintractable headache, unspecified headache type: complicated acute illness or injury  Hypokalemia: chronic illness or injury    Amount and/or Complexity of Data Reviewed  Labs: ordered. Decision-making details documented in ED Course.  Radiology: ordered and independent interpretation performed. Decision-making details documented in ED Course.  ECG/medicine tests: ordered and independent interpretation performed.    Risk  OTC drugs.  Prescription drug management.  Decision regarding hospitalization.          Disposition  Final diagnoses:   Acute nonintractable headache, unspecified headache type   Hypokalemia     Time reflects when diagnosis was documented in both MDM as applicable and the Disposition within this note       Time User Action Codes Description Comment    5/28/2024  7:07 AM Mayito Woodard Add [R51.9] Acute nonintractable headache, unspecified headache type     5/28/2024  7:07 AM Mayito Woodard Add [E87.6] Hypokalemia           ED Disposition       ED  Disposition   Discharge    Condition   Stable    Date/Time   Tue May 28, 2024 0707    Comment   Marilyn Delong discharge to home/self care.                   Follow-up Information       Follow up With Specialties Details Why Contact Info    Manny Winchester MD Family Medicine Schedule an appointment as soon as possible for a visit  For reevaluation [facility] [address] [facility] [id number]  [phone]              Patient's Medications   Discharge Prescriptions    No medications on file     No discharge procedures on file.    PDMP Review         Value Time User    PDMP Reviewed  Yes 6/17/2023  5:41 PM Jimbo Ramsay MD             ED Provider  Attending physically available and evaluated Marilyn Delong. I managed the patient along with the ED Attending.    Electronically Signed by           Mayito Woodard MD  05/28/24 0712

## 2024-05-28 NOTE — TELEPHONE ENCOUNTER
After 3 tomorrow. 05/29/2024    Marilyn was in ER for chest pain and headaches.  She would like to schedule an apt for a f/u and is due for a physical.  She is not feeling any better and would like to see the doctor tomorrow.  Please call patient back.      Thank you

## 2024-05-30 NOTE — ED ATTENDING ATTESTATION
5/28/2024  IWilian DO, saw and evaluated the patient. I have discussed the patient with the resident/non-physician practitioner and agree with the resident's/non-physician practitioner's findings, Plan of Care, and MDM as documented in the resident's/non-physician practitioner's note, except where noted. All available labs and Radiology studies were reviewed.  I was present for key portions of any procedure(s) performed by the resident/non-physician practitioner and I was immediately available to provide assistance.       At this point I agree with the current assessment done in the Emergency Department.  I have conducted an independent evaluation of this patient a history and physical is as follows:    49 yo F with chest pain, back pain, neck pain over the past few days.    PE:  The patient is well appearing, non-toxic, in NAD. Head: normocephalic, atraumatic. HEENT: mucous membranes moist.  Lungs: CTA b/l, no resp distress. Heart: RRR. No M/R/G. Abdomen: NT, ND, no R/R/G. Neuro: CN2-12 intact, GCS 15. Normal strength and sensation, normal speech and gait. Cap refill < 2 sec, skin warm and dry. No rashes or lesions.    ED workup - negative chest pain/ACS workup, CXR normal, labs WNL except mild hypoK at 3.2. HEART score 2, low risk. PERC negative, Well's low.  Stable for d/c home, likely musculoskeletal chest/back/neck pain.      ED Course         Critical Care Time  Procedures

## 2024-06-03 ENCOUNTER — OFFICE VISIT (OUTPATIENT)
Dept: FAMILY MEDICINE CLINIC | Facility: CLINIC | Age: 49
End: 2024-06-03
Payer: COMMERCIAL

## 2024-06-03 VITALS
DIASTOLIC BLOOD PRESSURE: 98 MMHG | OXYGEN SATURATION: 98 % | HEART RATE: 86 BPM | SYSTOLIC BLOOD PRESSURE: 136 MMHG | WEIGHT: 128 LBS | TEMPERATURE: 97.6 F | BODY MASS INDEX: 25.8 KG/M2 | RESPIRATION RATE: 16 BRPM | HEIGHT: 59 IN

## 2024-06-03 DIAGNOSIS — E87.6 HYPOKALEMIA: ICD-10-CM

## 2024-06-03 DIAGNOSIS — D50.8 IRON DEFICIENCY ANEMIA SECONDARY TO INADEQUATE DIETARY IRON INTAKE: ICD-10-CM

## 2024-06-03 DIAGNOSIS — M54.2 CERVICALGIA: ICD-10-CM

## 2024-06-03 DIAGNOSIS — I10 PRIMARY HYPERTENSION: Primary | ICD-10-CM

## 2024-06-03 PROCEDURE — 99214 OFFICE O/P EST MOD 30 MIN: CPT | Performed by: FAMILY MEDICINE

## 2024-06-03 RX ORDER — TIZANIDINE 2 MG/1
2 TABLET ORAL
Qty: 30 TABLET | Refills: 4 | Status: SHIPPED | OUTPATIENT
Start: 2024-06-03

## 2024-06-03 NOTE — PROGRESS NOTES
Ambulatory Visit  Name: Marilyn Delong      : 1975      MRN: [id number]  Encounter Provider: Manny Winchester MD  Encounter Date: 6/3/2024   Encounter department: [facility]    Assessment & Plan   1. Primary hypertension  -     TSH + Free T4; Future  2. Iron deficiency anemia secondary to inadequate dietary iron intake  -     Iron Panel (Includes Ferritin, Iron Sat%, Iron, and TIBC); Future  3. Hypokalemia  -     Comprehensive metabolic panel; Future  4. Cervicalgia  -     tiZANidine (ZANAFLEX) 2 mg tablet; Take 1 tablet (2 mg total) by mouth daily at bedtime    Plan to trial her back on muscle relaxers for neck pain.  Not interested in physical therapy at this time.  If no improvement with muscle relaxer she will consider physical therapy.  Repeat lab work listed above.  Continue blood pressure medications.  Follow-up for annual physical.     History of Present Illness     Patient presents with:  Follow-up: Acute nonintractable headache, unspecified headache type    Labs were done in ER     She was seen in the emergency department for acute intractable headache and chest pain.  Workup was unremarkable outside of a low potassium of 3.2.  She was discharged home.  She previously saw cardiology for high blood pressure and hypertrophic cardiomyopathy.    Left ventricular hypertrophy likely secondary to high blood pressure.  Blood pressure is currently well-controlled on metoprolol and spironolactone.  She is not currently taking any iron.    Review of Systems   Constitutional:  Negative for activity change, fatigue and fever.   Eyes:  Negative for photophobia, pain, discharge, redness and visual disturbance.   Respiratory:  Negative for shortness of breath.    Cardiovascular:  Negative for chest pain.   Gastrointestinal:  Negative for abdominal pain, constipation, diarrhea and nausea.   Endocrine: Negative for cold intolerance and heat intolerance.   Musculoskeletal:  Positive for neck  pain. Negative for back pain.   Skin:  Negative for rash.   Neurological:  Negative for weakness, light-headedness, numbness and headaches.   Psychiatric/Behavioral:  Negative for confusion.      Medical History Reviewed by provider this encounter:  Tobacco  Allergies  Meds  Problems  Med Hx  Surg Hx  Fam Hx       Current Outpatient Medications on File Prior to Visit   Medication Sig Dispense Refill    aspirin 81 mg chewable tablet CHEW AND SWALLOW 1 tablet once daily 30 tablet 5    metoprolol succinate (TOPROL-XL) 50 mg 24 hr tablet take 1 tablet by mouth twice a day 60 tablet 5    spironolactone (ALDACTONE) 50 mg tablet Take 1 tablet (50 mg total) by mouth daily 90 tablet 1    Ascorbic Acid (vitamin C) 1000 MG tablet Take 1 tablet (1,000 mg total) by mouth daily (Patient not taking: Reported on 8/4/2023) 30 tablet 2    Blood Pressure Monitoring (Blood Pressure Monitor Automat) ENOC Use 2 (two) times a day (Patient not taking: Reported on 2/29/2024) 1 each 0    ferrous sulfate 324 (65 Fe) mg Take 1 tablet (324 mg total) by mouth in the morning for 360 doses (Patient not taking: Reported on 6/3/2024) 90 tablet 3    [DISCONTINUED] atorvastatin (LIPITOR) 40 mg tablet Take 1 tablet (40 mg total) by mouth every evening (Patient not taking: Reported on 11/30/2023) 30 tablet 4    [DISCONTINUED] methocarbamol (ROBAXIN) 500 mg tablet Take 1 tablet (500 mg total) by mouth daily at bedtime as needed for muscle spasms (Patient not taking: Reported on 6/22/2023) 30 tablet 0    [DISCONTINUED] tiZANidine (ZANAFLEX) 2 mg tablet Take 1 tablet (2 mg total) by mouth daily at bedtime (Patient not taking: Reported on 11/30/2023) 30 tablet 4     No current facility-administered medications on file prior to visit.      Social History     Tobacco Use    Smoking status: Never    Smokeless tobacco: Never   Vaping Use    Vaping status: Never Used   Substance and Sexual Activity    Alcohol use: No    Drug use: No    Sexual activity:  "Not Currently     Partners: Male     Birth control/protection: Pill, Injection     Objective     /98 (BP Location: Left arm, Patient Position: Sitting, Cuff Size: Large)   Pulse 86   Temp 97.6 °F (36.4 °C) (Temporal)   Resp 16   Ht 4' 11\" (1.499 m)   Wt 58.1 kg (128 lb)   SpO2 98%   BMI 25.85 kg/m²     Physical Exam  Vitals and nursing note reviewed.   Constitutional:       Appearance: Normal appearance. She is well-developed.   HENT:      Head: Normocephalic and atraumatic.   Eyes:      Extraocular Movements: Extraocular movements intact.      Pupils: Pupils are equal, round, and reactive to light.   Cardiovascular:      Rate and Rhythm: Normal rate and regular rhythm.   Pulmonary:      Effort: Pulmonary effort is normal.      Breath sounds: Normal breath sounds.   Abdominal:      General: Bowel sounds are normal.      Palpations: Abdomen is soft.   Musculoskeletal:         General: Tenderness (neck) present.      Cervical back: Normal range of motion.   Skin:     General: Skin is warm and dry.   Neurological:      General: No focal deficit present.      Mental Status: She is alert and oriented to person, place, and time.   Psychiatric:         Mood and Affect: Mood normal.         Speech: Speech normal.         Behavior: Behavior normal.       Administrative Statements     "

## 2024-06-07 ENCOUNTER — APPOINTMENT (OUTPATIENT)
Dept: LAB | Age: 49
End: 2024-06-07
Payer: COMMERCIAL

## 2024-06-07 DIAGNOSIS — D50.8 IRON DEFICIENCY ANEMIA SECONDARY TO INADEQUATE DIETARY IRON INTAKE: ICD-10-CM

## 2024-06-07 DIAGNOSIS — E87.6 HYPOKALEMIA: ICD-10-CM

## 2024-06-07 DIAGNOSIS — I10 PRIMARY HYPERTENSION: ICD-10-CM

## 2024-06-07 LAB
ALBUMIN SERPL BCP-MCNC: 4.1 G/DL (ref 3.5–5)
ALP SERPL-CCNC: 64 U/L (ref 34–104)
ALT SERPL W P-5'-P-CCNC: 17 U/L (ref 7–52)
ANION GAP SERPL CALCULATED.3IONS-SCNC: 7 MMOL/L (ref 4–13)
AST SERPL W P-5'-P-CCNC: 19 U/L (ref 13–39)
BASOPHILS # BLD AUTO: 0.01 THOUSANDS/ÂΜL (ref 0–0.1)
BASOPHILS NFR BLD AUTO: 0 % (ref 0–1)
BILIRUB SERPL-MCNC: 0.57 MG/DL (ref 0.2–1)
BUN SERPL-MCNC: 13 MG/DL (ref 5–25)
CALCIUM SERPL-MCNC: 9.3 MG/DL (ref 8.4–10.2)
CHLORIDE SERPL-SCNC: 103 MMOL/L (ref 96–108)
CO2 SERPL-SCNC: 31 MMOL/L (ref 21–32)
CREAT SERPL-MCNC: 0.79 MG/DL (ref 0.6–1.3)
EOSINOPHIL # BLD AUTO: 0.05 THOUSAND/ÂΜL (ref 0–0.61)
EOSINOPHIL NFR BLD AUTO: 2 % (ref 0–6)
ERYTHROCYTE [DISTWIDTH] IN BLOOD BY AUTOMATED COUNT: 12.8 % (ref 11.6–15.1)
FERRITIN SERPL-MCNC: 51 NG/ML (ref 11–307)
GFR SERPL CREATININE-BSD FRML MDRD: 89 ML/MIN/1.73SQ M
GLUCOSE P FAST SERPL-MCNC: 77 MG/DL (ref 65–99)
HCT VFR BLD AUTO: 45.4 % (ref 34.8–46.1)
HGB BLD-MCNC: 14.8 G/DL (ref 11.5–15.4)
IMM GRANULOCYTES # BLD AUTO: 0 THOUSAND/UL (ref 0–0.2)
IMM GRANULOCYTES NFR BLD AUTO: 0 % (ref 0–2)
IRON SATN MFR SERPL: 20 % (ref 15–50)
IRON SERPL-MCNC: 60 UG/DL (ref 50–212)
LYMPHOCYTES # BLD AUTO: 1.71 THOUSANDS/ÂΜL (ref 0.6–4.47)
LYMPHOCYTES NFR BLD AUTO: 54 % (ref 14–44)
MCH RBC QN AUTO: 27.9 PG (ref 26.8–34.3)
MCHC RBC AUTO-ENTMCNC: 32.6 G/DL (ref 31.4–37.4)
MCV RBC AUTO: 86 FL (ref 82–98)
MONOCYTES # BLD AUTO: 0.24 THOUSAND/ÂΜL (ref 0.17–1.22)
MONOCYTES NFR BLD AUTO: 8 % (ref 4–12)
NEUTROPHILS # BLD AUTO: 1.11 THOUSANDS/ÂΜL (ref 1.85–7.62)
NEUTS SEG NFR BLD AUTO: 36 % (ref 43–75)
NRBC BLD AUTO-RTO: 0 /100 WBCS
PLATELET # BLD AUTO: 182 THOUSANDS/UL (ref 149–390)
PMV BLD AUTO: 11.8 FL (ref 8.9–12.7)
POTASSIUM SERPL-SCNC: 3.7 MMOL/L (ref 3.5–5.3)
PROT SERPL-MCNC: 7 G/DL (ref 6.4–8.4)
RBC # BLD AUTO: 5.3 MILLION/UL (ref 3.81–5.12)
SODIUM SERPL-SCNC: 141 MMOL/L (ref 135–147)
T4 FREE SERPL-MCNC: 0.84 NG/DL (ref 0.61–1.12)
TIBC SERPL-MCNC: 303 UG/DL (ref 250–450)
TSH SERPL DL<=0.05 MIU/L-ACNC: 1.24 UIU/ML (ref 0.45–4.5)
UIBC SERPL-MCNC: 243 UG/DL (ref 155–355)
WBC # BLD AUTO: 3.12 THOUSAND/UL (ref 4.31–10.16)

## 2024-06-07 PROCEDURE — 83550 IRON BINDING TEST: CPT

## 2024-06-07 PROCEDURE — 84439 ASSAY OF FREE THYROXINE: CPT

## 2024-06-07 PROCEDURE — 84443 ASSAY THYROID STIM HORMONE: CPT

## 2024-06-07 PROCEDURE — 83540 ASSAY OF IRON: CPT

## 2024-06-07 PROCEDURE — 80053 COMPREHEN METABOLIC PANEL: CPT

## 2024-06-07 PROCEDURE — 82728 ASSAY OF FERRITIN: CPT

## 2024-06-07 PROCEDURE — 36415 COLL VENOUS BLD VENIPUNCTURE: CPT

## 2024-07-19 ENCOUNTER — OFFICE VISIT (OUTPATIENT)
Dept: FAMILY MEDICINE CLINIC | Facility: CLINIC | Age: 49
End: 2024-07-19
Payer: COMMERCIAL

## 2024-07-19 VITALS
OXYGEN SATURATION: 99 % | WEIGHT: 123.5 LBS | BODY MASS INDEX: 24.9 KG/M2 | SYSTOLIC BLOOD PRESSURE: 154 MMHG | DIASTOLIC BLOOD PRESSURE: 90 MMHG | HEIGHT: 59 IN | HEART RATE: 87 BPM | TEMPERATURE: 97.9 F | RESPIRATION RATE: 16 BRPM

## 2024-07-19 DIAGNOSIS — E61.1 IRON DEFICIENCY: ICD-10-CM

## 2024-07-19 DIAGNOSIS — I1A.0 RESISTANT HYPERTENSION: Primary | ICD-10-CM

## 2024-07-19 PROCEDURE — 99214 OFFICE O/P EST MOD 30 MIN: CPT | Performed by: FAMILY MEDICINE

## 2024-07-19 RX ORDER — HYDROCHLOROTHIAZIDE 25 MG/1
25 TABLET ORAL DAILY
Qty: 30 TABLET | Refills: 5 | Status: SHIPPED | OUTPATIENT
Start: 2024-07-19

## 2024-07-19 NOTE — ASSESSMENT & PLAN NOTE
BP Readings from Last 3 Encounters:   07/19/24 154/90   06/03/24 136/98   05/28/24 (!) 178/101     Tolerated spironolactone previously. No

## 2024-07-19 NOTE — PROGRESS NOTES
"Ambulatory Visit  Name: Marilyn Delong      : 1975      MRN: [id number]  Encounter Provider: Manny Winchester MD  Encounter Date: 2024   Encounter department: [facility]    Assessment & Plan   1. Resistant hypertension  Assessment & Plan:  BP Readings from Last 3 Encounters:   24 154/90   24 136/98   24 (!) 178/101     Tolerated spironolactone previously. No   Orders:  -     Ambulatory Referral to Cardiology; Future  -     Basic metabolic panel; Future  -     Magnesium; Future  -     CBC and differential; Future  -     hydroCHLOROthiazide 25 mg tablet; Take 1 tablet (25 mg total) by mouth daily  2. Iron deficiency  -     Iron Panel (Includes Ferritin, Iron Sat%, Iron, and TIBC); Future     No longer taking oral iron. Suspect leg cramping and pruritus is secondary to iron deficiency. Repeat labs. Stop spironolactone and start HCTZ. Patient has had trouble with multiple medications. Referral to cardiology         History of Present Illness     Patient presents with:  Follow-up: 6 weeks     Patient discontinued spironolactone because she felt this was causing itching like cramping and loose skin in her neck area.  She has had multiple allergies to blood pressure medications.  She does have a history of iron deficiency anemia but no longer taking oral iron      Review of Systems   Constitutional:  Negative for activity change, fatigue and fever.   Eyes:  Negative for visual disturbance.   Respiratory:  Negative for shortness of breath.    Cardiovascular:  Negative for chest pain.   Gastrointestinal:  Negative for abdominal pain, constipation, diarrhea and nausea.   Endocrine: Negative for cold intolerance and heat intolerance.   Musculoskeletal:  Negative for back pain.        Leg cramping    Skin:  Negative for rash.        Itching   \"Loose skin neck\"   Neurological:  Negative for headaches.   Psychiatric/Behavioral:  Negative for confusion.      Past Medical History: "   Diagnosis Date    Allergic conjunctivitis     last assessed 5/14/15    Chest wall contusion     last assessed 16    Cyst of right breast     last assesed 10/12/15    Fibroadenoma of right breast     last assessed 16    Hypertension     last assessed 16    Hypertension     Iron deficiency     Syphilis      Past Surgical History:   Procedure Laterality Date    BREAST BIOPSY Right     2-3 yrs ago     SECTION      TUBAL LIGATION       Family History   Problem Relation Age of Onset    Hypertension Father      Social History     Tobacco Use    Smoking status: Never    Smokeless tobacco: Never   Vaping Use    Vaping status: Never Used   Substance and Sexual Activity    Alcohol use: No    Drug use: No    Sexual activity: Not Currently     Partners: Male     Birth control/protection: Pill, Injection     Current Outpatient Medications on File Prior to Visit   Medication Sig    aspirin 81 mg chewable tablet CHEW AND SWALLOW 1 tablet once daily    metoprolol succinate (TOPROL-XL) 50 mg 24 hr tablet take 1 tablet by mouth twice a day    spironolactone (ALDACTONE) 50 mg tablet Take 1 tablet (50 mg total) by mouth daily    tiZANidine (ZANAFLEX) 2 mg tablet Take 1 tablet (2 mg total) by mouth daily at bedtime (Patient taking differently: Take 2 mg by mouth if needed)    Ascorbic Acid (vitamin C) 1000 MG tablet Take 1 tablet (1,000 mg total) by mouth daily (Patient not taking: Reported on 2023)    Blood Pressure Monitoring (Blood Pressure Monitor Automat) ENOC Use 2 (two) times a day (Patient not taking: Reported on 2024)    ferrous sulfate 324 (65 Fe) mg Take 1 tablet (324 mg total) by mouth in the morning for 360 doses (Patient not taking: Reported on 6/3/2024)     Allergies   Allergen Reactions    Amlodipine Edema    Atorvastatin Myalgia    Losartan Palpitations     Immunization History   Administered Date(s) Administered    COVID-19 PFIZER VACCINE 0.3 ML IM 2021, 2021     "INFLUENZA 10/04/2018, 10/10/2019, 10/10/2023    Influenza, injectable, quadrivalent, preservative free 0.5 mL 11/10/2021    Influenza, seasonal, injectable 10/01/2015     Objective     /90 (BP Location: Left arm, Patient Position: Sitting, Cuff Size: Large)   Pulse 87   Temp 97.9 °F (36.6 °C)   Resp 16   Ht 4' 11\" (1.499 m)   Wt 56 kg (123 lb 8 oz)   SpO2 99%   BMI 24.94 kg/m²     Physical Exam  Vitals and nursing note reviewed.   Constitutional:       Appearance: Normal appearance. She is well-developed.   HENT:      Head: Normocephalic and atraumatic.   Cardiovascular:      Rate and Rhythm: Normal rate and regular rhythm.   Pulmonary:      Effort: Pulmonary effort is normal.      Breath sounds: Normal breath sounds.   Abdominal:      General: Bowel sounds are normal.      Palpations: Abdomen is soft.   Musculoskeletal:      Cervical back: Normal range of motion.   Skin:     General: Skin is warm.   Neurological:      General: No focal deficit present.      Mental Status: She is alert.   Psychiatric:         Mood and Affect: Mood normal.         Speech: Speech normal.         "

## 2024-07-20 ENCOUNTER — APPOINTMENT (OUTPATIENT)
Dept: LAB | Age: 49
End: 2024-07-20
Payer: COMMERCIAL

## 2024-07-20 DIAGNOSIS — I51.7 LVH (LEFT VENTRICULAR HYPERTROPHY): ICD-10-CM

## 2024-07-20 DIAGNOSIS — E61.1 IRON DEFICIENCY: ICD-10-CM

## 2024-07-20 DIAGNOSIS — I1A.0 RESISTANT HYPERTENSION: ICD-10-CM

## 2024-07-20 LAB
ANION GAP SERPL CALCULATED.3IONS-SCNC: 9 MMOL/L (ref 4–13)
BASOPHILS # BLD AUTO: 0.02 THOUSANDS/ÂΜL (ref 0–0.1)
BASOPHILS NFR BLD AUTO: 1 % (ref 0–1)
BUN SERPL-MCNC: 9 MG/DL (ref 5–25)
CALCIUM SERPL-MCNC: 9.4 MG/DL (ref 8.4–10.2)
CHLORIDE SERPL-SCNC: 105 MMOL/L (ref 96–108)
CO2 SERPL-SCNC: 29 MMOL/L (ref 21–32)
CREAT SERPL-MCNC: 0.75 MG/DL (ref 0.6–1.3)
EOSINOPHIL # BLD AUTO: 0.03 THOUSAND/ÂΜL (ref 0–0.61)
EOSINOPHIL NFR BLD AUTO: 1 % (ref 0–6)
ERYTHROCYTE [DISTWIDTH] IN BLOOD BY AUTOMATED COUNT: 12.6 % (ref 11.6–15.1)
FERRITIN SERPL-MCNC: 48 NG/ML (ref 11–307)
GFR SERPL CREATININE-BSD FRML MDRD: 95 ML/MIN/1.73SQ M
GLUCOSE P FAST SERPL-MCNC: 77 MG/DL (ref 65–99)
HCT VFR BLD AUTO: 44.1 % (ref 34.8–46.1)
HGB BLD-MCNC: 14.3 G/DL (ref 11.5–15.4)
IMM GRANULOCYTES # BLD AUTO: 0 THOUSAND/UL (ref 0–0.2)
IMM GRANULOCYTES NFR BLD AUTO: 0 % (ref 0–2)
IRON SATN MFR SERPL: 24 % (ref 15–50)
IRON SERPL-MCNC: 79 UG/DL (ref 50–212)
LYMPHOCYTES # BLD AUTO: 1.57 THOUSANDS/ÂΜL (ref 0.6–4.47)
LYMPHOCYTES NFR BLD AUTO: 53 % (ref 14–44)
MAGNESIUM SERPL-MCNC: 2.1 MG/DL (ref 1.9–2.7)
MCH RBC QN AUTO: 27.7 PG (ref 26.8–34.3)
MCHC RBC AUTO-ENTMCNC: 32.4 G/DL (ref 31.4–37.4)
MCV RBC AUTO: 86 FL (ref 82–98)
MONOCYTES # BLD AUTO: 0.25 THOUSAND/ÂΜL (ref 0.17–1.22)
MONOCYTES NFR BLD AUTO: 9 % (ref 4–12)
NEUTROPHILS # BLD AUTO: 1.07 THOUSANDS/ÂΜL (ref 1.85–7.62)
NEUTS SEG NFR BLD AUTO: 36 % (ref 43–75)
NRBC BLD AUTO-RTO: 0 /100 WBCS
PLATELET # BLD AUTO: 199 THOUSANDS/UL (ref 149–390)
PMV BLD AUTO: 10.9 FL (ref 8.9–12.7)
POTASSIUM SERPL-SCNC: 3.6 MMOL/L (ref 3.5–5.3)
RBC # BLD AUTO: 5.16 MILLION/UL (ref 3.81–5.12)
SODIUM SERPL-SCNC: 143 MMOL/L (ref 135–147)
TIBC SERPL-MCNC: 323 UG/DL (ref 250–450)
UIBC SERPL-MCNC: 244 UG/DL (ref 155–355)
WBC # BLD AUTO: 2.94 THOUSAND/UL (ref 4.31–10.16)

## 2024-07-20 PROCEDURE — 83550 IRON BINDING TEST: CPT

## 2024-07-20 PROCEDURE — 82728 ASSAY OF FERRITIN: CPT

## 2024-07-20 PROCEDURE — 85025 COMPLETE CBC W/AUTO DIFF WBC: CPT

## 2024-07-20 PROCEDURE — 83735 ASSAY OF MAGNESIUM: CPT

## 2024-07-20 PROCEDURE — 83540 ASSAY OF IRON: CPT

## 2024-07-20 PROCEDURE — 36415 COLL VENOUS BLD VENIPUNCTURE: CPT

## 2024-07-20 PROCEDURE — 80048 BASIC METABOLIC PNL TOTAL CA: CPT

## 2024-07-22 ENCOUNTER — TELEPHONE (OUTPATIENT)
Dept: FAMILY MEDICINE CLINIC | Facility: CLINIC | Age: 49
End: 2024-07-22

## 2024-07-22 DIAGNOSIS — D70.8 OTHER NEUTROPENIA (HCC): Primary | ICD-10-CM

## 2024-07-22 NOTE — TELEPHONE ENCOUNTER
----- Message from Manny Winchester MD sent at 7/20/2024  2:55 PM EDT -----  Please call patient with results.  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  Can consider follow-up with hematology and if she has seen hematology in the past and workup was unremarkable: They felt this was benign neutropenia.

## 2024-07-22 NOTE — TELEPHONE ENCOUNTER
Pt returned call.  She had not seen message sent via [employer].  Read message to her.  She asked to have Dr Winchester's message repeated.  Relayed Dr Winchester's result message.  Pt had questions, so warm transferred call to Nurse Mitchell in Triage

## 2024-07-22 NOTE — TELEPHONE ENCOUNTER
Called patient no response left message letting her know I was calling to give her information regarding the referral for hematology but will send information to [facility].

## 2024-07-22 NOTE — TELEPHONE ENCOUNTER
Relayed message to patient.     She would like to maybe see Hematology once again, please place referral

## 2024-07-24 ENCOUNTER — OFFICE VISIT (OUTPATIENT)
Dept: HEMATOLOGY ONCOLOGY | Facility: CLINIC | Age: 49
End: 2024-07-24
Payer: COMMERCIAL

## 2024-07-24 VITALS
SYSTOLIC BLOOD PRESSURE: 160 MMHG | WEIGHT: 122 LBS | RESPIRATION RATE: 16 BRPM | DIASTOLIC BLOOD PRESSURE: 104 MMHG | OXYGEN SATURATION: 98 % | HEART RATE: 95 BPM | TEMPERATURE: 96.8 F | BODY MASS INDEX: 24.6 KG/M2 | HEIGHT: 59 IN

## 2024-07-24 DIAGNOSIS — I10 PRIMARY HYPERTENSION: ICD-10-CM

## 2024-07-24 DIAGNOSIS — D70.8 OTHER NEUTROPENIA (HCC): ICD-10-CM

## 2024-07-24 PROCEDURE — 99204 OFFICE O/P NEW MOD 45 MIN: CPT | Performed by: PHYSICIAN ASSISTANT

## 2024-07-24 RX ORDER — METOPROLOL SUCCINATE 50 MG/1
50 TABLET, EXTENDED RELEASE ORAL 2 TIMES DAILY
Qty: 60 TABLET | Refills: 5 | Status: SHIPPED | OUTPATIENT
Start: 2024-07-24

## 2024-07-24 NOTE — TELEPHONE ENCOUNTER
Pt took the hctz today and it made her dizzy.  Plus she was reading the insert and it says to not use if you have potassium problems because it can deplete your potassium and she says she already has issue with her potassium, she is afraid to continue this medication.  Please advise.  (I di give patient contact info for cardiology to call and make appt).    THIS MESSAGE IS NOT RELATED TO REFILL REQUEST FOR METOPROLOL.  SYSTEM WOULD NOT LET ME MAKE A SEPARATE ENCOUNTER.

## 2024-07-24 NOTE — PROGRESS NOTES
"Hematology/Oncology Outpatient Consult  Marilyn Delong 48 y.o. 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  Her neutrophils are not any significantly changed since her last workup therefore a repeat workup is not warranted    Light chains serum   Cardiology ordered these.  With a negative UPEP and SPEP a repeat is not recommended.  Serum immunoglobulins are normal  The slight elevation in kappa light chain as well as ratio likely is not clinically significant as it is very mild in elevation and no monoclonal protein is present    Cardiology has reordered a repeat light chains.      Should these be significantly worse, the last step would be a bone marrow biopsy though I do not think that anything would be found    Patient is recommended follow-up with cardiology as she does not have a scheduled appointment and it is due for follow-up next month.    Follow-up as needed    HPI:  48-year-old female presents for new patient consult.  She was also seen in consultation here for the same reason back in 2020.      HPI from previous provider here as below:    \" female from [facility] seen for initial consultation 5/29/2020 at the referral of Fransisca Erazo MD regarding chronic neutropenia since at least 6/2015.  She moved [place] from [facility] in 2013.  She does not recall being told she had neutropenia when she was living in [place].     6/30/2015:  Hemoglobin 14.8, MCV of 81, white blood cell count 3.95 with 35% neutrophils, 54% lymphocytes, ANC 1.38, platelet count 209     8/23/2017 hemoglobin 14.1, MCV of 82, white blood cell count 3.44 with 30% neutrophils, 60% lymphocytes, ANC 1.03, platelet count 179     10/2017:  CT A/P - liver " "and spleen unremarkable.       2/20/2018 hemoglobin 14.7, MCV 82, white blood cell count 4.45 with 37% neutrophils, 52% lymphocytes, ANC 1.65, platelet count 192     8/7/2019 hemoglobin 13.4, MCV of 82, white blood cell count 3.89 with 39% neutrophils, 51% lymphocytes, ANC 1.52, platelet count 189     5/11/2020 hemoglobin 13.9, MCV of 83, white blood cell count 3.63 with 44% neutrophils, 46% lymphocytes, ANC 1.6, platelet count 179  Normal rheumatoid factor, magnesium, TSH, CCP, Lyme testing.  Vitamin B12 normal at 671.  Ferritin low at 13, iron saturation 12%.     She reports that she is not prone to infection, denies any menorrhagia.  Denies any fevers, chills, sweats.  She states she has lost approximately 20 pounds, unintentionally over the past few months.  She denies any HIV risk factors such as having had blood transfusion, tattoos, unprotected sexual relations.  She does have intermittent anterior thigh discomfort as well as which she describes as Raynaud phenomena of her toes     Additional workup was negative for Lyme disease, normal vitamin B12, CRP, flow cytometry, NORMA, CCP\"    Interval history:  Since last visit also saw cardiology;   Dec 2023 SPEP/UPEP ordered which was both negative   Immunoglobulins also normal  Kappa light chain was 26.5, lambda light chain 13.0, ratio 2.04       ROS: Review of Systems   Constitutional:  Positive for fatigue. Negative for appetite change, chills, fever and unexpected weight change.   Respiratory:  Negative for cough and shortness of breath.    Cardiovascular:  Negative for chest pain, palpitations and leg swelling.   Gastrointestinal:  Negative for abdominal pain, constipation, diarrhea, nausea and vomiting.   Genitourinary:  Negative for difficulty urinating, dysuria and hematuria.   Musculoskeletal:  Positive for neck pain. Negative for arthralgias.   Skin: Negative.    Neurological:  Positive for headaches. Negative for dizziness, weakness, light-headedness and " numbness.   Hematological: Negative.    Psychiatric/Behavioral: Negative.         Past Medical History:   Diagnosis Date    Allergic conjunctivitis     last assessed 5/14/15    Chest wall contusion     last assessed 16    Cyst of right breast     last assesed 10/12/15    Fibroadenoma of right breast     last assessed 16    Hypertension     last assessed 16    Hypertension     Iron deficiency     Syphilis        Past Surgical History:   Procedure Laterality Date    BREAST BIOPSY Right     2-3 yrs ago     SECTION  2008    TUBAL LIGATION  2008       Social History     Socioeconomic History    Marital status: Single     Spouse name: None    Number of children: None    Years of education: None    Highest education level: None   Occupational History    None   Tobacco Use    Smoking status: Never     Passive exposure: Never    Smokeless tobacco: Never   Vaping Use    Vaping status: Never Used   Substance and Sexual Activity    Alcohol use: No    Drug use: No    Sexual activity: Not Currently     Partners: Male     Birth control/protection: Pill, Injection   Other Topics Concern    None   Social History Narrative    ** Merged History Encounter **          Social Determinants of Health     Financial Resource Strain: Not on file   Food Insecurity: No Food Insecurity (2023)    Hunger Vital Sign     Worried About Running Out of Food in the Last Year: Never true     Ran Out of Food in the Last Year: Never true   Transportation Needs: No Transportation Needs (2023)    PRAPARE - Transportation     Lack of Transportation (Medical): No     Lack of Transportation (Non-Medical): No   Physical Activity: Not on file   Stress: Not on file   Social Connections: Not on file   Intimate Partner Violence: Not on file   Housing Stability: Low Risk  (2023)    Housing Stability Vital Sign     Unable to Pay for Housing in the Last Year: No     Number of Places Lived in the Last Year: 1     Unstable Housing in  "the Last Year: No       Family History   Problem Relation Age of Onset    Hypertension Father        Allergies   Allergen Reactions    Amlodipine Edema    Atorvastatin Myalgia    Spironolactone Itching    Losartan Palpitations         Current Outpatient Medications:     hydroCHLOROthiazide 25 mg tablet, Take 1 tablet (25 mg total) by mouth daily, Disp: 30 tablet, Rfl: 5    metoprolol succinate (TOPROL-XL) 50 mg 24 hr tablet, take 1 tablet by mouth twice a day, Disp: 60 tablet, Rfl: 5    tiZANidine (ZANAFLEX) 2 mg tablet, Take 1 tablet (2 mg total) by mouth daily at bedtime (Patient taking differently: Take 2 mg by mouth if needed), Disp: 30 tablet, Rfl: 4    Blood Pressure Monitoring (Blood Pressure Monitor Automat) ENOC, Use 2 (two) times a day (Patient not taking: Reported on 2/29/2024), Disp: 1 each, Rfl: 0      Physical Exam:  BP (!) 160/104 (BP Location: Right arm, Patient Position: Sitting, Cuff Size: Adult)   Pulse 95   Temp (!) 96.8 °F (36 °C) (Temporal)   Resp 16   Ht 4' 11\" (1.499 m)   Wt 55.3 kg (122 lb)   SpO2 98%   BMI 24.64 kg/m²     Physical Exam  Vitals reviewed.   Constitutional:       General: She is not in acute distress.     Appearance: She is well-developed. She is not ill-appearing.   HENT:      Head: Normocephalic and atraumatic.   Eyes:      General: No scleral icterus.     Conjunctiva/sclera: Conjunctivae normal.   Cardiovascular:      Rate and Rhythm: Normal rate and regular rhythm.      Heart sounds: Normal heart sounds. No murmur heard.  Pulmonary:      Effort: Pulmonary effort is normal. No respiratory distress.      Breath sounds: Normal breath sounds.   Abdominal:      Palpations: Abdomen is soft.      Tenderness: There is no abdominal tenderness.   Musculoskeletal:         General: No tenderness. Normal range of motion.      Cervical back: Normal range of motion and neck supple.      Right lower leg: No edema.      Left lower leg: No edema.   Lymphadenopathy:      Cervical: No " cervical adenopathy.   Skin:     General: Skin is warm and dry.   Neurological:      Mental Status: She is alert and oriented to person, place, and time.      Cranial Nerves: No cranial nerve deficit.   Psychiatric:         Mood and Affect: Mood normal.         Behavior: Behavior normal.       Labs:  Lab Results   Component Value Date    WBC 2.94 (L) 07/20/2024    HGB 14.3 07/20/2024    HCT 44.1 07/20/2024    MCV 86 07/20/2024     07/20/2024       I have spent 20 minutes with Patient  today in which greater than 50% of this time was spent in counseling/coordination of care regarding Diagnostic results, Risks and benefits of tx options, Instructions for management, Impressions, Documenting in the medical record, Reviewing / ordering tests, medicine, procedures  , and Obtaining or reviewing history  .     Patient voiced understanding and agreement in the above discussion. Aware to contact our office with questions/symptoms in the interim.     This note has been generated by voice recognition software system.  Therefore, there may be spelling, grammar, and or syntax errors. Please contact if questions arise.

## 2024-08-01 ENCOUNTER — TELEPHONE (OUTPATIENT)
Dept: FAMILY MEDICINE CLINIC | Facility: CLINIC | Age: 49
End: 2024-08-01

## 2024-08-01 ENCOUNTER — OFFICE VISIT (OUTPATIENT)
Dept: CARDIOLOGY CLINIC | Facility: CLINIC | Age: 49
End: 2024-08-01
Payer: COMMERCIAL

## 2024-08-01 VITALS
TEMPERATURE: 98.2 F | OXYGEN SATURATION: 98 % | DIASTOLIC BLOOD PRESSURE: 90 MMHG | SYSTOLIC BLOOD PRESSURE: 150 MMHG | HEART RATE: 78 BPM | HEIGHT: 59 IN | WEIGHT: 125.6 LBS | BODY MASS INDEX: 25.32 KG/M2

## 2024-08-01 DIAGNOSIS — I1A.0 RESISTANT HYPERTENSION: ICD-10-CM

## 2024-08-01 DIAGNOSIS — I10 PRIMARY HYPERTENSION: ICD-10-CM

## 2024-08-01 DIAGNOSIS — I11.9 HYPERTENSIVE LEFT VENTRICULAR HYPERTROPHY, WITHOUT HEART FAILURE: ICD-10-CM

## 2024-08-01 PROCEDURE — 3077F SYST BP >= 140 MM HG: CPT

## 2024-08-01 PROCEDURE — 99214 OFFICE O/P EST MOD 30 MIN: CPT

## 2024-08-01 PROCEDURE — 3080F DIAST BP >= 90 MM HG: CPT

## 2024-08-01 NOTE — PROGRESS NOTES
Marilyn Delong  1975  [id number]  [facility] [address] [phone]    1. Hypertensive left ventricular hypertrophy, without heart failure  Immunoglobulin free LT chains blood    Protein electrophoresis, urine    Protein electrophoresis, serum      2. Primary hypertension        3. Resistant hypertension  Ambulatory Referral to Cardiology          Summary/Discussion:  LVH:  - reports history of hypertension for 3-4 years but when looking through her chart there is evidence of high blood pressures back to 2015  - cardiac MRI (1/2024): Without evidence of infiltrative disease.  Mild to moderate LVH  - echo (6/2023): Severe LVH but normal voltages on EKG **   - TIBC (7/2024): normal   - kappa/lambda (12/2023): 2.0 in the absence of known renal disease  - serum immunofixation (12/2023): with no monoclonal immunoglobulins  - UPEP (12/2023): without monoclonal bands  - recommendations at last office visit from primary cardiologist were to repeat Kappa/Lambda, UPEP, SPEP in 6 months and if remains abnormal will discuss referral  order placed today   - most resent lipids reviewed, LDL slightly elevated while not being on statin therapy with known hx of statin intolerance.  ASCVD 10-year risk score 5.9%  encouraged low cholesterol, mediterranean diet, and annual lipid follow up    Hypertension:   - blood pressure not well-controlled, 150/90  reports not taking her hydrochlorothiazide due to side effect of dizziness. She also reports not taking her metoprolol as prescribed over the last week or so    instructed her to restart her metoprolol as prescribed. I would like her come back in 1 week for a nurse visit to recheck her blood pressure. If still elevated further adjustments to her antihypertensive regimen can be made at that time, although we are limited in therapies due to her experiencing side effects to several medications in the past   - low  sodium diet reinforced     Interval History: Marilyn Delong is a 49 y.o. year old female with history of LVH and hyperlipidemia who presents to the office today for routine follow up.     Since her last office visit she has no significant cardiac complaints.She denies any chest pain/pressure/discomfort or shortness of breath. She denies lower extremity edema, orthopnea, and PND. She denies lightheadedness, dizziness, and syncope. She denies palpitations. She does report that she has not been taking her hydrochlorothiazide due to side effect of dizziness and has also not been taking her metoprolol for the last week or so.  She does report occasional headaches which are likely associated to her hypertension. She states trying to watch her sodium intake.       She will RTO in 1 week for a nurse visit to recheck her blood pressure and in 6 months with Dr. Farmer or sooner if necessary. She will call with any concerns.       Medical Problems       Problem List       Hypokalemia    Pure hypercholesterolemia    Primary hypertension    Family history of thalassemia    Neutropenia (HCC)    Hypoferremia    Carpal tunnel syndrome of left wrist    Acute non intractable tension-type headache    Cervicalgia    Seasonal allergies    Pica    Iron deficiency anemia secondary to inadequate dietary iron intake    Cervical radiculopathy    Stroke-like symptoms    History of iron deficiency (Chronic)    Irregular menses    Left arm weakness        Past Medical History:   Diagnosis Date    Allergic conjunctivitis     last assessed 5/14/15    Chest wall contusion     last assessed 11/1/16    Cyst of right breast     last assesed 10/12/15    Fibroadenoma of right breast     last assessed 7/21/16    Hypertension     last assessed 7/12/16    Hypertension     Iron deficiency     Syphilis      Social History     Socioeconomic History    Marital status: Single     Spouse name: Not on file    Number of children: Not on file    Years of education:  Not on file    Highest education level: Not on file   Occupational History    Not on file   Tobacco Use    Smoking status: Never     Passive exposure: Never    Smokeless tobacco: Never   Vaping Use    Vaping status: Never Used   Substance and Sexual Activity    Alcohol use: No    Drug use: No    Sexual activity: Not Currently     Partners: Male     Birth control/protection: Pill, Injection   Other Topics Concern    Not on file   Social History Narrative    ** Merged History Encounter **          Social Determinants of Health     Financial Resource Strain: Not on file   Food Insecurity: No Food Insecurity (2023)    Hunger Vital Sign     Worried About Running Out of Food in the Last Year: Never true     Ran Out of Food in the Last Year: Never true   Transportation Needs: No Transportation Needs (2023)    PRAPARE - Transportation     Lack of Transportation (Medical): No     Lack of Transportation (Non-Medical): No   Physical Activity: Not on file   Stress: Not on file   Social Connections: Not on file   Intimate Partner Violence: Not on file   Housing Stability: Low Risk  (2023)    Housing Stability Vital Sign     Unable to Pay for Housing in the Last Year: No     Number of Places Lived in the Last Year: 1     Unstable Housing in the Last Year: No      Family History   Problem Relation Age of Onset    Hypertension Father      Past Surgical History:   Procedure Laterality Date    BREAST BIOPSY Right     2-3 yrs ago     SECTION  2008    TUBAL LIGATION         Current Outpatient Medications:     metoprolol succinate (TOPROL-XL) 50 mg 24 hr tablet, take 1 tablet by mouth twice a day, Disp: 60 tablet, Rfl: 5    Blood Pressure Monitoring (Blood Pressure Monitor Automat) ENOC, Use 2 (two) times a day (Patient not taking: Reported on 2024), Disp: 1 each, Rfl: 0    hydroCHLOROthiazide 25 mg tablet, Take 1 tablet (25 mg total) by mouth daily (Patient not taking: Reported on 2024), Disp: 30  "tablet, Rfl: 5    tiZANidine (ZANAFLEX) 2 mg tablet, Take 1 tablet (2 mg total) by mouth daily at bedtime (Patient not taking: Reported on 8/1/2024), Disp: 30 tablet, Rfl: 4  Allergies   Allergen Reactions    Amlodipine Edema    Atorvastatin Myalgia    Spironolactone Itching    Losartan Palpitations       Labs:     Chemistry        Component Value Date/Time     12/16/2015 0711    K 3.6 07/20/2024 0714    K 3.8 12/16/2015 0711     07/20/2024 0714     12/16/2015 0711    CO2 29 07/20/2024 0714    CO2 28 12/16/2015 0711    BUN 9 07/20/2024 0714    BUN 10 12/16/2015 0711    CREATININE 0.75 07/20/2024 0714    CREATININE 0.71 12/16/2015 0711        Component Value Date/Time    CALCIUM 9.4 07/20/2024 0714    CALCIUM 8.9 12/16/2015 0711    ALKPHOS 64 06/07/2024 0706    ALKPHOS 62 12/16/2015 0711    AST 19 06/07/2024 0706    AST 14 12/16/2015 0711    ALT 17 06/07/2024 0706    ALT 32 12/16/2015 0711    BILITOT 0.59 12/16/2015 0711            Lab Results   Component Value Date    CHOL 143 08/04/2015    CHOL 243 06/30/2015    CHOL 201 02/21/2014     Lab Results   Component Value Date    HDL 63 06/18/2023    HDL 94 03/03/2023    HDL 71 10/20/2021     Lab Results   Component Value Date    LDLCALC 119 (H) 06/18/2023    LDLCALC 128 (H) 03/03/2023    LDLCALC 110 (H) 10/20/2021     Lab Results   Component Value Date    TRIG 40 06/18/2023    TRIG 48 03/03/2023    TRIG 42 10/20/2021     No results found for: \"CHOLHDL\"    Imaging: No results found.    ECG:  n/a    Review of Systems   Constitutional: Negative.   HENT: Negative.     Eyes:  Negative for blurred vision.   Cardiovascular:  Negative for chest pain, dyspnea on exertion, irregular heartbeat, leg swelling, near-syncope, orthopnea, palpitations, paroxysmal nocturnal dyspnea and syncope.   Respiratory:  Negative for shortness of breath and sleep disturbances due to breathing.    Endocrine: Negative.    Hematologic/Lymphatic: Negative.    Skin: Negative.  " "  Musculoskeletal: Negative.    Gastrointestinal: Negative.    Genitourinary: Negative.    Neurological:  Positive for dizziness (while on HCTZ-- improved once stopped) and headaches. Negative for light-headedness.   Psychiatric/Behavioral: Negative.     Allergic/Immunologic: Negative.        Vitals:    08/01/24 0906   BP: 150/90   Pulse: 78   Temp: 98.2 °F (36.8 °C)   SpO2: 98%     Vitals:    08/01/24 0906   Weight: 57 kg (125 lb 9.6 oz)     Height: 4' 11\" (149.9 cm)   Body mass index is 25.37 kg/m².    Physical Exam  Vitals and nursing note reviewed.   Constitutional:       General: She is not in acute distress.     Appearance: She is not ill-appearing.   HENT:      Head: Normocephalic.      Nose: Nose normal.      Mouth/Throat:      Mouth: Mucous membranes are moist.      Pharynx: Oropharynx is clear.   Cardiovascular:      Rate and Rhythm: Normal rate and regular rhythm.      Heart sounds: No murmur heard.  Pulmonary:      Effort: Pulmonary effort is normal.      Breath sounds: Normal breath sounds.   Musculoskeletal:         General: Normal range of motion.      Cervical back: Normal range of motion.      Right lower leg: No edema.      Left lower leg: No edema.   Skin:     General: Skin is dry.   Neurological:      Mental Status: She is alert and oriented to person, place, and time.   Psychiatric:         Mood and Affect: Mood normal.         Behavior: Behavior normal.        "

## 2024-08-01 NOTE — TELEPHONE ENCOUNTER
Patient called the RX Refill Line. Message is being forwarded to the office.     Patient is requesting Vitamin C 1,000 mg. It is not active in patients chart and she stated that Dr Winchester advised she start taking it again    Please contact patient at [phone]

## 2024-08-02 DIAGNOSIS — E61.1 IRON DEFICIENCY: ICD-10-CM

## 2024-08-02 RX ORDER — MULTIVIT WITH MINERALS/LUTEIN
1000 TABLET ORAL DAILY
Qty: 30 TABLET | Refills: 5 | Status: SHIPPED | OUTPATIENT
Start: 2024-08-02 | End: 2024-09-01

## 2024-08-16 ENCOUNTER — CLINICAL SUPPORT (OUTPATIENT)
Dept: CARDIOLOGY CLINIC | Facility: CLINIC | Age: 49
End: 2024-08-16

## 2024-08-16 VITALS
DIASTOLIC BLOOD PRESSURE: 94 MMHG | TEMPERATURE: 98.2 F | BODY MASS INDEX: 25.44 KG/M2 | HEART RATE: 62 BPM | SYSTOLIC BLOOD PRESSURE: 148 MMHG | WEIGHT: 126.2 LBS | OXYGEN SATURATION: 98 % | HEIGHT: 59 IN

## 2024-08-16 DIAGNOSIS — I10 PRIMARY HYPERTENSION: Primary | ICD-10-CM

## 2024-08-16 DIAGNOSIS — I11.9 HYPERTENSIVE LEFT VENTRICULAR HYPERTROPHY, WITHOUT HEART FAILURE: Primary | ICD-10-CM

## 2024-08-16 PROCEDURE — NC001 PR NO CHARGE

## 2024-08-16 RX ORDER — CARVEDILOL 25 MG/1
25 TABLET ORAL 2 TIMES DAILY WITH MEALS
Qty: 180 TABLET | Refills: 1 | Status: SHIPPED | OUTPATIENT
Start: 2024-08-16

## 2024-08-16 NOTE — PROGRESS NOTES
Pt came in today for a blood pressure check. Pt's blood pressure was 148/94 with a pulse reading of 62 off of pulse oximeter. Pt reports taking metoprolol 50mg BID. Per Dr. Wells, patient will stop taking metoprolol 50 mg BID and start on coreg 25mg BID. Pt understood and was dismissed.

## 2024-08-27 ENCOUNTER — TELEPHONE (OUTPATIENT)
Age: 49
End: 2024-08-27

## 2024-08-27 NOTE — TELEPHONE ENCOUNTER
Just an FYI  Pt calling stating she is experiencing fatigue since starting Carvedilol and stopping Metoprolol, but does not have any way of checking vitals.   I asked her to go to a pharmacy of grocery store and to call back it vitals are low.   She stated she will get the blood work completed soon.

## 2024-09-03 ENCOUNTER — APPOINTMENT (OUTPATIENT)
Dept: LAB | Age: 49
End: 2024-09-03
Payer: COMMERCIAL

## 2024-09-03 DIAGNOSIS — I11.9 HYPERTENSIVE LEFT VENTRICULAR HYPERTROPHY, WITHOUT HEART FAILURE: ICD-10-CM

## 2024-09-04 LAB
ALBUMIN SERPL ELPH-MCNC: 3.77 G/DL (ref 3.2–5.1)
ALBUMIN SERPL ELPH-MCNC: 58.9 % (ref 48–70)
ALBUMIN UR ELPH-MCNC: 100 %
ALPHA1 GLOB MFR UR ELPH: 0 %
ALPHA1 GLOB SERPL ELPH-MCNC: 0.23 G/DL (ref 0.15–0.47)
ALPHA1 GLOB SERPL ELPH-MCNC: 3.6 % (ref 1.8–7)
ALPHA2 GLOB MFR UR ELPH: 0 %
ALPHA2 GLOB SERPL ELPH-MCNC: 0.52 G/DL (ref 0.42–1.04)
ALPHA2 GLOB SERPL ELPH-MCNC: 8.1 % (ref 5.9–14.9)
B-GLOBULIN MFR UR ELPH: 0 %
BETA GLOB ABNORMAL SERPL ELPH-MCNC: 0.35 G/DL (ref 0.31–0.57)
BETA1 GLOB SERPL ELPH-MCNC: 5.4 % (ref 4.7–7.7)
BETA2 GLOB SERPL ELPH-MCNC: 3.9 % (ref 3.1–7.9)
BETA2+GAMMA GLOB SERPL ELPH-MCNC: 0.25 G/DL (ref 0.2–0.58)
GAMMA GLOB ABNORMAL SERPL ELPH-MCNC: 1.29 G/DL (ref 0.4–1.66)
GAMMA GLOB MFR UR ELPH: 0 %
GAMMA GLOB SERPL ELPH-MCNC: 20.1 % (ref 6.9–22.3)
IGG/ALB SER: 1.43 {RATIO} (ref 1.1–1.8)
KAPPA LC FREE SER-MCNC: 19.1 MG/L (ref 3.3–19.4)
KAPPA LC FREE/LAMBDA FREE SER: 1.74 {RATIO} (ref 0.26–1.65)
LAMBDA LC FREE SERPL-MCNC: 11 MG/L (ref 5.7–26.3)
PROT PATTERN SERPL ELPH-IMP: NORMAL
PROT PATTERN UR ELPH-IMP: NORMAL
PROT SERPL-MCNC: 6.4 G/DL (ref 6.4–8.2)
PROT UR-MCNC: 9.7 MG/DL

## 2024-09-04 PROCEDURE — 84165 PROTEIN E-PHORESIS SERUM: CPT | Performed by: PATHOLOGY

## 2024-09-04 PROCEDURE — 84166 PROTEIN E-PHORESIS/URINE/CSF: CPT | Performed by: PATHOLOGY

## 2024-09-10 ENCOUNTER — NURSE TRIAGE (OUTPATIENT)
Age: 49
End: 2024-09-10

## 2024-09-10 NOTE — TELEPHONE ENCOUNTER
"Reason for Disposition   Nursing judgment    Answer Assessment - Initial Assessment Questions  1. REASON FOR CALL or QUESTION: \"What is your reason for calling today?\" or \"How can I best help you?\" or \"What question do you have that I can help answer?\"      Marilyn called and looking for the urine, and blood work results    Continue to have the fatigued feeling, along with the lower back pain.   Asking could it be from the medication?    Please advise    Protocols used: Information Only Call - No Triage-ADULT-[place]    "

## 2024-10-11 ENCOUNTER — OFFICE VISIT (OUTPATIENT)
Dept: CARDIOLOGY CLINIC | Facility: CLINIC | Age: 49
End: 2024-10-11
Payer: COMMERCIAL

## 2024-10-11 VITALS
WEIGHT: 121.6 LBS | HEART RATE: 81 BPM | HEIGHT: 59 IN | DIASTOLIC BLOOD PRESSURE: 88 MMHG | OXYGEN SATURATION: 99 % | BODY MASS INDEX: 24.52 KG/M2 | SYSTOLIC BLOOD PRESSURE: 162 MMHG

## 2024-10-11 DIAGNOSIS — E78.00 PURE HYPERCHOLESTEROLEMIA: ICD-10-CM

## 2024-10-11 DIAGNOSIS — I10 PRIMARY HYPERTENSION: ICD-10-CM

## 2024-10-11 DIAGNOSIS — I11.9 HYPERTENSIVE LEFT VENTRICULAR HYPERTROPHY, WITHOUT HEART FAILURE: ICD-10-CM

## 2024-10-11 PROCEDURE — 99214 OFFICE O/P EST MOD 30 MIN: CPT

## 2024-10-11 NOTE — PROGRESS NOTES
Marilyn Delong  1975  [id number]  [facility] [address] [phone]    1. Primary hypertension        2. Hypertensive left ventricular hypertrophy, without heart failure        3. Pure hypercholesterolemia            Summary/Discussion:  LVH:  - reports history of hypertension for 3-4 years but when looking through her chart there is evidence of high blood pressures back to 2015  - cardiac MRI (1/2024): without evidence of infiltrative disease.  Mild to moderate LVH  - echo (6/2023): severe LVH   - TIBC (7/2024): normal   - kappa/lambda (9/2024): 1.74 in the absence of known renal disease  seen by hematology who felt a mild elevation in her kappa/lambda ratio were not felt to be clinically concerning  - serum immunofixation (12/2023): with no monoclonal immunoglobulins  - UPEP (9/2024): without monoclonal bands  - most resent lipids reviewed, LDL slightly elevated while not being on statin therapy with known hx of statin intolerance.  ASCVD 10-year risk score 4.3%  encouraged low cholesterol, mediterranean diet, and annual lipid follow up    Hypertension:   - blood pressure continue to not be well-controlled, 162/88  reports that she took her carvedilol right before our visit today. She states that she has only been taking her carvedilol daily instead of twice daily due to potential side effects of back pain? but was also on her menstrual cycle at that time as well  reported side effects of dizziness from hydrochlorothiazide   she does not check her blood pressure regularly at home   advised her to start taking her carvedilol as prescribed and to let me know if she develops side effects  1 week nurse visit to recheck blood pressure   - low sodium diet reinforced     Interval History: Marilyn Delong is a 49 y.o. year old female with history of LVH and hyperlipidemia who presents to the office today for routine follow up.     Since her last office  visit she has no significant cardiac complaints.She denies any chest pain/pressure/discomfort or shortness of breath. She denies lower extremity edema, orthopnea, and PND. She denies lightheadedness, dizziness, and syncope. She denies palpitations. She reports that she has not been taking her carvedilol twice daily as prescribed due to back pain but was also on her menstrual cycle at that time as well. She does not check her blood pressures regularly at home. She states trying to watch her sodium intake.       She will RTO in 1 week for a nurse visit to recheck her blood pressure and on 2/3/2025 with Dr. Farmer or sooner if necessary. She will call with any concerns.       Medical Problems       Problem List       Hypokalemia    Pure hypercholesterolemia    Primary hypertension    Family history of thalassemia    Neutropenia (HCC)    Hypoferremia    Carpal tunnel syndrome of left wrist    Acute non intractable tension-type headache    Cervicalgia    Seasonal allergies    Pica    Iron deficiency anemia secondary to inadequate dietary iron intake    Cervical radiculopathy    Stroke-like symptoms    History of iron deficiency (Chronic)    Irregular menses    Left arm weakness        Past Medical History:   Diagnosis Date    Allergic conjunctivitis     last assessed 5/14/15    Chest wall contusion     last assessed 11/1/16    Cyst of right breast     last assesed 10/12/15    Fibroadenoma of right breast     last assessed 7/21/16    Hypertension     last assessed 7/12/16    Hypertension     Iron deficiency     Syphilis      Social History     Socioeconomic History    Marital status: Single     Spouse name: Not on file    Number of children: Not on file    Years of education: Not on file    Highest education level: Not on file   Occupational History    Not on file   Tobacco Use    Smoking status: Never     Passive exposure: Never    Smokeless tobacco: Never   Vaping Use    Vaping status: Never Used   Substance and Sexual  Activity    Alcohol use: No    Drug use: No    Sexual activity: Not Currently     Partners: Male     Birth control/protection: Pill, Injection   Other Topics Concern    Not on file   Social History Narrative    ** Merged History Encounter **          Social Determinants of Health     Financial Resource Strain: Not on file   Food Insecurity: No Food Insecurity (2023)    Hunger Vital Sign     Worried About Running Out of Food in the Last Year: Never true     Ran Out of Food in the Last Year: Never true   Transportation Needs: No Transportation Needs (2023)    PRAPARE - Transportation     Lack of Transportation (Medical): No     Lack of Transportation (Non-Medical): No   Physical Activity: Not on file   Stress: Not on file   Social Connections: Not on file   Intimate Partner Violence: Not on file   Housing Stability: Low Risk  (2023)    Housing Stability Vital Sign     Unable to Pay for Housing in the Last Year: No     Number of Places Lived in the Last Year: 1     Unstable Housing in the Last Year: No      Family History   Problem Relation Age of Onset    Hypertension Father      Past Surgical History:   Procedure Laterality Date    BREAST BIOPSY Right     2-3 yrs ago     SECTION      TUBAL LIGATION         Current Outpatient Medications:     Ascorbic Acid (vitamin C) 1000 MG tablet, Take 1 tablet (1,000 mg total) by mouth daily, Disp: 30 tablet, Rfl: 5    carvedilol (COREG) 25 mg tablet, Take 1 tablet (25 mg total) by mouth 2 (two) times a day with meals (Patient taking differently: Take 25 mg by mouth in the morning Pt taking 25 mg daily), Disp: 180 tablet, Rfl: 1    Blood Pressure Monitoring (Blood Pressure Monitor Automat) ENOC, Use 2 (two) times a day (Patient not taking: Reported on 2024), Disp: 1 each, Rfl: 0    tiZANidine (ZANAFLEX) 2 mg tablet, Take 1 tablet (2 mg total) by mouth daily at bedtime (Patient not taking: Reported on 2024), Disp: 30 tablet, Rfl: 4  Allergies  "  Allergen Reactions    Amlodipine Edema    Atorvastatin Myalgia    Spironolactone Itching    Losartan Palpitations       Labs:     Chemistry        Component Value Date/Time     12/16/2015 0711    K 3.6 07/20/2024 0714    K 3.8 12/16/2015 0711     07/20/2024 0714     12/16/2015 0711    CO2 29 07/20/2024 0714    CO2 28 12/16/2015 0711    BUN 9 07/20/2024 0714    BUN 10 12/16/2015 0711    CREATININE 0.75 07/20/2024 0714    CREATININE 0.71 12/16/2015 0711        Component Value Date/Time    CALCIUM 9.4 07/20/2024 0714    CALCIUM 8.9 12/16/2015 0711    ALKPHOS 64 06/07/2024 0706    ALKPHOS 62 12/16/2015 0711    AST 19 06/07/2024 0706    AST 14 12/16/2015 0711    ALT 17 06/07/2024 0706    ALT 32 12/16/2015 0711    BILITOT 0.59 12/16/2015 0711            Lab Results   Component Value Date    CHOL 143 08/04/2015    CHOL 243 06/30/2015    CHOL 201 02/21/2014     Lab Results   Component Value Date    HDL 63 06/18/2023    HDL 94 03/03/2023    HDL 71 10/20/2021     Lab Results   Component Value Date    LDLCALC 119 (H) 06/18/2023    LDLCALC 128 (H) 03/03/2023    LDLCALC 110 (H) 10/20/2021     Lab Results   Component Value Date    TRIG 40 06/18/2023    TRIG 48 03/03/2023    TRIG 42 10/20/2021     No results found for: \"CHOLHDL\"    Imaging: No results found.    ECG:  n/a    Review of Systems   Constitutional: Negative.   HENT: Negative.     Eyes:  Negative for blurred vision.   Cardiovascular:  Negative for chest pain, dyspnea on exertion, irregular heartbeat, leg swelling, near-syncope, orthopnea, palpitations, paroxysmal nocturnal dyspnea and syncope.   Respiratory:  Negative for shortness of breath and sleep disturbances due to breathing.    Endocrine: Negative.    Hematologic/Lymphatic: Negative.    Skin: Negative.    Musculoskeletal:  Positive for back pain (resolved).   Gastrointestinal: Negative.    Genitourinary: Negative.    Neurological:  Negative for dizziness, headaches and light-headedness. " "  Psychiatric/Behavioral: Negative.     Allergic/Immunologic: Negative.        Vitals:    10/11/24 0811   BP: 162/88   Pulse: 81   SpO2: 99%       Vitals:    10/11/24 0811   Weight: 55.2 kg (121 lb 9.6 oz)       Height: 4' 11\" (149.9 cm)   Body mass index is 24.56 kg/m².    Physical Exam  Vitals and nursing note reviewed.   Constitutional:       General: She is not in acute distress.     Appearance: She is not ill-appearing.   HENT:      Head: Normocephalic.      Nose: Nose normal.      Mouth/Throat:      Mouth: Mucous membranes are moist.      Pharynx: Oropharynx is clear.   Cardiovascular:      Rate and Rhythm: Normal rate and regular rhythm.      Heart sounds: No murmur heard.  Pulmonary:      Effort: Pulmonary effort is normal.      Breath sounds: Normal breath sounds.   Musculoskeletal:         General: Normal range of motion.      Cervical back: Normal range of motion.      Right lower leg: No edema.      Left lower leg: No edema.   Skin:     General: Skin is dry.   Neurological:      Mental Status: She is alert and oriented to person, place, and time.   Psychiatric:         Mood and Affect: Mood normal.         Behavior: Behavior normal.        "

## 2024-10-25 ENCOUNTER — CLINICAL SUPPORT (OUTPATIENT)
Dept: CARDIOLOGY CLINIC | Facility: CLINIC | Age: 49
End: 2024-10-25
Payer: COMMERCIAL

## 2024-10-25 VITALS
OXYGEN SATURATION: 99 % | BODY MASS INDEX: 24.84 KG/M2 | HEART RATE: 74 BPM | WEIGHT: 123 LBS | SYSTOLIC BLOOD PRESSURE: 140 MMHG | DIASTOLIC BLOOD PRESSURE: 92 MMHG

## 2024-10-25 DIAGNOSIS — I10 HYPERTENSION, UNSPECIFIED TYPE: Primary | ICD-10-CM

## 2024-10-25 PROCEDURE — 99211 OFF/OP EST MAY X REQ PHY/QHP: CPT

## 2024-10-25 PROCEDURE — RECHECK

## 2024-10-25 RX ORDER — MULTIVIT WITH MINERALS/LUTEIN
1000 TABLET ORAL DAILY
COMMUNITY

## 2024-10-25 NOTE — PROGRESS NOTES
PT presents for a BP check per Yaquelin Gordon.     PT has been taken her medication Carvedilol 25mg two times a day

## 2025-07-22 ENCOUNTER — VBI (OUTPATIENT)
Dept: ADMINISTRATIVE | Facility: OTHER | Age: 50
End: 2025-07-22

## 2025-07-22 NOTE — TELEPHONE ENCOUNTER
07/22/25 1:15 PM     Chart reviewed for Pap Smear (HPV) aka Cervical Cancer Screening was/were not submitted to the patient's insurance.     Rayna Gomez MA   [facility]